# Patient Record
Sex: MALE | Race: WHITE | Employment: OTHER | URBAN - METROPOLITAN AREA
[De-identification: names, ages, dates, MRNs, and addresses within clinical notes are randomized per-mention and may not be internally consistent; named-entity substitution may affect disease eponyms.]

---

## 2022-11-12 ENCOUNTER — HOSPITAL ENCOUNTER (INPATIENT)
Age: 72
LOS: 5 days | Discharge: HOME | DRG: 247 | End: 2022-11-18
Attending: EMERGENCY MEDICINE | Admitting: INTERNAL MEDICINE
Payer: MEDICARE

## 2022-11-12 ENCOUNTER — APPOINTMENT (OUTPATIENT)
Dept: GENERAL RADIOLOGY | Age: 72
DRG: 247 | End: 2022-11-12
Payer: MEDICARE

## 2022-11-12 DIAGNOSIS — R04.0 EPISTAXIS: Primary | ICD-10-CM

## 2022-11-12 DIAGNOSIS — I21.3 ST ELEVATION MYOCARDIAL INFARCTION (STEMI), UNSPECIFIED ARTERY (HCC): ICD-10-CM

## 2022-11-12 DIAGNOSIS — I21.3 STEMI (ST ELEVATION MYOCARDIAL INFARCTION) (HCC): ICD-10-CM

## 2022-11-12 LAB
ALBUMIN SERPL-MCNC: 3.1 G/DL (ref 3.2–4.6)
ALBUMIN/GLOB SERPL: 0.9 {RATIO} (ref 0.4–1.6)
ALP SERPL-CCNC: 90 U/L (ref 50–136)
ALT SERPL-CCNC: 23 U/L (ref 12–65)
ANION GAP SERPL CALC-SCNC: 5 MMOL/L (ref 2–11)
AST SERPL-CCNC: 12 U/L (ref 15–37)
BASOPHILS # BLD: 0.1 K/UL (ref 0–0.2)
BASOPHILS NFR BLD: 1 % (ref 0–2)
BILIRUB SERPL-MCNC: 0.3 MG/DL (ref 0.2–1.1)
BUN SERPL-MCNC: 17 MG/DL (ref 8–23)
CALCIUM SERPL-MCNC: 9.2 MG/DL (ref 8.3–10.4)
CHLORIDE SERPL-SCNC: 111 MMOL/L (ref 101–110)
CO2 SERPL-SCNC: 25 MMOL/L (ref 21–32)
CREAT SERPL-MCNC: 1.3 MG/DL (ref 0.8–1.5)
DIFFERENTIAL METHOD BLD: ABNORMAL
EOSINOPHIL # BLD: 0.3 K/UL (ref 0–0.8)
EOSINOPHIL NFR BLD: 2 % (ref 0.5–7.8)
ERYTHROCYTE [DISTWIDTH] IN BLOOD BY AUTOMATED COUNT: 13.1 % (ref 11.9–14.6)
GLOBULIN SER CALC-MCNC: 3.6 G/DL (ref 2.8–4.5)
GLUCOSE SERPL-MCNC: 124 MG/DL (ref 65–100)
HCT VFR BLD AUTO: 44.3 % (ref 41.1–50.3)
HGB BLD-MCNC: 14.9 G/DL (ref 13.6–17.2)
IMM GRANULOCYTES # BLD AUTO: 0.1 K/UL (ref 0–0.5)
IMM GRANULOCYTES NFR BLD AUTO: 1 % (ref 0–5)
LYMPHOCYTES # BLD: 2.7 K/UL (ref 0.5–4.6)
LYMPHOCYTES NFR BLD: 19 % (ref 13–44)
MCH RBC QN AUTO: 31.7 PG (ref 26.1–32.9)
MCHC RBC AUTO-ENTMCNC: 33.6 G/DL (ref 31.4–35)
MCV RBC AUTO: 94.3 FL (ref 82–102)
MONOCYTES # BLD: 1.2 K/UL (ref 0.1–1.3)
MONOCYTES NFR BLD: 8 % (ref 4–12)
NEUTS SEG # BLD: 9.8 K/UL (ref 1.7–8.2)
NEUTS SEG NFR BLD: 69 % (ref 43–78)
NRBC # BLD: 0 K/UL (ref 0–0.2)
PLATELET # BLD AUTO: 395 K/UL (ref 150–450)
PMV BLD AUTO: 10 FL (ref 9.4–12.3)
POTASSIUM SERPL-SCNC: 3.8 MMOL/L (ref 3.5–5.1)
PROT SERPL-MCNC: 6.7 G/DL (ref 6.3–8.2)
RBC # BLD AUTO: 4.7 M/UL (ref 4.23–5.6)
SODIUM SERPL-SCNC: 141 MMOL/L (ref 133–143)
WBC # BLD AUTO: 14.1 K/UL (ref 4.3–11.1)

## 2022-11-12 PROCEDURE — 2580000003 HC RX 258: Performed by: EMERGENCY MEDICINE

## 2022-11-12 PROCEDURE — 6370000000 HC RX 637 (ALT 250 FOR IP): Performed by: PHYSICIAN ASSISTANT

## 2022-11-12 PROCEDURE — 027034Z DILATION OF CORONARY ARTERY, ONE ARTERY WITH DRUG-ELUTING INTRALUMINAL DEVICE, PERCUTANEOUS APPROACH: ICD-10-PCS | Performed by: INTERNAL MEDICINE

## 2022-11-12 PROCEDURE — 30903 CONTROL OF NOSEBLEED: CPT

## 2022-11-12 PROCEDURE — 99285 EMERGENCY DEPT VISIT HI MDM: CPT

## 2022-11-12 PROCEDURE — 85025 COMPLETE CBC W/AUTO DIFF WBC: CPT

## 2022-11-12 PROCEDURE — 4A023N7 MEASUREMENT OF CARDIAC SAMPLING AND PRESSURE, LEFT HEART, PERCUTANEOUS APPROACH: ICD-10-PCS | Performed by: INTERNAL MEDICINE

## 2022-11-12 PROCEDURE — 71045 X-RAY EXAM CHEST 1 VIEW: CPT

## 2022-11-12 PROCEDURE — 93005 ELECTROCARDIOGRAM TRACING: CPT | Performed by: EMERGENCY MEDICINE

## 2022-11-12 PROCEDURE — 85610 PROTHROMBIN TIME: CPT

## 2022-11-12 PROCEDURE — 96374 THER/PROPH/DIAG INJ IV PUSH: CPT

## 2022-11-12 PROCEDURE — 6360000002 HC RX W HCPCS

## 2022-11-12 PROCEDURE — 96375 TX/PRO/DX INJ NEW DRUG ADDON: CPT

## 2022-11-12 PROCEDURE — 6370000000 HC RX 637 (ALT 250 FOR IP): Performed by: EMERGENCY MEDICINE

## 2022-11-12 PROCEDURE — 85730 THROMBOPLASTIN TIME PARTIAL: CPT

## 2022-11-12 PROCEDURE — B2111ZZ FLUOROSCOPY OF MULTIPLE CORONARY ARTERIES USING LOW OSMOLAR CONTRAST: ICD-10-PCS | Performed by: INTERNAL MEDICINE

## 2022-11-12 PROCEDURE — 84484 ASSAY OF TROPONIN QUANT: CPT

## 2022-11-12 PROCEDURE — 80053 COMPREHEN METABOLIC PANEL: CPT

## 2022-11-12 PROCEDURE — 6360000002 HC RX W HCPCS: Performed by: EMERGENCY MEDICINE

## 2022-11-12 DEVICE — STENT RONYX30034UX RESOLUTE ONYX 3.00X34
Type: IMPLANTABLE DEVICE | Status: FUNCTIONAL
Brand: RESOLUTE ONYX™

## 2022-11-12 RX ORDER — HEPARIN SODIUM 1000 [USP'U]/ML
4000 INJECTION, SOLUTION INTRAVENOUS; SUBCUTANEOUS ONCE
Status: COMPLETED | OUTPATIENT
Start: 2022-11-12 | End: 2022-11-12

## 2022-11-12 RX ORDER — ASPIRIN 81 MG/1
324 TABLET, CHEWABLE ORAL
Status: COMPLETED | OUTPATIENT
Start: 2022-11-12 | End: 2022-11-12

## 2022-11-12 RX ORDER — 0.9 % SODIUM CHLORIDE 0.9 %
1000 INTRAVENOUS SOLUTION INTRAVENOUS
Status: COMPLETED | OUTPATIENT
Start: 2022-11-12 | End: 2022-11-13

## 2022-11-12 RX ORDER — HEPARIN SODIUM 1000 [USP'U]/ML
4000 INJECTION, SOLUTION INTRAVENOUS; SUBCUTANEOUS PRN
Status: DISCONTINUED | OUTPATIENT
Start: 2022-11-12 | End: 2022-11-13

## 2022-11-12 RX ORDER — OXYMETAZOLINE HYDROCHLORIDE 0.05 G/100ML
2 SPRAY NASAL ONCE
Status: COMPLETED | OUTPATIENT
Start: 2022-11-12 | End: 2022-11-12

## 2022-11-12 RX ORDER — ONDANSETRON 2 MG/ML
INJECTION INTRAMUSCULAR; INTRAVENOUS
Status: COMPLETED
Start: 2022-11-12 | End: 2022-11-12

## 2022-11-12 RX ORDER — ONDANSETRON 2 MG/ML
4 INJECTION INTRAMUSCULAR; INTRAVENOUS
Status: COMPLETED | OUTPATIENT
Start: 2022-11-13 | End: 2022-11-12

## 2022-11-12 RX ORDER — HEPARIN SODIUM 10000 [USP'U]/100ML
5-30 INJECTION, SOLUTION INTRAVENOUS CONTINUOUS
Status: DISCONTINUED | OUTPATIENT
Start: 2022-11-12 | End: 2022-11-13

## 2022-11-12 RX ORDER — HEPARIN SODIUM 1000 [USP'U]/ML
2000 INJECTION, SOLUTION INTRAVENOUS; SUBCUTANEOUS PRN
Status: DISCONTINUED | OUTPATIENT
Start: 2022-11-12 | End: 2022-11-13

## 2022-11-12 RX ORDER — NITROGLYCERIN 0.4 MG/1
0.4 TABLET SUBLINGUAL EVERY 5 MIN PRN
Status: DISCONTINUED | OUTPATIENT
Start: 2022-11-12 | End: 2022-11-13

## 2022-11-12 RX ADMIN — OXYMETAZOLINE HCL 2 SPRAY: 0.05 SPRAY NASAL at 21:38

## 2022-11-12 RX ADMIN — SODIUM CHLORIDE 1000 ML: 900 INJECTION, SOLUTION INTRAVENOUS at 23:23

## 2022-11-12 RX ADMIN — ONDANSETRON 4 MG: 2 INJECTION INTRAMUSCULAR; INTRAVENOUS at 23:47

## 2022-11-12 RX ADMIN — NITROGLYCERIN 1 INCH: 20 OINTMENT TOPICAL at 23:35

## 2022-11-12 RX ADMIN — ASPIRIN 324 MG: 81 TABLET, CHEWABLE ORAL at 23:31

## 2022-11-12 RX ADMIN — NITROGLYCERIN 0.4 MG: 0.4 TABLET SUBLINGUAL at 23:31

## 2022-11-12 RX ADMIN — HEPARIN SODIUM 4000 UNITS: 1000 INJECTION INTRAVENOUS; SUBCUTANEOUS at 23:42

## 2022-11-12 ASSESSMENT — PAIN DESCRIPTION - LOCATION: LOCATION: CHEST

## 2022-11-12 ASSESSMENT — ENCOUNTER SYMPTOMS
COUGH: 0
BACK PAIN: 0
NAUSEA: 0
SHORTNESS OF BREATH: 1
COLOR CHANGE: 0
RHINORRHEA: 0
CONSTIPATION: 0
ABDOMINAL PAIN: 0
SORE THROAT: 0
VOMITING: 0
DIARRHEA: 0

## 2022-11-12 ASSESSMENT — PAIN SCALES - GENERAL
PAINLEVEL_OUTOF10: 7
PAINLEVEL_OUTOF10: 6

## 2022-11-12 ASSESSMENT — PAIN - FUNCTIONAL ASSESSMENT: PAIN_FUNCTIONAL_ASSESSMENT: NONE - DENIES PAIN

## 2022-11-12 NOTE — Clinical Note
TRANSFER - OUT REPORT:     Verbal report given to: ICU RN. Report consisted of patient's Situation, Background, Assessment and   Recommendations(SBAR). Opportunity for questions and clarification was provided. Patient transported with a Registered Nurse. Patient transported to: ICU, 3101.

## 2022-11-13 ENCOUNTER — APPOINTMENT (OUTPATIENT)
Dept: NON INVASIVE DIAGNOSTICS | Age: 72
DRG: 247 | End: 2022-11-13
Payer: MEDICARE

## 2022-11-13 ENCOUNTER — APPOINTMENT (OUTPATIENT)
Dept: GENERAL RADIOLOGY | Age: 72
DRG: 247 | End: 2022-11-13
Payer: MEDICARE

## 2022-11-13 PROBLEM — I24.9 ACS (ACUTE CORONARY SYNDROME) (HCC): Status: ACTIVE | Noted: 2022-11-13

## 2022-11-13 PROBLEM — F12.10 MARIJUANA ABUSE: Chronic | Status: ACTIVE | Noted: 2022-11-13

## 2022-11-13 PROBLEM — Z72.0 TOBACCO ABUSE: Chronic | Status: ACTIVE | Noted: 2022-11-13

## 2022-11-13 PROBLEM — I21.02 ST ELEVATION MYOCARDIAL INFARCTION INVOLVING LEFT ANTERIOR DESCENDING (LAD) CORONARY ARTERY (HCC): Chronic | Status: ACTIVE | Noted: 2022-11-13

## 2022-11-13 PROBLEM — R04.0 EPISTAXIS: Status: ACTIVE | Noted: 2022-11-13

## 2022-11-13 LAB
ACT BLD: 404 SECS (ref 70–128)
ANION GAP SERPL CALC-SCNC: 3 MMOL/L (ref 2–11)
APTT PPP: 37 SEC (ref 24.5–34.2)
BUN SERPL-MCNC: 23 MG/DL (ref 8–23)
CALCIUM SERPL-MCNC: 8.5 MG/DL (ref 8.3–10.4)
CHLORIDE SERPL-SCNC: 111 MMOL/L (ref 101–110)
CHOLEST SERPL-MCNC: 146 MG/DL
CO2 SERPL-SCNC: 24 MMOL/L (ref 21–32)
CREAT SERPL-MCNC: 1.1 MG/DL (ref 0.8–1.5)
ECHO AO ASC DIAM: 3 CM
ECHO AO ASCENDING AORTA INDEX: 1.55 CM/M2
ECHO AO ROOT DIAM: 2.7 CM
ECHO AO ROOT INDEX: 1.4 CM/M2
ECHO AV MEAN GRADIENT: 5 MMHG
ECHO AV MEAN VELOCITY: 1 M/S
ECHO AV PEAK GRADIENT: 9 MMHG
ECHO AV PEAK VELOCITY: 1.5 M/S
ECHO AV VTI: 25.5 CM
ECHO BSA: 1.98 M2
ECHO BSA: 1.98 M2
ECHO EST RA PRESSURE: 3 MMHG
ECHO IVC PROX: 1.8 CM
ECHO LA AREA 2C: 19.1 CM2
ECHO LA AREA 4C: 15.2 CM2
ECHO LA DIAMETER INDEX: 1.87 CM/M2
ECHO LA DIAMETER: 3.6 CM
ECHO LA MAJOR AXIS: 5 CM
ECHO LA MINOR AXIS: 5.3 CM
ECHO LA TO AORTIC ROOT RATIO: 1.33
ECHO LA VOL 2C: 56 ML (ref 18–58)
ECHO LA VOL 4C: 35 ML (ref 18–58)
ECHO LA VOL BP: 45 ML (ref 18–58)
ECHO LA VOL/BSA BIPLANE: 23 ML/M2 (ref 16–34)
ECHO LA VOLUME INDEX A2C: 29 ML/M2 (ref 16–34)
ECHO LA VOLUME INDEX A4C: 18 ML/M2 (ref 16–34)
ECHO LV E' LATERAL VELOCITY: 7 CM/S
ECHO LV E' SEPTAL VELOCITY: 6 CM/S
ECHO LV EDV A2C: 138 ML
ECHO LV EDV A4C: 142 ML
ECHO LV EDV INDEX A4C: 74 ML/M2
ECHO LV EDV NDEX A2C: 72 ML/M2
ECHO LV EJECTION FRACTION A2C: 42 %
ECHO LV EJECTION FRACTION A4C: 40 %
ECHO LV EJECTION FRACTION BIPLANE: 42 % (ref 55–100)
ECHO LV ESV A2C: 79 ML
ECHO LV ESV A4C: 86 ML
ECHO LV ESV INDEX A2C: 41 ML/M2
ECHO LV ESV INDEX A4C: 45 ML/M2
ECHO LV FRACTIONAL SHORTENING: 20 % (ref 28–44)
ECHO LV INTERNAL DIMENSION DIASTOLE INDEX: 2.59 CM/M2
ECHO LV INTERNAL DIMENSION DIASTOLIC: 5 CM (ref 4.2–5.9)
ECHO LV INTERNAL DIMENSION SYSTOLIC INDEX: 2.07 CM/M2
ECHO LV INTERNAL DIMENSION SYSTOLIC: 4 CM
ECHO LV IVSD: 1.2 CM (ref 0.6–1)
ECHO LV MASS 2D: 233.7 G (ref 88–224)
ECHO LV MASS INDEX 2D: 121.1 G/M2 (ref 49–115)
ECHO LV POSTERIOR WALL DIASTOLIC: 1.2 CM (ref 0.6–1)
ECHO LV RELATIVE WALL THICKNESS RATIO: 0.48
ECHO LVOT AREA: 3.5 CM2
ECHO LVOT DIAM: 2.1 CM
ECHO MV A VELOCITY: 1.23 M/S
ECHO MV E DECELERATION TIME (DT): 205 MS
ECHO MV E VELOCITY: 1.1 M/S
ECHO MV E/A RATIO: 0.89
ECHO MV E/E' LATERAL: 15.71
ECHO MV E/E' RATIO (AVERAGED): 17.02
ECHO MV E/E' SEPTAL: 18.33
ECHO PV ACCELERATION TIME (AT): 108 MS
ECHO PV MAX VELOCITY: 1.2 M/S
ECHO PV PEAK GRADIENT: 6 MMHG
ECHO RV BASAL DIMENSION: 2.7 CM
ECHO RV FREE WALL PEAK S': 15 CM/S
ECHO RV TAPSE: 1.6 CM (ref 1.7–?)
EKG ATRIAL RATE: 116 BPM
EKG ATRIAL RATE: 73 BPM
EKG DIAGNOSIS: NORMAL
EKG DIAGNOSIS: NORMAL
EKG P AXIS: 49 DEGREES
EKG P AXIS: 79 DEGREES
EKG P-R INTERVAL: 142 MS
EKG P-R INTERVAL: 143 MS
EKG Q-T INTERVAL: 311 MS
EKG Q-T INTERVAL: 418 MS
EKG QRS DURATION: 83 MS
EKG QRS DURATION: 86 MS
EKG QTC CALCULATION (BAZETT): 432 MS
EKG QTC CALCULATION (BAZETT): 460 MS
EKG R AXIS: 19 DEGREES
EKG R AXIS: 86 DEGREES
EKG T AXIS: -50 DEGREES
EKG T AXIS: 97 DEGREES
EKG VENTRICULAR RATE: 116 BPM
EKG VENTRICULAR RATE: 73 BPM
ERYTHROCYTE [DISTWIDTH] IN BLOOD BY AUTOMATED COUNT: 13.2 % (ref 11.9–14.6)
EST. AVERAGE GLUCOSE BLD GHB EST-MCNC: 120 MG/DL
GLUCOSE SERPL-MCNC: 153 MG/DL (ref 65–100)
HBA1C MFR BLD: 5.8 % (ref 4.8–5.6)
HCT VFR BLD AUTO: 37.5 % (ref 41.1–50.3)
HDLC SERPL-MCNC: 27 MG/DL (ref 40–60)
HDLC SERPL: 5.4 {RATIO}
HGB BLD-MCNC: 12.3 G/DL (ref 13.6–17.2)
INR PPP: 1
LDLC SERPL CALC-MCNC: 87 MG/DL
LV EF: 42 %
LVEF MODALITY: ABNORMAL
MAGNESIUM SERPL-MCNC: 2.4 MG/DL (ref 1.8–2.4)
MCH RBC QN AUTO: 31.5 PG (ref 26.1–32.9)
MCHC RBC AUTO-ENTMCNC: 32.8 G/DL (ref 31.4–35)
MCV RBC AUTO: 95.9 FL (ref 82–102)
NRBC # BLD: 0 K/UL (ref 0–0.2)
PLATELET # BLD AUTO: 350 K/UL (ref 150–450)
PMV BLD AUTO: 9.9 FL (ref 9.4–12.3)
POTASSIUM SERPL-SCNC: 4.2 MMOL/L (ref 3.5–5.1)
PROTHROMBIN TIME: 13.1 SEC (ref 12.6–14.3)
RBC # BLD AUTO: 3.91 M/UL (ref 4.23–5.6)
SODIUM SERPL-SCNC: 138 MMOL/L (ref 133–143)
TRIGL SERPL-MCNC: 160 MG/DL (ref 35–150)
TROPONIN I SERPL HS-MCNC: 593.9 PG/ML (ref 0–14)
TROPONIN I SERPL HS-MCNC: ABNORMAL PG/ML (ref 0–14)
VLDLC SERPL CALC-MCNC: 32 MG/DL (ref 6–23)
WBC # BLD AUTO: 16.1 K/UL (ref 4.3–11.1)

## 2022-11-13 PROCEDURE — 6360000004 HC RX CONTRAST MEDICATION: Performed by: INTERNAL MEDICINE

## 2022-11-13 PROCEDURE — 99152 MOD SED SAME PHYS/QHP 5/>YRS: CPT | Performed by: INTERNAL MEDICINE

## 2022-11-13 PROCEDURE — 99223 1ST HOSP IP/OBS HIGH 75: CPT | Performed by: INTERNAL MEDICINE

## 2022-11-13 PROCEDURE — 6370000000 HC RX 637 (ALT 250 FOR IP): Performed by: INTERNAL MEDICINE

## 2022-11-13 PROCEDURE — 1100000003 HC PRIVATE W/ TELEMETRY

## 2022-11-13 PROCEDURE — 93306 TTE W/DOPPLER COMPLETE: CPT

## 2022-11-13 PROCEDURE — 85027 COMPLETE CBC AUTOMATED: CPT

## 2022-11-13 PROCEDURE — 92941 PRQ TRLML REVSC TOT OCCL AMI: CPT | Performed by: INTERNAL MEDICINE

## 2022-11-13 PROCEDURE — 83036 HEMOGLOBIN GLYCOSYLATED A1C: CPT

## 2022-11-13 PROCEDURE — 2500000003 HC RX 250 WO HCPCS: Performed by: NURSE PRACTITIONER

## 2022-11-13 PROCEDURE — C9606 PERC D-E COR REVASC W AMI S: HCPCS | Performed by: INTERNAL MEDICINE

## 2022-11-13 PROCEDURE — 87205 SMEAR GRAM STAIN: CPT

## 2022-11-13 PROCEDURE — 6370000000 HC RX 637 (ALT 250 FOR IP): Performed by: NURSE PRACTITIONER

## 2022-11-13 PROCEDURE — 2709999900 HC NON-CHARGEABLE SUPPLY: Performed by: INTERNAL MEDICINE

## 2022-11-13 PROCEDURE — 71045 X-RAY EXAM CHEST 1 VIEW: CPT

## 2022-11-13 PROCEDURE — 2580000003 HC RX 258: Performed by: NURSE PRACTITIONER

## 2022-11-13 PROCEDURE — 83735 ASSAY OF MAGNESIUM: CPT

## 2022-11-13 PROCEDURE — 93458 L HRT ARTERY/VENTRICLE ANGIO: CPT | Performed by: INTERNAL MEDICINE

## 2022-11-13 PROCEDURE — C1887 CATHETER, GUIDING: HCPCS | Performed by: INTERNAL MEDICINE

## 2022-11-13 PROCEDURE — 84484 ASSAY OF TROPONIN QUANT: CPT

## 2022-11-13 PROCEDURE — 36415 COLL VENOUS BLD VENIPUNCTURE: CPT

## 2022-11-13 PROCEDURE — 80061 LIPID PANEL: CPT

## 2022-11-13 PROCEDURE — 87077 CULTURE AEROBIC IDENTIFY: CPT

## 2022-11-13 PROCEDURE — 94760 N-INVAS EAR/PLS OXIMETRY 1: CPT

## 2022-11-13 PROCEDURE — C1894 INTRO/SHEATH, NON-LASER: HCPCS | Performed by: INTERNAL MEDICINE

## 2022-11-13 PROCEDURE — 2500000003 HC RX 250 WO HCPCS: Performed by: INTERNAL MEDICINE

## 2022-11-13 PROCEDURE — 2700000000 HC OXYGEN THERAPY PER DAY

## 2022-11-13 PROCEDURE — 93005 ELECTROCARDIOGRAM TRACING: CPT | Performed by: NURSE PRACTITIONER

## 2022-11-13 PROCEDURE — 6360000002 HC RX W HCPCS: Performed by: NURSE PRACTITIONER

## 2022-11-13 PROCEDURE — 85347 COAGULATION TIME ACTIVATED: CPT

## 2022-11-13 PROCEDURE — 80048 BASIC METABOLIC PNL TOTAL CA: CPT

## 2022-11-13 PROCEDURE — 6360000002 HC RX W HCPCS: Performed by: INTERNAL MEDICINE

## 2022-11-13 PROCEDURE — C1874 STENT, COATED/COV W/DEL SYS: HCPCS | Performed by: INTERNAL MEDICINE

## 2022-11-13 PROCEDURE — C1769 GUIDE WIRE: HCPCS | Performed by: INTERNAL MEDICINE

## 2022-11-13 PROCEDURE — C1725 CATH, TRANSLUMIN NON-LASER: HCPCS | Performed by: INTERNAL MEDICINE

## 2022-11-13 PROCEDURE — 87186 SC STD MICRODIL/AGAR DIL: CPT

## 2022-11-13 PROCEDURE — 93306 TTE W/DOPPLER COMPLETE: CPT | Performed by: INTERNAL MEDICINE

## 2022-11-13 RX ORDER — HEPARIN SODIUM 200 [USP'U]/100ML
INJECTION, SOLUTION INTRAVENOUS CONTINUOUS PRN
Status: DISCONTINUED | OUTPATIENT
Start: 2022-11-13 | End: 2022-11-13 | Stop reason: HOSPADM

## 2022-11-13 RX ORDER — NICOTINE 21 MG/24HR
1 PATCH, TRANSDERMAL 24 HOURS TRANSDERMAL DAILY
Status: DISCONTINUED | OUTPATIENT
Start: 2022-11-13 | End: 2022-11-18 | Stop reason: HOSPADM

## 2022-11-13 RX ORDER — ONDANSETRON 4 MG/1
4 TABLET, ORALLY DISINTEGRATING ORAL EVERY 8 HOURS PRN
Status: DISCONTINUED | OUTPATIENT
Start: 2022-11-13 | End: 2022-11-18 | Stop reason: HOSPADM

## 2022-11-13 RX ORDER — ATORVASTATIN CALCIUM 80 MG/1
80 TABLET, FILM COATED ORAL NIGHTLY
Status: DISCONTINUED | OUTPATIENT
Start: 2022-11-13 | End: 2022-11-18 | Stop reason: HOSPADM

## 2022-11-13 RX ORDER — ACETAMINOPHEN 325 MG/1
650 TABLET ORAL EVERY 6 HOURS PRN
Status: DISCONTINUED | OUTPATIENT
Start: 2022-11-13 | End: 2022-11-18 | Stop reason: HOSPADM

## 2022-11-13 RX ORDER — CLOPIDOGREL BISULFATE 75 MG/1
TABLET ORAL PRN
Status: DISCONTINUED | OUTPATIENT
Start: 2022-11-13 | End: 2022-11-13 | Stop reason: HOSPADM

## 2022-11-13 RX ORDER — NITROGLYCERIN 0.4 MG/1
0.4 TABLET SUBLINGUAL EVERY 5 MIN PRN
Status: DISCONTINUED | OUTPATIENT
Start: 2022-11-13 | End: 2022-11-18 | Stop reason: HOSPADM

## 2022-11-13 RX ORDER — ACETAMINOPHEN 650 MG/1
650 SUPPOSITORY RECTAL EVERY 6 HOURS PRN
Status: DISCONTINUED | OUTPATIENT
Start: 2022-11-13 | End: 2022-11-18 | Stop reason: HOSPADM

## 2022-11-13 RX ORDER — SODIUM HYPOCHLORITE 1.25 MG/ML
SOLUTION TOPICAL 2 TIMES DAILY
Status: DISCONTINUED | OUTPATIENT
Start: 2022-11-13 | End: 2022-11-18 | Stop reason: HOSPADM

## 2022-11-13 RX ORDER — SODIUM CHLORIDE 0.9 % (FLUSH) 0.9 %
5-40 SYRINGE (ML) INJECTION EVERY 12 HOURS SCHEDULED
Status: DISCONTINUED | OUTPATIENT
Start: 2022-11-13 | End: 2022-11-18 | Stop reason: HOSPADM

## 2022-11-13 RX ORDER — METOPROLOL SUCCINATE 25 MG/1
25 TABLET, EXTENDED RELEASE ORAL DAILY
Status: DISCONTINUED | OUTPATIENT
Start: 2022-11-13 | End: 2022-11-17

## 2022-11-13 RX ORDER — CLOPIDOGREL BISULFATE 75 MG/1
75 TABLET ORAL DAILY
Status: DISCONTINUED | OUTPATIENT
Start: 2022-11-14 | End: 2022-11-18 | Stop reason: HOSPADM

## 2022-11-13 RX ORDER — MAGNESIUM SULFATE IN WATER 40 MG/ML
2000 INJECTION, SOLUTION INTRAVENOUS PRN
Status: DISCONTINUED | OUTPATIENT
Start: 2022-11-13 | End: 2022-11-18 | Stop reason: HOSPADM

## 2022-11-13 RX ORDER — POTASSIUM CHLORIDE 20 MEQ/1
40 TABLET, EXTENDED RELEASE ORAL PRN
Status: DISCONTINUED | OUTPATIENT
Start: 2022-11-13 | End: 2022-11-18 | Stop reason: HOSPADM

## 2022-11-13 RX ORDER — EPTIFIBATIDE 0.75 MG/ML
INJECTION, SOLUTION INTRAVENOUS CONTINUOUS PRN
Status: COMPLETED | OUTPATIENT
Start: 2022-11-13 | End: 2022-11-13

## 2022-11-13 RX ORDER — SODIUM CHLORIDE 9 MG/ML
INJECTION, SOLUTION INTRAVENOUS PRN
Status: DISCONTINUED | OUTPATIENT
Start: 2022-11-13 | End: 2022-11-18 | Stop reason: HOSPADM

## 2022-11-13 RX ORDER — POTASSIUM CHLORIDE 7.45 MG/ML
10 INJECTION INTRAVENOUS PRN
Status: DISCONTINUED | OUTPATIENT
Start: 2022-11-13 | End: 2022-11-18 | Stop reason: HOSPADM

## 2022-11-13 RX ORDER — SODIUM CHLORIDE 9 MG/ML
INJECTION, SOLUTION INTRAVENOUS CONTINUOUS
Status: DISCONTINUED | OUTPATIENT
Start: 2022-11-13 | End: 2022-11-13

## 2022-11-13 RX ORDER — METOPROLOL TARTRATE 5 MG/5ML
5 INJECTION INTRAVENOUS ONCE
Status: COMPLETED | OUTPATIENT
Start: 2022-11-13 | End: 2022-11-13

## 2022-11-13 RX ORDER — LIDOCAINE HYDROCHLORIDE 10 MG/ML
INJECTION, SOLUTION INFILTRATION; PERINEURAL PRN
Status: DISCONTINUED | OUTPATIENT
Start: 2022-11-13 | End: 2022-11-13 | Stop reason: HOSPADM

## 2022-11-13 RX ORDER — LOSARTAN POTASSIUM 25 MG/1
25 TABLET ORAL DAILY
Status: DISCONTINUED | OUTPATIENT
Start: 2022-11-13 | End: 2022-11-17

## 2022-11-13 RX ORDER — ASPIRIN 81 MG/1
81 TABLET, CHEWABLE ORAL DAILY
Status: DISCONTINUED | OUTPATIENT
Start: 2022-11-13 | End: 2022-11-18 | Stop reason: HOSPADM

## 2022-11-13 RX ORDER — EPTIFIBATIDE 0.75 MG/ML
2 INJECTION, SOLUTION INTRAVENOUS CONTINUOUS
Status: DISCONTINUED | OUTPATIENT
Start: 2022-11-13 | End: 2022-11-13

## 2022-11-13 RX ORDER — ONDANSETRON 2 MG/ML
4 INJECTION INTRAMUSCULAR; INTRAVENOUS EVERY 6 HOURS PRN
Status: DISCONTINUED | OUTPATIENT
Start: 2022-11-13 | End: 2022-11-18 | Stop reason: HOSPADM

## 2022-11-13 RX ORDER — MAGNESIUM HYDROXIDE/ALUMINUM HYDROXICE/SIMETHICONE 120; 1200; 1200 MG/30ML; MG/30ML; MG/30ML
30 SUSPENSION ORAL EVERY 6 HOURS PRN
Status: DISCONTINUED | OUTPATIENT
Start: 2022-11-13 | End: 2022-11-18 | Stop reason: HOSPADM

## 2022-11-13 RX ORDER — POLYETHYLENE GLYCOL 3350 17 G/17G
17 POWDER, FOR SOLUTION ORAL DAILY PRN
Status: DISCONTINUED | OUTPATIENT
Start: 2022-11-13 | End: 2022-11-18 | Stop reason: HOSPADM

## 2022-11-13 RX ORDER — MAGNESIUM HYDROXIDE/ALUMINUM HYDROXICE/SIMETHICONE 120; 1200; 1200 MG/30ML; MG/30ML; MG/30ML
SUSPENSION ORAL PRN
Status: DISCONTINUED | OUTPATIENT
Start: 2022-11-13 | End: 2022-11-13 | Stop reason: HOSPADM

## 2022-11-13 RX ORDER — SODIUM CHLORIDE 0.9 % (FLUSH) 0.9 %
5-40 SYRINGE (ML) INJECTION PRN
Status: DISCONTINUED | OUTPATIENT
Start: 2022-11-13 | End: 2022-11-18 | Stop reason: HOSPADM

## 2022-11-13 RX ORDER — MIDAZOLAM HYDROCHLORIDE 1 MG/ML
INJECTION INTRAMUSCULAR; INTRAVENOUS PRN
Status: DISCONTINUED | OUTPATIENT
Start: 2022-11-13 | End: 2022-11-13 | Stop reason: HOSPADM

## 2022-11-13 RX ADMIN — LOSARTAN POTASSIUM 25 MG: 25 TABLET, FILM COATED ORAL at 08:54

## 2022-11-13 RX ADMIN — SODIUM CHLORIDE: 9 INJECTION, SOLUTION INTRAVENOUS at 01:53

## 2022-11-13 RX ADMIN — EPTIFIBATIDE 2 MCG/KG/MIN: 0.75 INJECTION INTRAVENOUS at 02:01

## 2022-11-13 RX ADMIN — Medication: at 20:13

## 2022-11-13 RX ADMIN — CEFTRIAXONE 1000 MG: 1 INJECTION, POWDER, FOR SOLUTION INTRAMUSCULAR; INTRAVENOUS at 01:57

## 2022-11-13 RX ADMIN — ATORVASTATIN CALCIUM 80 MG: 80 TABLET, FILM COATED ORAL at 20:11

## 2022-11-13 RX ADMIN — SODIUM CHLORIDE, PRESERVATIVE FREE 10 ML: 5 INJECTION INTRAVENOUS at 09:00

## 2022-11-13 RX ADMIN — METOPROLOL SUCCINATE 25 MG: 25 TABLET, FILM COATED, EXTENDED RELEASE ORAL at 08:54

## 2022-11-13 RX ADMIN — SODIUM CHLORIDE, PRESERVATIVE FREE 10 ML: 5 INJECTION INTRAVENOUS at 20:16

## 2022-11-13 RX ADMIN — ASPIRIN 81 MG 81 MG: 81 TABLET ORAL at 08:54

## 2022-11-13 RX ADMIN — Medication: at 02:24

## 2022-11-13 RX ADMIN — METOPROLOL TARTRATE 5 MG: 5 INJECTION INTRAVENOUS at 01:16

## 2022-11-13 RX ADMIN — Medication: at 09:00

## 2022-11-13 ASSESSMENT — PAIN DESCRIPTION - LOCATION
LOCATION: CHEST
LOCATION: CHEST

## 2022-11-13 ASSESSMENT — PAIN SCALES - GENERAL
PAINLEVEL_OUTOF10: 2
PAINLEVEL_OUTOF10: 6
PAINLEVEL_OUTOF10: 0

## 2022-11-13 NOTE — PROGRESS NOTES
RRT Clinical Rounding Nurse Progress Report      SUBJECTIVE: Called to assess patient secondary to transfer from critical care. Vitals:    11/13/22 1100 11/13/22 1200 11/13/22 1300 11/13/22 1400   BP: 112/67 109/60 114/64 115/67   Pulse: 72 70 67 76   Resp: (!) 37 23 14 (!) 33   Temp: 97.9 °F (36.6 °C)      TempSrc: Oral      SpO2: 95% 95% 96% 94%   Weight:       Height:            DETERIORATION INDEX SCORE: 39      ASSESSMENT:  Patient is alert and oriented. Denies chest pain. No additional pain or SOB. Respirations even and unlabored on RA. Bilateral lung sounds clear. Patient denies any needs at this time. VS, labs, and progress notes reviewed. PLAN:  Will follow per RRT Clinical Rounding Program protocol.     Rina Prader, RN  [address] [facility]: [facility]: [phone]

## 2022-11-13 NOTE — H&P
Patient seen and examined by me. Agree with above note by physician extender. Key findings are: 28-year-old gentleman admitted with acute epistaxis. He developed substernal chest pain. EKG demonstrated anterior ST elevation myocardial infarction. STEMI protocol was activated. Given extensive bilateral nasal bleeding bilateral nares were packed with Rhino Rocket's. Patient described moderate throat fullness but no overt chest pain at time of exam.  Hemodynamics were stable. Patient essentially has no prior medical history as he is sought no health care over his lifetime. He reports a family history of CAD in father. He is a heavy tobacco user and occasional marijuana and alcohol user. He is currently struggling with a large open wound on his chest.    Vitals:    11/12/22 2330 11/12/22 2335 11/12/22 2340 11/12/22 2345   BP: (!) 175/124 (!) 174/112 (!) 165/108 (!) 154/103   Pulse: (!) 113 (!) 105 (!) 104 (!) 106   Resp: 18 21 22 22   Temp:       TempSrc:       SpO2: 100% 98% 97% 98%   Weight:       Height:            General: Patient is alert and oriented, no apparent distress  HEENT: Pupils equal reactive, oropharynx clear  Chest: Clear to auscultation bilaterally  Cardiovascular: S1-S2 regular with no murmur  Abdomen: Soft positive bowel sounds  Extremities: Soft no edema with intact distal pulses    Principal Problem:    ACS (acute coronary syndrome) (HCC)  Plan: Patient is admitted with acute ST elevation myocardial infarction. EKG suggest anterior location. Patient will be brought to the Cath Lab emergently. Hemodynamics are currently stable. Risk benefits procedure discussed the patient is willing to proceed. Further medical recommendations will be made following findings of cardiac catheterization and clinical course.   Active Problems:    Tobacco abuse  Plan: Cessation education    Marijuana abuse  Plan: Cessation education          Jim Del Cid MD  [facility] CARDIOLOGY   History & Physical                 Primary Cardiologist: None    Primary Care Physician: None    Admitting Physician: Dr. Jeane Mendoza:     Patient is a 74 y.o.  male with PMHx of Tobacco abuse, Marijuana abuse, and a non-healing chest wound (2/2 foreign body removal and wound never healed) who presented to the ED with acute epistaxis. States around 1930 he developed an acute onset nose bleed. States there was a large amount of blood. While he was holding his nose he states blood was going down his throat and he was swallowing \"a lot\" of blood. States does not take any medications. No blood thinners. States does not use Goody's powders or NSAIDs. States has smoked approx 1ppd since age 4. Drinks occasional ETOH - none today. Reports smokes MJ 2-3 times per week but denies any other illicit drug use. He denies any recent CP, fatigue or SOB. However, states that he went fishing today and while walking up the bank he had to stop because he felt \"winded and tired. \" Was able to then walk to the car and felt fine. States he never goes to the doctor. Reports his father had heart problems. In the ED: Pt was given Afrin for epistaxis and bleeding stopped. However, he then reported that he \"didn't feel well. \" Reported CP and EKG was obtained. This showed SHANNON and STEMI protocol was activated. He was given 324mg ASA, 1 SL NTG, NTG paste, 4000 units IV Heparin and started on IVF. H/H was stable. Bilat Rhino Rockets placed by ER MD in prep for emergent LHC. No past medical history on file. No past surgical history on file. Allergies   Allergen Reactions    Diphenhydramine      Other reaction(s): Joint swelling-Intolerance    Codeine Rash     Social History     Tobacco Use    Smoking status: Not on file    Smokeless tobacco: Not on file   Substance Use Topics    Alcohol use: Not on file      FH: No family history on file.      Review of Systems  General: no recent weight change, no weakness, no fever or chills, no diaphoresis, no change in appetite or ADLs  Skin: no rashes, +chest wound  HEENT: no headache, dizziness, lightheadedness, vision changes, hearing changes, sinus pressure/pain/congestion, +nose bleed  Neck: no swollen glands, goiter, pain or stiffness  Respiratory: no cough, no congestion, no sputum, no hemoptysis, no dyspnea, no wheezing  Cardiovascular: + as per HPI, no palpitations, syncope, orthopnea, PND  Gastrointestinal: no increased reflux, no constipation, diarrhea, liver problems, GI bleeding, no abdominal pain or distension, no N/V  Urinary: no frequency, urgency, hematuria, burning/pain with urination, flank pain or difficulty urinating  Peripheral Vascular: no claudication, leg cramps, prior DVTs, no swelling of BLE, no color change, or swelling with redness or tenderness  Musculoskeletal: no new muscle or joint pain/stiffness, joint swelling, erythema of joints, or back pain  Psychiatric: no increased depression, anxiety or excessive stress  Neurological: no sensory or motor loss, seizures, syncope, tremors, numbness, tingling, no changes in mood, attention, or speech, no changes in orientation, memory, insight, or judgment. Hematologic: no anemia, no easy bruising or bleeding  Endocrine: no thyroid problems, no heat or cold intolerance, excessive sweating, polyuria, polydipsia, no diabetes. Objective:       BP (!) 154/103   Pulse (!) 106   Temp (!) 96 °F (35.6 °C) (Oral)   Resp 22   Ht 5' 9\" (1.753 m)   Wt 177 lb (80.3 kg)   SpO2 98%   BMI 26.14 kg/m²     No intake/output data recorded. No intake/output data recorded.     Physical Exam:  General: well developed, well nourished, NAD, resting comfortably, unhealthy appearing in general  HEENT: PERRLA, no abnormalities noted, sclera clear, EOMs intact, dried blood noted on face and nares  Neck: supple, no JVD, trachea midline   Heart: S1S2 with RRR without obvious murmurs, rubs or gallops  Lungs: clear to auscultation bilaterally, normal effort on O2, no wheezing or rales  Abd: soft, nontender, nondistended, +bowel sounds  Ext: warm, no edema, calves supple/nontender, pulses 2+ bilaterally  Skin: warm and dry, intact to view  Psychiatric: appropriate mood and affect, cooperative  Neurologic: A&O X 3, moves all 4 equally, CNs intact      ECG: SHANNON V2-V4    ECHO: ordered and pending    CXR: ordered and pending    Data Review:      Recent Results (from the past 24 hour(s))   CBC with Auto Differential    Collection Time: 11/12/22  9:48 PM   Result Value Ref Range    WBC 14.1 (H) 4.3 - 11.1 K/uL    RBC 4.70 4.23 - 5.6 M/uL    Hemoglobin 14.9 13.6 - 17.2 g/dL    Hematocrit 44.3 41.1 - 50.3 %    MCV 94.3 82 - 102 FL    MCH 31.7 26.1 - 32.9 PG    MCHC 33.6 31.4 - 35.0 g/dL    RDW 13.1 11.9 - 14.6 %    Platelets 692 924 - 048 K/uL    MPV 10.0 9.4 - 12.3 FL    nRBC 0.00 0.0 - 0.2 K/uL    Differential Type AUTOMATED      Seg Neutrophils 69 43 - 78 %    Lymphocytes 19 13 - 44 %    Monocytes 8 4.0 - 12.0 %    Eosinophils % 2 0.5 - 7.8 %    Basophils 1 0.0 - 2.0 %    Immature Granulocytes 1 0.0 - 5.0 %    Segs Absolute 9.8 (H) 1.7 - 8.2 K/UL    Absolute Lymph # 2.7 0.5 - 4.6 K/UL    Absolute Mono # 1.2 0.1 - 1.3 K/UL    Absolute Eos # 0.3 0.0 - 0.8 K/UL    Basophils Absolute 0.1 0.0 - 0.2 K/UL    Absolute Immature Granulocyte 0.1 0.0 - 0.5 K/UL   Comprehensive Metabolic Panel    Collection Time: 11/12/22  9:48 PM   Result Value Ref Range    Sodium 141 133 - 143 mmol/L    Potassium 3.8 3.5 - 5.1 mmol/L    Chloride 111 (H) 101 - 110 mmol/L    CO2 25 21 - 32 mmol/L    Anion Gap 5 2 - 11 mmol/L    Glucose 124 (H) 65 - 100 mg/dL    BUN 17 8 - 23 MG/DL    Creatinine 1.30 0.8 - 1.5 MG/DL    Est, Glom Filt Rate 58 (L) >60 ml/min/1.73m2    Calcium 9.2 8.3 - 10.4 MG/DL    Total Bilirubin 0.3 0.2 - 1.1 MG/DL    ALT 23 12 - 65 U/L    AST 12 (L) 15 - 37 U/L    Alk Phosphatase 90 50 - 136 U/L    Total Protein 6.7 6.3 - 8.2 g/dL    Albumin 3.1 (L) 3.2 - 4.6 g/dL    Globulin 3.6 2.8 - 4.5 g/dL    Albumin/Globulin Ratio 0.9 0.4 - 1.6     APTT    Collection Time: 11/12/22  9:48 PM   Result Value Ref Range    PTT 37.0 (H) 24.5 - 34.2 SEC   Protime-INR    Collection Time: 11/12/22  9:48 PM   Result Value Ref Range    Protime 13.1 12.6 - 14.3 sec    INR 1.0     Troponin    Collection Time: 11/12/22  9:48 PM   Result Value Ref Range    Troponin, High Sensitivity 593.9 (HH) 0 - 14 pg/mL         Assessment/Plan:   Principal Problem:    ACS (acute coronary syndrome) -- to CCL now for emergent LHC with Dr. Anastasiya Rogel, will plan for ICU admission post-op with initiation of GDMT as appropriate, HTN on arrival and likely multiple other underlying and undiagnosed problems, will check ECHO in AM, lipids, A1C as well    Active Problems:    Tobacco abuse -- cessation needed      Marijuana abuse -- cessation needed        REGAN Martinez - CNP-C  11/13/2022  12:37 AM

## 2022-11-13 NOTE — ED TRIAGE NOTES
Per patient nose bleed x2.5 hours prior to arrival. Denies blood thinners. Denies injury. Nose clamped currently. Bleeding uncontrolled at this time.

## 2022-11-13 NOTE — PROGRESS NOTES
TRANSFER - OUT REPORT:    Verbal report given to RN on Bong Garces  being transferred to [facility] for routine progression of patient care       Report consisted of patient's Situation, Background, Assessment and   Recommendations(SBAR). Information from the following report(s) Nurse Handoff Report was reviewed with the receiving nurse. [place] Assessment: No data recorded  Lines:   Peripheral IV 11/12/22 Left Antecubital (Active)   Site Assessment Clean, dry & intact 11/12/22 2236   Line Status Blood return noted 11/12/22 2236   Phlebitis Assessment No symptoms 11/12/22 2236   Infiltration Assessment 0 11/12/22 2236       Peripheral IV 11/12/22 Right Antecubital (Active)   Site Assessment Clean, dry & intact 11/12/22 2347   Line Status Blood return noted 11/12/22 2347   Phlebitis Assessment No symptoms 11/12/22 2347   Infiltration Assessment 0 11/12/22 2347        Opportunity for questions and clarification was provided.       Patient transported with:  Registered Nurse    FER Lee  1 stent to LAD  RRA - 12    2 mg Versed  50 mcg Fentanyl  5000 units Heparin  Integrelin bolus and gtt x 2, gtt to run for 6 hours  100 mg IV lidocaine  600 mg Plavix  30 mg Mylanta

## 2022-11-13 NOTE — PROGRESS NOTES
TRANSFER - OUT REPORT:    Verbal report given to Harvinder Wallis RN on Bemary ellen Prows  being transferred to room 324, tele for routine progression of patient care       Report consisted of patient's Situation, Background, Assessment and   Recommendations(SBAR). Information from the following report(s) Nurse Handoff Report, Index, ED Encounter Summary, MAR, and Cardiac Rhythm NSR  was reviewed with the receiving nurse. [place] Assessment: No data recorded  Lines:   Peripheral IV 11/12/22 Left Antecubital (Active)   Site Assessment Clean, dry & intact 11/13/22 0701   Line Status Normal saline locked; Capped 11/13/22 0701   Line Care Connections checked and tightened 11/13/22 0701   Phlebitis Assessment No symptoms 11/13/22 0701   Infiltration Assessment 0 11/13/22 0701   Alcohol Cap Used Yes 11/13/22 0701   Dressing Status Clean, dry & intact 11/13/22 0701   Dressing Type Transparent 11/13/22 0701       Peripheral IV 11/12/22 Right Antecubital (Active)   Site Assessment Clean, dry & intact 11/13/22 0701   Line Status Infusing;Capped 11/13/22 0701   Line Care Connections checked and tightened 11/13/22 0701   Phlebitis Assessment No symptoms 11/13/22 0701   Infiltration Assessment 0 11/13/22 0701   Alcohol Cap Used Yes 11/13/22 0701   Dressing Status Clean, dry & intact 11/13/22 0701   Dressing Type Transparent 11/13/22 0701        Opportunity for questions and clarification was provided.       Patient transported with:  Monitor and Registered Nurse    All belongings taken with patient including boots/clothing and stent ID card

## 2022-11-13 NOTE — PROGRESS NOTES
Admission skin assessment completed with Espinoza Tracey RN and reveals the following: Patient's skin is warm, dry, and color is appropriate for ethnicity. Bilateral feet are dry, cracked, and flaky. The sacrum is pink but blanchable. Allevyn covering sacrum and patient encouraged to change positions occasionally while in bed. There is a right radial cath site that is clean, dry, and intact with no hematoma. There is a mid chest wound that is dressed with gauze and Tegaderm. Wound is consulted.

## 2022-11-13 NOTE — PROGRESS NOTES
TRANSFER - IN REPORT:    Verbal report received from CLAUDIA Pat on Lenoria Minors  being received from Cath Lab for routine progression of patient care      Report consisted of patient's Situation, Background, Assessment and   Recommendations(SBAR). Information from the following report(s) Adult Overview, Intake/Output, MAR, Recent Results, and Cardiac Rhythm NSR/Sinus Tach  was reviewed with the receiving nurse. Opportunity for questions and clarification was provided. Assessment will be completed upon patient's arrival to unit and care assumed.

## 2022-11-13 NOTE — PROGRESS NOTES
[facility] CARDIOLOGY PROGRESS NOTE           11/13/2022 10:30 AM    Admit Date: 11/12/2022      Subjective:   Patient resting comfortably in bed. He has had no additional nasal bleeding. He denies chest pain. Hemodynamics are stable. ROS:  Cardiovascular:  As noted above    Objective:      Vitals:    11/13/22 0615 11/13/22 0630 11/13/22 0701 11/13/22 0800   BP:  123/64 117/67 127/67   Pulse: 69 67 69 69   Resp: 20 17 26 28   Temp:   97.7 °F (36.5 °C)    TempSrc:   Oral    SpO2: 95% 94% 95% 96%   Weight:       Height:           Physical Exam:  General-No Acute Distress  Neck- supple, no JVD  CV- regular rate and rhythm no MRG  Lung- clear bilaterally  Abd- soft, nontender, nondistended  Ext- no edema bilaterally. Skin- warm and dry    Data Review:   Recent Labs     11/12/22 2148 11/13/22  0452    138   K 3.8 4.2   MG  --  2.4   BUN 17 23   WBC 14.1* 16.1*   HGB 14.9 12.3*   HCT 44.3 37.5*    350   INR 1.0  --    CHOL  --  146   HDL  --  27*       Assessment/Plan:     Principal Problem:    ST elevation myocardial infarction involving left anterior descending (LAD) coronary artery (HCC)  Plan: Patient status post PCI and stenting of ostial/proximal LAD. He is on dual antiplatelet therapy. He has completed Integrilin infusion. We will remove bilateral nasal packing due to epistaxis. Continue aspirin and clopidogrel. Awaiting echocardiogram.  EF low normal at cardiac catheterization yesterday. Continue high intensity statin therapy with atorvastatin 80 mg daily. Continue metoprolol succinate and losartan.   Active Problems:    Tobacco abuse  Plan: Cessation education    Marijuana abuse  Plan: Cessation education    Transfer to telemetry floor        Evie Harkins MD  11/13/2022 10:30 AM

## 2022-11-13 NOTE — ED PROVIDER NOTES
Emergency Department Provider Note                   PCP:                None Provider               Age: 74 y.o. Sex: male       ICD-10-CM    1. Epistaxis  R04.0       2. ST elevation myocardial infarction (STEMI), unspecified artery ([facility])  I21.3       3. STEMI (ST elevation myocardial infarction) [facility])  I21.3 Cardiac procedure     Cardiac procedure          DISPOSITION Decision To Admit 11/12/2022 11:33:26 PM       MDM  Number of Diagnoses or Management Options  Epistaxis  ST elevation myocardial infarction (STEMI), unspecified artery ([facility] [address]  Diagnosis management comments: Patient does not go to the doctor and denies having any medical problems. Came in this evening for epistaxis. While here developed chest pain shortness of breath with tachycardia and noted to have ST elevation in V2 V3 and V4. STEMI alert was called. Patient started on heparin.        Amount and/or Complexity of Data Reviewed  Clinical lab tests: ordered and reviewed  Tests in the radiology section of CPT®: ordered and reviewed  Tests in the medicine section of CPT®: ordered and reviewed    Patient Progress  Patient progress: stable             Orders Placed This Encounter   Procedures    EPISTAXIS MANAGEMENT    Critical Care    XR CHEST PORTABLE    CBC with Auto Differential    Comprehensive Metabolic Panel    APTT    Protime-INR    Troponin    CBC    Anti-Xa, Unfractionated Heparin    EKG 12 Lead    Insert peripheral IV        Medications   0.9 % sodium chloride bolus (1,000 mLs IntraVENous New Bag 11/12/22 2323)   nitroGLYCERIN (NITROSTAT) SL tablet 0.4 mg (0.4 mg SubLINGual Given 11/12/22 2331)   heparin (porcine) injection 4,000 Units (has no administration in time range)   heparin (porcine) injection 2,000 Units (has no administration in time range)   heparin 25,000 units in dextrose 5% 250 mL (premix) infusion (has no administration in time range)   oxymetazoline (AFRIN) 0.05 % nasal spray 2 spray (2 sprays Each Nostril Given 11/12/22 2138)   aspirin chewable tablet 324 mg (324 mg Oral Given 11/12/22 2331)   nitroglycerin (NITRO-BID) 2 % ointment 1 inch (1 inch Topical Given 11/12/22 2335)   heparin (porcine) injection 4,000 Units (4,000 Units IntraVENous Given 11/12/22 2342)   ondansetron (ZOFRAN) injection 4 mg (4 mg IntraVENous Given 11/12/22 2347)       New Prescriptions    No medications on file        Param Miles is a 74 y.o. male who presents to the Emergency Department with chief complaint of    Chief Complaint   Patient presents with    Epistaxis      Patient started with epistaxis around 1930. Was unable to get it to stop bleeding so came in. Has been waiting for couple hours. Just prior to my evaluation patient developed some sternal chest discomfort and shortness of breath. I came in to evaluate for the epistaxis. Bleeding had stopped just prior to my exam.  While examining the patient his pain seemed to worsen in his chest and a repeat EKG was shot. I saw both EKGs at the same time and noticed ST elevation in V2 V3 and V4 with some depression and 3 and aVF. A STEMI alert was called. The history is provided by the patient. No  was used. Epistaxis  Location:  Bilateral  Severity:  Moderate  Duration:  4 hours  Timing:  Constant  Progression:  Resolved  Chronicity:  New  Context: not anticoagulants    Relieved by:  Applying pressure  Worsened by:  Nothing  Associated symptoms: blood in oropharynx    Associated symptoms: no cough, no fever, no headaches and no sore throat      All other systems reviewed and are negative unless otherwise stated in the history of present illness section. Review of Systems   Constitutional:  Negative for chills and fever. HENT:  Positive for nosebleeds. Negative for rhinorrhea and sore throat. Respiratory:  Positive for shortness of breath. Negative for cough. Cardiovascular:  Positive for chest pain. Negative for palpitations.    Gastrointestinal: Negative for abdominal pain, constipation, diarrhea, nausea and vomiting. Genitourinary:  Negative for dysuria and hematuria. Musculoskeletal:  Negative for back pain and neck pain. Skin:  Negative for color change and rash. Neurological:  Negative for numbness and headaches. All other systems reviewed and are negative. No past medical history on file. No past surgical history on file. No family history on file. Social History     Socioeconomic History    Marital status: Single        Allergies: Diphenhydramine and Codeine    Previous Medications    No medications on file        Vitals signs and nursing note reviewed. Patient Vitals for the past 4 hrs:   Temp Pulse Resp BP SpO2   11/12/22 2345 -- (!) 106 22 (!) 154/103 98 %   11/12/22 2340 -- (!) 104 22 (!) 165/108 97 %   11/12/22 2335 -- (!) 105 21 (!) 174/112 98 %   11/12/22 2330 -- (!) 113 18 (!) 175/124 100 %   11/12/22 2315 -- 91 22 (!) 156/94 99 %   11/12/22 2310 -- 96 19 -- --   11/12/22 2300 -- -- -- 120/79 98 %   11/12/22 2238 -- -- -- -- 92 %   11/12/22 2236 -- -- -- 90/61 --   11/12/22 2133 (!) 96 °F (35.6 °C) 96 18 (!) 158/91 92 %          Physical Exam  Vitals and nursing note reviewed. Constitutional:       Appearance: Normal appearance. HENT:      Head: Normocephalic and atraumatic. Nose:      Comments: Blood present in both nares. No active bleeding. Cardiovascular:      Rate and Rhythm: Regular rhythm. Tachycardia present. Pulmonary:      Effort: Pulmonary effort is normal.      Breath sounds: Normal breath sounds. No wheezing. Abdominal:      General: Bowel sounds are normal.      Palpations: Abdomen is soft. Tenderness: There is no abdominal tenderness. Musculoskeletal:         General: No swelling. Normal range of motion. Cervical back: Normal range of motion. No tenderness. Skin:     General: Skin is warm and dry. Neurological:      Mental Status: He is alert.         Epistaxis Mgmt    Date/Time: 11/12/2022 11:53 PM  Performed by: Carlota Ren MD  Authorized by: Carlota Ren MD     Consent:     Consent obtained:  Verbal    Consent given by:  Patient    Risks discussed:  Pain  Universal protocol:     Procedure explained and questions answered to patient or proxy's satisfaction: yes      Immediately prior to procedure, a time out was called: yes      Patient identity confirmed:  Verbally with patient and arm band  Anesthesia:     Anesthesia method:  None  Procedure details:     Treatment site:  Unable to specify    Treatment method:  Nasal balloon    Treatment complexity:  Extensive    Treatment episode: initial    Post-procedure details:     Assessment:  Bleeding stopped    Procedure completion:  Tolerated with difficulty  Comments:      Pt had bilateral rhino rockets inserted.    Critical Care  Performed by: Carlota Ren MD  Authorized by: Carlota Ren MD     Critical care provider statement:     Critical care time (minutes):  35    Critical care time was exclusive of:  Separately billable procedures and treating other patients    Critical care was necessary to treat or prevent imminent or life-threatening deterioration of the following conditions:  Cardiac failure and circulatory failure    Critical care was time spent personally by me on the following activities:  Development of treatment plan with patient or surrogate, discussions with consultants, evaluation of patient's response to treatment, examination of patient, interpretation of cardiac output measurements, obtaining history from patient or surrogate, ordering and performing treatments and interventions, ordering and review of laboratory studies, ordering and review of radiographic studies, pulse oximetry and re-evaluation of patient's condition    I assumed direction of critical care for this patient from another provider in my specialty: no      Care discussed with: admitting provider      ED EKG Interpretation  EKG was interpreted in the absence of a cardiologist.    Rate: 116  EKG Interpretation: EKG Interpretation: sinus rhythm  ST Segments: STEMI Elevated in V2, 3, AND 4. Results for orders placed or performed during the hospital encounter of 11/12/22   CBC with Auto Differential   Result Value Ref Range    WBC 14.1 (H) 4.3 - 11.1 K/uL    RBC 4.70 4.23 - 5.6 M/uL    Hemoglobin 14.9 13.6 - 17.2 g/dL    Hematocrit 44.3 41.1 - 50.3 %    MCV 94.3 82 - 102 FL    MCH 31.7 26.1 - 32.9 PG    MCHC 33.6 31.4 - 35.0 g/dL    RDW 13.1 11.9 - 14.6 %    Platelets 993 657 - 404 K/uL    MPV 10.0 9.4 - 12.3 FL    nRBC 0.00 0.0 - 0.2 K/uL    Differential Type AUTOMATED      Seg Neutrophils 69 43 - 78 %    Lymphocytes 19 13 - 44 %    Monocytes 8 4.0 - 12.0 %    Eosinophils % 2 0.5 - 7.8 %    Basophils 1 0.0 - 2.0 %    Immature Granulocytes 1 0.0 - 5.0 %    Segs Absolute 9.8 (H) 1.7 - 8.2 K/UL    Absolute Lymph # 2.7 0.5 - 4.6 K/UL    Absolute Mono # 1.2 0.1 - 1.3 K/UL    Absolute Eos # 0.3 0.0 - 0.8 K/UL    Basophils Absolute 0.1 0.0 - 0.2 K/UL    Absolute Immature Granulocyte 0.1 0.0 - 0.5 K/UL   Comprehensive Metabolic Panel   Result Value Ref Range    Sodium 141 133 - 143 mmol/L    Potassium 3.8 3.5 - 5.1 mmol/L    Chloride 111 (H) 101 - 110 mmol/L    CO2 25 21 - 32 mmol/L    Anion Gap 5 2 - 11 mmol/L    Glucose 124 (H) 65 - 100 mg/dL    BUN 17 8 - 23 MG/DL    Creatinine 1.30 0.8 - 1.5 MG/DL    Est, Glom Filt Rate 58 (L) >60 ml/min/1.73m2    Calcium 9.2 8.3 - 10.4 MG/DL    Total Bilirubin 0.3 0.2 - 1.1 MG/DL    ALT 23 12 - 65 U/L    AST 12 (L) 15 - 37 U/L    Alk Phosphatase 90 50 - 136 U/L    Total Protein 6.7 6.3 - 8.2 g/dL    Albumin 3.1 (L) 3.2 - 4.6 g/dL    Globulin 3.6 2.8 - 4.5 g/dL    Albumin/Globulin Ratio 0.9 0.4 - 1.6          XR CHEST PORTABLE    (Results Pending)                         Voice dictation software was used during the making of this note.   This software is not perfect and grammatical and other typographical errors may be present. This note has not been completely proofread for errors.      Richard Basurto III, MD  11/12/22 8045 Chucky Sales III, MD  11/12/22 1692

## 2022-11-14 LAB
ANION GAP SERPL CALC-SCNC: 3 MMOL/L (ref 2–11)
BUN SERPL-MCNC: 21 MG/DL (ref 8–23)
CALCIUM SERPL-MCNC: 8.2 MG/DL (ref 8.3–10.4)
CHLORIDE SERPL-SCNC: 113 MMOL/L (ref 101–110)
CO2 SERPL-SCNC: 22 MMOL/L (ref 21–32)
CREAT SERPL-MCNC: 1.1 MG/DL (ref 0.8–1.5)
ERYTHROCYTE [DISTWIDTH] IN BLOOD BY AUTOMATED COUNT: 13.4 % (ref 11.9–14.6)
GLUCOSE SERPL-MCNC: 116 MG/DL (ref 65–100)
HCT VFR BLD AUTO: 34.5 % (ref 41.1–50.3)
HGB BLD-MCNC: 11 G/DL (ref 13.6–17.2)
MAGNESIUM SERPL-MCNC: 2.2 MG/DL (ref 1.8–2.4)
MCH RBC QN AUTO: 31.4 PG (ref 26.1–32.9)
MCHC RBC AUTO-ENTMCNC: 31.9 G/DL (ref 31.4–35)
MCV RBC AUTO: 98.6 FL (ref 82–102)
NRBC # BLD: 0 K/UL (ref 0–0.2)
PLATELET # BLD AUTO: 286 K/UL (ref 150–450)
PMV BLD AUTO: 10 FL (ref 9.4–12.3)
POTASSIUM SERPL-SCNC: 3.9 MMOL/L (ref 3.5–5.1)
RBC # BLD AUTO: 3.5 M/UL (ref 4.23–5.6)
SODIUM SERPL-SCNC: 138 MMOL/L (ref 133–143)
WBC # BLD AUTO: 16.1 K/UL (ref 4.3–11.1)

## 2022-11-14 PROCEDURE — 1100000003 HC PRIVATE W/ TELEMETRY

## 2022-11-14 PROCEDURE — 6370000000 HC RX 637 (ALT 250 FOR IP): Performed by: INTERNAL MEDICINE

## 2022-11-14 PROCEDURE — 83735 ASSAY OF MAGNESIUM: CPT

## 2022-11-14 PROCEDURE — 80048 BASIC METABOLIC PNL TOTAL CA: CPT

## 2022-11-14 PROCEDURE — 2580000003 HC RX 258: Performed by: NURSE PRACTITIONER

## 2022-11-14 PROCEDURE — 36415 COLL VENOUS BLD VENIPUNCTURE: CPT

## 2022-11-14 PROCEDURE — 88305 TISSUE EXAM BY PATHOLOGIST: CPT

## 2022-11-14 PROCEDURE — 6360000002 HC RX W HCPCS: Performed by: NURSE PRACTITIONER

## 2022-11-14 PROCEDURE — 2580000003 HC RX 258: Performed by: INTERNAL MEDICINE

## 2022-11-14 PROCEDURE — 85027 COMPLETE CBC AUTOMATED: CPT

## 2022-11-14 PROCEDURE — 99232 SBSQ HOSP IP/OBS MODERATE 35: CPT | Performed by: INTERNAL MEDICINE

## 2022-11-14 PROCEDURE — 88304 TISSUE EXAM BY PATHOLOGIST: CPT

## 2022-11-14 PROCEDURE — 99221 1ST HOSP IP/OBS SF/LOW 40: CPT | Performed by: STUDENT IN AN ORGANIZED HEALTH CARE EDUCATION/TRAINING PROGRAM

## 2022-11-14 PROCEDURE — 11104 PUNCH BX SKIN SINGLE LESION: CPT | Performed by: STUDENT IN AN ORGANIZED HEALTH CARE EDUCATION/TRAINING PROGRAM

## 2022-11-14 RX ADMIN — SODIUM CHLORIDE, PRESERVATIVE FREE 5 ML: 5 INJECTION INTRAVENOUS at 19:58

## 2022-11-14 RX ADMIN — ASPIRIN 81 MG 81 MG: 81 TABLET ORAL at 10:01

## 2022-11-14 RX ADMIN — SODIUM CHLORIDE, PRESERVATIVE FREE 10 ML: 5 INJECTION INTRAVENOUS at 10:00

## 2022-11-14 RX ADMIN — CLOPIDOGREL BISULFATE 75 MG: 75 TABLET ORAL at 10:01

## 2022-11-14 RX ADMIN — Medication: at 19:59

## 2022-11-14 RX ADMIN — LOSARTAN POTASSIUM 25 MG: 25 TABLET, FILM COATED ORAL at 10:01

## 2022-11-14 RX ADMIN — CEFTRIAXONE 1000 MG: 1 INJECTION, POWDER, FOR SOLUTION INTRAMUSCULAR; INTRAVENOUS at 01:56

## 2022-11-14 RX ADMIN — ATORVASTATIN CALCIUM 80 MG: 80 TABLET, FILM COATED ORAL at 19:57

## 2022-11-14 RX ADMIN — Medication: at 10:04

## 2022-11-14 RX ADMIN — METOPROLOL SUCCINATE 25 MG: 25 TABLET, FILM COATED, EXTENDED RELEASE ORAL at 10:01

## 2022-11-14 ASSESSMENT — PAIN SCALES - GENERAL
PAINLEVEL_OUTOF10: 0

## 2022-11-14 NOTE — ACP (ADVANCE CARE PLANNING)
Advance Care Planning     General Advance Care Planning (ACP) Conversation    Date of Conversation: 11/12/2022  Conducted with: Patient with Decision Making Capacity    Healthcare Decision Maker:  No healthcare decision makers have been documented. Click here to complete [address] including selection of the Healthcare Decision Maker Relationship (ie \"Primary\")  Today we documented Decision Maker(s) consistent with Legal Next of Kin hierarchy.     Content/Action Overview:  Has NO ACP documents/care preferences - information provided, considering goals and options  Reviewed DNR/DNI and patient elects Full Code (Attempt Resuscitation)        Length of Voluntary ACP Conversation in minutes:  <16 minutes (Non-Billable)    CURTIS Law RN

## 2022-11-14 NOTE — PROGRESS NOTES
Bedside and Verbal shift change report to be given to [facility] (oncoming nurse) by self (offgoing nurse). Report included the following information Nurse Handoff Report, Intake/Output, MAR, and Recent Results.

## 2022-11-14 NOTE — PROGRESS NOTES
[facility] [[email or web address]] CARDIOLOGY PROGRESS NOTE           11/14/2022 4:35 PM    Admit Date: 11/12/2022         Subjective: Doing well denies chest pain nasal rocket removed yesterday no further bleeding. Has a large fungating skin wound in the center of his chest which likely represents some form of malignancy we will ask general surgery for recommendations with regard to management ? Biopsy. ROS:  Cardiovascular:  As noted above    Objective:      Vitals:    11/14/22 0422 11/14/22 0756 11/14/22 1001 11/14/22 1310   BP: 104/70 114/63 114/63 115/72   Pulse: 72 80 81 67   Resp: 16 16  16   Temp: 97.5 °F (36.4 °C) 97.7 °F (36.5 °C)  97.5 °F (36.4 °C)   TempSrc: Oral Oral  Oral   SpO2: 98% 98%  98%   Weight: 172 lb 8 oz (78.2 kg)      Height:               Physical Exam:  General: Well Developed, Well Nourished, No Acute Distress, Alert & Oriented x 3, Appropriate mood  Neck: supple, no JVD  Heart: S1S2 with RRR without murmurs or gallops  Lungs: Clear throughout auscultation bilaterally without adventitious sounds  Abd: soft, nontender, nondistended, with good bowel sounds  Ext: no edema bilaterally  Skin: warm and dry      Data Review:   Recent Labs     11/12/22  2148 11/13/22  0452 11/14/22  0537    138 138   K 3.8 4.2 3.9   MG  --  2.4 2.2   BUN 17 23 21   WBC 14.1* 16.1* 16.1*   HGB 14.9 12.3* 11.0*   HCT 44.3 37.5* 34.5*    350 286   INR 1.0  --   --    CHOL  --  146  --    HDL  --  27*  --        No results for input(s): TNIPOC in the last 72 hours. Invalid input(s): TROIQ        Assessment/Plan:     Principal Problem:    ST elevation myocardial infarction involving left anterior descending (LAD) coronary artery (HCC)  Active Problems:    Tobacco abuse    Marijuana abuse    Epistaxis  Resolved Problems:    * No resolved hospital problems.  *    A/P  1) STEMI - involving the LAD -continue dual antiplatelet therapy with aspirin and Plavix continue to atorvastatin 80 mg  2) Bleeding - seems resolved  3) Chest wall wound -wound team and general surgery consult  4) HTN -well-controlled on Cozaar 25 mg daily metoprolol XL 25 mg daily    Austin Contreras MD  11/14/2022 4:35 PM

## 2022-11-14 NOTE — CARE COORDINATION
Patient admitted with STEMI. Patient underwent C with PCI. CM met with patient and his spouse for assessment. Patient anticipates discharging today or tomorrow. Data documented. CM remains available to assist as needed.

## 2022-11-14 NOTE — PLAN OF CARE
Problem: Pain  Goal: Verbalizes/displays adequate comfort level or baseline comfort level  Outcome: Progressing     Problem: Safety - Adult  Goal: Free from fall injury  Outcome: Progressing  Flowsheets (Taken 11/13/2022 2015 by Malik Pollard RN)  Free From Fall Injury: Instruct family/caregiver on patient safety

## 2022-11-14 NOTE — PROGRESS NOTES
Bedside and Verbal shift change report received from [facility]. Report included the following information Nurse Handoff Report, Intake/Output, MAR, and Recent Results.

## 2022-11-14 NOTE — CONSULTS
GENERAL SURGERY CONSULT    Name:  Keanu Coughlin  Date/Time of Admission: 2022 10:23 PM  CSN: [id number]  Attending Provider: Kori Felix MD  Room/Bed:  [facility]/  : 1950  74 y.o. SUBJECTIVE:    Reason for Consultation:  chest wound    HPI:  Keanu Coughlin  is a 74 y.o. male who presented to with acute epistaxis. Pt was noted to have an acute MI and has been cathed. We have been consulted for a chronic chest wound. Pt states he had a foreign body there a long time ago which worked its way out a couple years after that and since then he continues to have a chronic wound from the area. Pt states it occasionally drains. Pt states it will get small er in size but has never gone away. Denies hx of cancer. Does have FH of melanoma    Primary Care Physician:  None Provider     Allergies: Allergies   Allergen Reactions    Diphenhydramine      Other reaction(s): Joint swelling-Intolerance    Codeine Rash        Outpatient Meds:  No medications prior to admission. Past Medical History:  No past medical history on file.      Past Surgical History:  Past Surgical History:   Procedure Laterality Date    CARDIAC PROCEDURE N/A 2022    Coronary angiography performed by Kori Felix MD at [address] [place] [address] CATH LAB    CARDIAC PROCEDURE N/A 2022    Percutaneous coronary intervention performed by Kori Felix MD at [address] [place] [address] CATH LAB        Social History:  Social History     Socioeconomic History    Marital status: Single     Spouse name: Not on file    Number of children: Not on file    Years of education: Not on file    Highest education level: Not on file   Occupational History    Not on file   Tobacco Use    Smoking status: Every Day     Packs/day: 1.00     Types: Cigarettes    Smokeless tobacco: Never   Substance and Sexual Activity    Alcohol use: Not on file    Drug use: Not on file    Sexual activity: Not on file   Other Topics Concern    Not on file   Social History Narrative Not on file     Social Determinants of Health     Financial Resource Strain: Not on file   Food Insecurity: Not on file   Transportation Needs: Not on file   Physical Activity: Not on file   Stress: Not on file   Social Connections: Not on file   Intimate Partner Violence: Not on file   Housing Stability: Not on file       Family History:  No family history on file. Review of Systems: 10 point review of systems reviewed and negative unless noted above in the HPI      OBJECTIVE:    Vital Signs:  Vitals:    11/14/22 1001   BP: 114/63   Pulse: 81   Resp:    Temp:    SpO2:         Physical Exam:  General Appearance AOx3, in no acute distress  Eyes Conjunctivae/corneas clear. PERRL, EOM's intact. No scleral icterus  Ears, Nose, Throat ENT exam normal, no neck nodes or sinus tenderness  Neck supple, no significant adenopathy. No notable JVD  Respiratory No chest wall deformities or tenderness, respiratory effort normal, no use of accessory muscles. Cardiovascular RRR. No chest wall tenderness. Gastrointestinal Abdomen soft, nontender, nondistended. BS x4. No rebound, guarding, or rigidity present. No palpable masses. No CVA tenderness  Lymphatics No palpable lymphadenopathy, no hepatosplenomegaly  Musculoskeletal No joint tenderness, deformity or swelling. Full ROM UE/LE. Distal pulses intact UE/LE. No edema, cyanosis, or venous stasis changes.   Skin Large chest wound with center slightly draining and raised edges  Neurological alert, oriented, normal speech, no focal findings or movement disorder noted, CN II-XII intact  Psychiatric Alert and oriented, appropriate affect    Labs:   Recent Results (from the past 24 hour(s))   Transthoracic echocardiogram (TTE) complete with contrast, bubble, strain, and 3D PRN    Collection Time: 11/13/22  5:20 PM   Result Value Ref Range    LV EDV A2C 138 mL    LV EDV A4C 142 mL    LV ESV A2C 79 mL    LV ESV A4C 86 mL    IVSd 1.2 (A) 0.6 - 1.0 cm    LVIDd 5.0 4.2 - 5.9 cm    LVIDs 4.0 cm    LVOT Diameter 2.1 cm    LVPWd 1.2 (A) 0.6 - 1.0 cm    LV E' Lateral Velocity 7 cm/s    LV E' Septal Velocity 6 cm/s    LV Ejection Fraction A2C 42 %    LV Ejection Fraction A4C 40 %    EF BP 42 (A) 55 - 100 %    LVOT Area 3.5 cm2    LA Minor Axis 5.3 cm    LA Major [place] 5.0 cm    LA Area 2C 19.1 cm2    LA Area 4C 15.2 cm2    LA Volume 2C 56 18 - 58 mL    LA Volume 4C 35 18 - 58 mL    LA Volume BP 45 18 - 58 mL    LA Diameter 3.6 cm    AV Mean Velocity 1.0 m/s    AV Mean Gradient 5 mmHg    AV VTI 25.5 cm    AV Peak Velocity 1.5 m/s    AV Peak Gradient 9 mmHg    Aortic Root 2.7 cm    Ascending Aorta 3.0 cm    IVC Proxmal 1.8 cm    MV E Wave Deceleration Time 205.0 ms    MV A Velocity 1.23 m/s    MV E Velocity 1.10 m/s    PV .0 ms    PV Max Velocity 1.2 m/s    PV Peak Gradient 6 mmHg    Est. RA Pressure 3 mmHg    RV Basal Dimension 2.7 cm    RV Free Wall Peak S' 15 cm/s    TAPSE 1.6 (A) 1.7 cm    Body Surface Area 1.98 m2    Fractional Shortening 2D 20 28 - 44 %    LV ESV Index A4C 45 mL/m2    LV EDV Index A4C 74 mL/m2    LV ESV Index A2C 41 mL/m2    LV EDV Index A2C 72 mL/m2    LVIDd Index 2.59 cm/m2    LVIDs Index 2.07 cm/m2    LV RWT Ratio 0.48     LV Mass 2D 233.7 (A) 88 - 224 g    LV Mass 2D Index 121.1 (A) 49 - 115 g/m2    MV E/A 0.89     E/E' Ratio (Averaged) 17.02     E/E' Lateral 15.71     E/E' Septal 18.33     LA Volume Index BP 23 16 - 34 ml/m2    LA Volume Index 2C 29 16 - 34 mL/m2    LA Volume Index 4C 18 16 - 34 mL/m2    LA Size Index 1.87 cm/m2    LA/AO Root Ratio 1.33     Ao Root Index 1.40 cm/m2    Ascending Aorta Index 1.55 cm/m2   Basic Metabolic Panel    Collection Time: 11/14/22  5:37 AM   Result Value Ref Range    Sodium 138 133 - 143 mmol/L    Potassium 3.9 3.5 - 5.1 mmol/L    Chloride 113 (H) 101 - 110 mmol/L    CO2 22 21 - 32 mmol/L    Anion Gap 3 2 - 11 mmol/L    Glucose 116 (H) 65 - 100 mg/dL    BUN 21 8 - 23 MG/DL    Creatinine 1.10 0.8 - 1.5 MG/DL    Est, Glom Filt Rate >60 >60 ml/min/1.73m2    Calcium 8.2 (L) 8.3 - 10.4 MG/DL   CBC    Collection Time: 11/14/22  5:37 AM   Result Value Ref Range    WBC 16.1 (H) 4.3 - 11.1 K/uL    RBC 3.50 (L) 4.23 - 5.6 M/uL    Hemoglobin 11.0 (L) 13.6 - 17.2 g/dL    Hematocrit 34.5 (L) 41.1 - 50.3 %    MCV 98.6 82 - 102 FL    MCH 31.4 26.1 - 32.9 PG    MCHC 31.9 31.4 - 35.0 g/dL    RDW 13.4 11.9 - 14.6 %    Platelets 188 141 - 328 K/uL    MPV 10.0 9.4 - 12.3 FL    nRBC 0.00 0.0 - 0.2 K/uL   Magnesium    Collection Time: 11/14/22  5:37 AM   Result Value Ref Range    Magnesium 2.2 1.8 - 2.4 mg/dL       Imaging:       ASSESSMENT:    Principal Problem:    ST elevation myocardial infarction involving left anterior descending (LAD) coronary artery (HCC)  Active Problems:    Tobacco abuse    Marijuana abuse    Epistaxis  Resolved Problems:    * No resolved hospital problems. *       PLAN:    Overall I am concerned that this could ultimately be a underlying cancer. I am going to get a punch biopsy of this lesion to r/o cancer. Until then continue with local wound care  Medical management per primary    Procedure in detail:  Consent was obtained from the patient. The area was prepped and draped. I then anesthetized the area. I utilized a 3mm punch to obtain multiple samples. Hemostasis was noted. Patient tolerated the procedure well. No complications. Thank you for allowing us to participate in the care of your patient!     Electronically signed by:  Neha Anne DO  11/14/2022

## 2022-11-14 NOTE — PROGRESS NOTES
RRT Clinical Rounding Nurse Progress Report      SUBJECTIVE: Called to assess patient secondary to transfer from critical care. Vitals:    11/14/22 0000 11/14/22 0422 11/14/22 0756 11/14/22 1001   BP: 119/70 104/70 114/63 114/63   Pulse: 97 72 80 81   Resp: 14 16 16    Temp: 98.1 °F (36.7 °C) 97.5 °F (36.4 °C) 97.7 °F (36.5 °C)    TempSrc: Oral Oral Oral    SpO2: 98% 98% 98%    Weight:  172 lb 8 oz (78.2 kg)     Height:            DETERIORATION INDEX SCORE: 21      ASSESSMENT:  Pt lying awake in bed, in NAD. A&Ox4. On room air. NSR, HR 76 on remote telemetry. BP stable. Denies complaints. PLAN:  Will follow per RRT Clinical Rounding Program protocol.     Jocelyne De La Rosa, [address] [facility] [address]: [facility]: [phone]

## 2022-11-14 NOTE — WOUND CARE
Was consulted for chest wound that patient stated has been present over 3 years. It is 7.5x 8.0x 0cm tumor. Irregular surface. Consistent with skin cancer. Surgeon came in and did punch biopsy and discussed plan with patient and family in room. She will follow OP and help navigate next step (?plastic surgery). Answered all patient questions. Placed xeroform dressing to help lessen trauma with dressing removal after holding pressure to stop bleeding. Will sign off. Please call if needed further.

## 2022-11-15 PROBLEM — A49.8 PSEUDOMONAS AERUGINOSA INFECTION: Status: ACTIVE | Noted: 2022-11-15

## 2022-11-15 LAB
ANION GAP SERPL CALC-SCNC: 7 MMOL/L (ref 2–11)
BUN SERPL-MCNC: 22 MG/DL (ref 8–23)
CALCIUM SERPL-MCNC: 8.6 MG/DL (ref 8.3–10.4)
CHLORIDE SERPL-SCNC: 111 MMOL/L (ref 101–110)
CO2 SERPL-SCNC: 21 MMOL/L (ref 21–32)
CREAT SERPL-MCNC: 1.1 MG/DL (ref 0.8–1.5)
ERYTHROCYTE [DISTWIDTH] IN BLOOD BY AUTOMATED COUNT: 13.3 % (ref 11.9–14.6)
GLUCOSE SERPL-MCNC: 111 MG/DL (ref 65–100)
HCT VFR BLD AUTO: 33.4 % (ref 41.1–50.3)
HGB BLD-MCNC: 10.8 G/DL (ref 13.6–17.2)
MAGNESIUM SERPL-MCNC: 2.4 MG/DL (ref 1.8–2.4)
MCH RBC QN AUTO: 31.5 PG (ref 26.1–32.9)
MCHC RBC AUTO-ENTMCNC: 32.3 G/DL (ref 31.4–35)
MCV RBC AUTO: 97.4 FL (ref 82–102)
NRBC # BLD: 0 K/UL (ref 0–0.2)
PLATELET # BLD AUTO: 316 K/UL (ref 150–450)
PMV BLD AUTO: 10.4 FL (ref 9.4–12.3)
POTASSIUM SERPL-SCNC: 4 MMOL/L (ref 3.5–5.1)
RBC # BLD AUTO: 3.43 M/UL (ref 4.23–5.6)
SODIUM SERPL-SCNC: 139 MMOL/L (ref 133–143)
WBC # BLD AUTO: 14.8 K/UL (ref 4.3–11.1)

## 2022-11-15 PROCEDURE — 87040 BLOOD CULTURE FOR BACTERIA: CPT

## 2022-11-15 PROCEDURE — 83735 ASSAY OF MAGNESIUM: CPT

## 2022-11-15 PROCEDURE — 85027 COMPLETE CBC AUTOMATED: CPT

## 2022-11-15 PROCEDURE — 6370000000 HC RX 637 (ALT 250 FOR IP): Performed by: INTERNAL MEDICINE

## 2022-11-15 PROCEDURE — 6360000002 HC RX W HCPCS: Performed by: INTERNAL MEDICINE

## 2022-11-15 PROCEDURE — 2580000003 HC RX 258: Performed by: INTERNAL MEDICINE

## 2022-11-15 PROCEDURE — 99233 SBSQ HOSP IP/OBS HIGH 50: CPT | Performed by: STUDENT IN AN ORGANIZED HEALTH CARE EDUCATION/TRAINING PROGRAM

## 2022-11-15 PROCEDURE — 36415 COLL VENOUS BLD VENIPUNCTURE: CPT

## 2022-11-15 PROCEDURE — 80048 BASIC METABOLIC PNL TOTAL CA: CPT

## 2022-11-15 PROCEDURE — 1100000003 HC PRIVATE W/ TELEMETRY

## 2022-11-15 RX ORDER — CIPROFLOXACIN 500 MG/1
750 TABLET, FILM COATED ORAL EVERY 12 HOURS SCHEDULED
Status: DISCONTINUED | OUTPATIENT
Start: 2022-11-16 | End: 2022-11-18 | Stop reason: HOSPADM

## 2022-11-15 RX ORDER — LEVOFLOXACIN 5 MG/ML
750 INJECTION, SOLUTION INTRAVENOUS EVERY 24 HOURS
Status: DISCONTINUED | OUTPATIENT
Start: 2022-11-15 | End: 2022-11-15

## 2022-11-15 RX ADMIN — ATORVASTATIN CALCIUM 80 MG: 80 TABLET, FILM COATED ORAL at 20:56

## 2022-11-15 RX ADMIN — METOPROLOL SUCCINATE 25 MG: 25 TABLET, FILM COATED, EXTENDED RELEASE ORAL at 09:05

## 2022-11-15 RX ADMIN — LOSARTAN POTASSIUM 25 MG: 25 TABLET, FILM COATED ORAL at 09:04

## 2022-11-15 RX ADMIN — SODIUM CHLORIDE, PRESERVATIVE FREE 10 ML: 5 INJECTION INTRAVENOUS at 20:56

## 2022-11-15 RX ADMIN — Medication: at 20:56

## 2022-11-15 RX ADMIN — SODIUM CHLORIDE, PRESERVATIVE FREE 10 ML: 5 INJECTION INTRAVENOUS at 09:08

## 2022-11-15 RX ADMIN — LEVOFLOXACIN 750 MG: 5 INJECTION, SOLUTION INTRAVENOUS at 10:58

## 2022-11-15 RX ADMIN — VANCOMYCIN HYDROCHLORIDE 1750 MG: 100 INJECTION, POWDER, LYOPHILIZED, FOR SOLUTION INTRAVENOUS at 11:00

## 2022-11-15 RX ADMIN — CEFTRIAXONE 1000 MG: 1 INJECTION, POWDER, FOR SOLUTION INTRAMUSCULAR; INTRAVENOUS at 00:19

## 2022-11-15 RX ADMIN — CLOPIDOGREL BISULFATE 75 MG: 75 TABLET ORAL at 09:05

## 2022-11-15 RX ADMIN — ASPIRIN 81 MG 81 MG: 81 TABLET ORAL at 09:05

## 2022-11-15 ASSESSMENT — PAIN SCALES - GENERAL
PAINLEVEL_OUTOF10: 0

## 2022-11-15 NOTE — PROGRESS NOTES
Cardiac Rehab: Spoke with patient regarding referral to cardiac rehab. Patient meets admission criteria based on STEMI with PCI (11/14/22). Written information about Cardiac Rehab given and reviewed with patient. Discussed lifestyle modifications to promote cardiac wellness. Patient indicated that he may want to participate in the cardiac rehab program when appropriate after his staged PCI. We will follow up with him when appropriate. His Cardiologist is Dr. Sherryle Cater.       Thank you,  Sherren Ke, BSN, RN  Cardiopulmonary Rehabilitation Nurse Liaison  [facility]

## 2022-11-15 NOTE — PROGRESS NOTES
[facility] Pharmacokinetic Monitoring Service - Vancomycin     Palmira Keller is a 74 y.o. male starting on vancomycin therapy for SSTI. Pharmacy consulted by Dr. Arnold Leung for monitoring and adjustment. Target Concentration: Goal AUC/LANDRY 400-600 mg*hr/L    Additional Antimicrobials: levofloxacin    Patient eligible for piperacillin-tazobactam to cefepime auto-substitution per P&T approved protocol? N/A    Pertinent Laboratory Values: Wt Readings from Last 1 Encounters:   11/15/22 168 lb 14.4 oz (76.6 kg)     Temp Readings from Last 1 Encounters:   11/15/22 97.6 °F (36.4 °C) (Oral)     Recent Labs     11/13/22  0452 11/14/22  0537 11/15/22  0511   BUN 23 21 22   CREATININE 1.10 1.10 1.10   WBC 16.1* 16.1* 14.8*     Estimated Creatinine Clearance: 59 mL/min (based on SCr of 1.1 mg/dL). No results found for: Michelet Hilario    MRSA Nasal Swab: N/A. Non-respiratory infection. .    Plan:  Dosing recommendations based on Bayesian software  Start vancomycin 1250 mg QD  Anticipated AUC of 457 and trough concentration of 13.3 at steady state  Renal labs as indicated   Vancomycin concentration ordered for 11/16 @ 0600    Pharmacy will continue to monitor patient and adjust therapy as indicated    Thank you for the consult,  Brigid Franco

## 2022-11-15 NOTE — PLAN OF CARE
Problem: Safety - Adult  Goal: Free from fall injury  Outcome: Progressing  Flowsheets (Taken 11/14/2022 2015 by Valentín Thompson RN)  Free From Fall Injury: Instruct family/caregiver on patient safety

## 2022-11-15 NOTE — PROGRESS NOTES
Plastic Surgery Consult    Today's Date: 11/15/2022   Admit Date: 11/12/2022    Impression:   Chest wall wound in the context of distant trauma and previously retained fb (no radio opaque fb on CXR)  Pathology pending  Positive cultures pseudomonas and staph aureus    Plan: Will coordinate surgery with Dr Xu Adamson, surgery to be performed at later date after current episode resolved and pathology results reviewed. Martha Gallardo for discharge from plastic surgery perspective with follow up at office prior to surgery. Discussed smoking and nicotine cessation at length (> 10 minutes) and specific implications for reconstructive options  Continue simple daily dressings with xeroform in the interim    Subjective:   Date of Consultation:  November 15, 2022  Referring Physician: Dr Xu Adamson    Patient is a 74 y.o. male with no prior medical history and minimal health care. Patient admitted with epistaxis and found to have MI. Patient notes a distant history of trauma with a cold chisel with retained metal body in his chest. Intermittent wound since he ultimately cut out a portion of the retained metal himself with his pocket knife. No biopsy or treatment prior to this hospitalization. No known previous skin cancers. No skin surveillance. Substantial sun exposure history. + tobacco and THC. Patient Active Problem List   Diagnosis    ST elevation myocardial infarction involving left anterior descending (LAD) coronary artery (HCC)    Tobacco abuse    Marijuana abuse    Epistaxis    Pseudomonas aeruginosa infection     No past medical history on file. No family history on file.    Social History     Tobacco Use    Smoking status: Every Day     Packs/day: 1.00     Types: Cigarettes    Smokeless tobacco: Never   Substance Use Topics    Alcohol use: Not on file     Past Surgical History:   Procedure Laterality Date    CARDIAC PROCEDURE N/A 11/12/2022    Coronary angiography performed by Oralia Melendrez MD at [address] [place] [address] CATH LAB CARDIAC PROCEDURE N/A 2022    Percutaneous coronary intervention performed by Caryn Pascal MD at [facility] LAB      Prior to Admission medications    Not on File       Allergies   Allergen Reactions    Diphenhydramine      Other reaction(s): Joint swelling-Intolerance    Codeine Rash        Review of Systems:  A comprehensive review of systems is negative except as stated in the HPI. Objective:     Visit Vitals  /78   Pulse 83   Temp 97.7 °F (36.5 °C) (Oral)   Resp 18   Ht 5' 9\" (1.753 m)   Wt 168 lb 14.4 oz (76.6 kg)   SpO2 98%   BMI 24.94 kg/m²     Temp (24hrs), Av °F (36.7 °C), Min:97.6 °F (36.4 °C), Max:98.6 °F (37 °C)         Physical Exam:    General:  Alert, cooperative, well noursished, well developed, appears stated age   Eyes:  Sclera anicteric. Pupils equally round and reactive to light. Mouth/Throat: Mucous membranes normal, oral pharynx clear   Neck: Supple   Lungs:   Clear to auscultation bilaterally, good effort   CV:  Regular rate and rhythm,no murmur, click, rub or gallop   Abdomen:   Soft, non-tender. bowel sounds normal. non-distended   Extremities: No cyanosis or edema   Skin: Large fungating wound to center of chest. No overtly purulent drainage. Surrounding skin irritated but not overtly cellulitic. No crepitance. No fluctuance.    Lymph nodes:  No LAD   Musculoskeletal: No swelling or deformity   Lines/Devices:  Intact, no erythema, drainage or tenderness   Psych: Alert and oriented, normal mood affect given the setting       Data Review:     CBC:  Recent Labs     22  0452 22  0537 11/15/22  0511   WBC 16.1* 16.1* 14.8*   HGB 12.3* 11.0* 10.8*   HCT 37.5* 34.5* 33.4*    286 316       BMP:  Recent Labs     22  0452 22  0537 11/15/22  0511   BUN 23     138 139   K 4.2 3.9 4.0   * 113* 111*   CO2 24        LFTS:  Recent Labs     22  2148   ALT 23       Microbiology:   Reviewed    Imaging: Reviewed    Signed By: Jasmyn Dunlap MD     November 15, 2022

## 2022-11-15 NOTE — PROGRESS NOTES
[facility] CARDIOLOGY PROGRESS NOTE           11/15/2022 10:21 AM    Admit Date: 11/12/2022         Subjective: WBC remains elevated, wound culture showed GNR. Doing well, biopsy yesterday by gen surg. ROS:  Cardiovascular:  As noted above    Objective:      Vitals:    11/14/22 2033 11/15/22 0017 11/15/22 0450 11/15/22 0843   BP: 118/67 123/69 108/65 127/76   Pulse: 77 75 78 78   Resp: 16 16 16 17   Temp: 98.3 °F (36.8 °C) 98.6 °F (37 °C) 97.7 °F (36.5 °C) 97.6 °F (36.4 °C)   TempSrc: Oral Oral Oral Oral   SpO2: 98% 95% 94% 95%   Weight:   168 lb 14.4 oz (76.6 kg)    Height:             Physical Exam:  General: Well Developed, Well Nourished, No Acute Distress, Alert & Oriented x 3, Appropriate mood  Neck: supple, no JVD  Heart: S1S2 with RRR without murmurs or gallops  Lungs: Clear throughout auscultation bilaterally without adventitious sounds  Abd: soft, nontender, nondistended, with good bowel sounds  Ext: no edema bilaterally  Skin: warm and dry      Data Review:   Recent Labs     11/12/22 2148 11/12/22 2148 11/13/22 0452 11/14/22  0537 11/15/22  0511     --  138 138 139   K 3.8  --  4.2 3.9 4.0   MG  --    < > 2.4 2.2 2.4   BUN 17  --  23 21 22   WBC 14.1*  --  16.1* 16.1* 14.8*   HGB 14.9  --  12.3* 11.0* 10.8*   HCT 44.3  --  37.5* 34.5* 33.4*     --  350 286 316   INR 1.0  --   --   --   --    CHOL  --   --  146  --   --    HDL  --   --  27*  --   --     < > = values in this interval not displayed. No results for input(s): TNIPOC in the last 72 hours. Invalid input(s): TROIQ        Assessment/Plan:     Principal Problem:    ST elevation myocardial infarction involving left anterior descending (LAD) coronary artery (HCC)  Active Problems:    Tobacco abuse    Marijuana abuse    Epistaxis  Resolved Problems:    * No resolved hospital problems.  *    A/P  1) STEMI - involving the LAD -continue dual antiplatelet therapy with aspirin and Plavix continue to atorvastatin 80 mg  2) Bleeding - seems resolved  3) Chest wall wound -biopsy complete. 4) HTN -well-controlled on Cozaar 25 mg daily metoprolol XL 25 mg daily  5) Elevated WBC/+ wound culture - will as hospitalitis to weigh in on need for abx.     Ifrah De Los Santos MD  11/15/2022 10:21 AM

## 2022-11-15 NOTE — PROGRESS NOTES
Bedside and Verbal shift change report received from [address]. Report included the following information Nurse Handoff Report, Intake/Output, MAR, and Recent Results.

## 2022-11-15 NOTE — PROGRESS NOTES
RRT Clinical Rounding Nurse Update    Vitals:    11/14/22 0756 11/14/22 1001 11/14/22 1310 11/14/22 1708   BP: 114/63 114/63 115/72 98/66   Pulse: 80 81 67 76   Resp: 16  16 16   Temp: 97.7 °F (36.5 °C)  97.5 °F (36.4 °C) 98.9 °F (37.2 °C)   TempSrc: Oral  Oral Oral   SpO2: 98%  98% 98%   Weight:       Height:            DETERIORATION INDEX SCORE: 23    ASSESSMENT:  Previous outreach assessment was reviewed. There have been no significant changes since previous assessment. PLAN:  Will discharge from RRT Clinical Rounding Program per protocol. Please call if needed.     Gracie Bower, [address]: [facility]: [phone]

## 2022-11-15 NOTE — PROGRESS NOTES
Bedside and Verbal shift change report to be given to Renate Jacobson RN (oncoming nurse) by self Carlitos Lies nurse). Report included the following information Nurse Handoff Report, Intake/Output, MAR, and Recent Results.

## 2022-11-15 NOTE — PROGRESS NOTES
GENERAL SURGERY PROGRESS NOTE    Name:  Alvena Reasons Date/Time of Admission: 2022 10:23 PM  CSN: [id number]  Attending Provider: Josey Willard MD  Room/Bed:  [facility] : 1950 72 y.o.      11/15/2022   POD#: 1 punch biopsy    SUBJECTIVE:    Patient seen and examined. Pt doing well, no acute overnight issues. Current Meds:  Scheduled Meds:   lidocaine  2 mL IntraDERmal Once    sodium chloride flush  5-40 mL IntraVENous 2 times per day    atorvastatin  80 mg Oral Nightly    nicotine  1 patch TransDERmal Daily    aspirin  81 mg Oral Daily    clopidogrel  75 mg Oral Daily    metoprolol succinate  25 mg Oral Daily    losartan  25 mg Oral Daily    sodium hypochlorite   Irrigation BID    cefTRIAXone (ROCEPHIN) IV  1,000 mg IntraVENous Q24H     Continuous Infusions:   sodium chloride       PRN Meds:.sodium chloride flush, sodium chloride, ondansetron **OR** ondansetron, acetaminophen **OR** acetaminophen, polyethylene glycol, potassium chloride **OR** potassium alternative oral replacement **OR** potassium chloride, magnesium sulfate, aluminum & magnesium hydroxide-simethicone, nitroGLYCERIN     OBJECTIVE:    Vital Signs:  Vitals:    11/15/22 0450   BP: 108/65   Pulse: 78   Resp: 16   Temp: 97.7 °F (36.5 °C)   SpO2: 94%        I/O:    I/O last 3 completed shifts: In: 480 [P.O.:480]  Out: 450 [Urine:450]   No intake/output data recorded.       PHYSICAL EXAMINATION  General  Alert, cooperative, NAD  Lungs Unlabored breathing  Heart RRR  Abdominal Soft, nontender, nondistended, no involuntary guarding or rebound appreciated   Extremities No edema or gross deformities noted appreciated   Skin 7.5x8 tumor with irregular borders    Labs:   Lab Results   Component Value Date    WBC 14.8 (H) 11/15/2022    HGB 10.8 (L) 11/15/2022    HCT 33.4 (L) 11/15/2022    MCV 97.4 11/15/2022     11/15/2022        Lab Results   Component Value Date/Time     11/15/2022 05:11 AM    K 4.0 11/15/2022 05:11 AM    CL 111 11/15/2022 05:11 AM    CO2 21 11/15/2022 05:11 AM    BUN 22 11/15/2022 05:11 AM    CREATININE 1.10 11/15/2022 05:11 AM    GLUCOSE 111 11/15/2022 05:11 AM    CALCIUM 8.6 11/15/2022 05:11 AM         Imaging Studies:     ASSESSMENT:    Principal Problem:    ST elevation myocardial infarction involving left anterior descending (LAD) coronary artery (HCC)  Active Problems:    Tobacco abuse    Marijuana abuse    Epistaxis  Resolved Problems:    * No resolved hospital problems.  *    -AFVSS      PLAN:    Awaiting pathology  Continue with local wound care  I have asked plastic surgery to look at the patient as he will likely need wide excision and this will require a flap or skin grafting to cover  All of this can be done as outpatient, I will see patient in my office once we have definitive answer of pathology    Electronically signed by:  Maricarmen Morris DO  11/15/2022

## 2022-11-15 NOTE — CARE COORDINATION
Patient discussed in [facility]. General surgery biopsied sternal wound culture. Culture + pseudomonas and staph. General surgery consulting Plastic surgery for wound closure with a flap. ID consulted for assistance with appropriate ABX therapy. CM will follow up after final cultures resulted and OP ABX recommendation from ID.

## 2022-11-15 NOTE — CONSULTS
Infectious Disease Consult    Today's Date: 11/15/2022   Admit Date: 11/12/2022    Impression:   Chest wall wound, present over past three years  Surgery performed punch biopsy   Wound cx with pseudomonas and staph aureus   ST elevation MI involving left anterior descending coronary artery s/p PCI and stenting of ostial/proximal LAD     Plan:   Cultures from chest wound obtained and grew pseudomonas and staph aureus  Blood cx have been ordered - follow results   General surgery performed punch biopsy due to concern for skin cancer   Continue vancomycin, stop rocephin   Start oral ciprofloxacin 750 mg BID   Follow final wound culture results and susceptibilities and will adjust antibiotics pending results    Anti-infectives:   Ceftriaxone 11/13 - 11/15  Vancomycin 11/15 -  Oral cipro 11/15 -    Subjective:   Date of Consultation:  November 15, 2022  Referring Physician: Hospitalist, Dr. Edgar Anderson     Patient is a 74 y.o. male with no prior medical history, he has sought no health care over his lifetime. He is a heavy tobacco user and reports occasional marijuana and alcohol use. He was admitted 11/13/2022 with acute epistaxis. He is not on blood thinners. Patient's nose was packed with Rhino Rocket's due to extensive bilateral nasal bleeding and he was given Afrin with resolution of nose bleed. He developed substernal chest pain, an EKG demonstrated anterior ST elevation MI. Patient taken to cath lab for emergent LHC. He is now s/p PCI and stenting of ostial/proximal LAD. In the ER, it was noted he had a chest wound. He states he had a foreign body there a long time ago which worked its way out a few years later. Since then he continues to have a chronic wound, it occasionally drains. General surgery was consulted to evaluate wound and a punch biopsy of lesion to r/o cancer was done. Plastic surgery consulted in regards to wound care and possible need of skin flap vs graft to cover wound.  Wound cx were obtained and grew pseudomonas and staph aures - ID consulted. 11/15: WBC 14.8. Afebrile. Patient stated in [address] he was chiseling and the chisel broke off and flew into his chest. He went to the ER and was told they removed all of the steel from his chest. Several years later he had a lump over that same area on his chest  and he used a knife to cut out more steel. The wound never completely healed. Three years ago he was spraying his yard with weed killer and says he got the weed killer in the wound. Since then the wound got bigger and started draining. One year ago he saw a doctor in [facility] and was given an antibiotic that stopped the draining. He says the wound does not hurt him but has never healed and he has not been back to the doctor to have it evaluated. Today, he denies any pain. He has no N/V/D. Prior to admission he was not having fevers or chills. Patient Active Problem List   Diagnosis    ST elevation myocardial infarction involving left anterior descending (LAD) coronary artery (HCC)    Tobacco abuse    Marijuana abuse    Epistaxis     No past medical history on file. No family history on file. Social History     Tobacco Use    Smoking status: Every Day     Packs/day: 1.00     Types: Cigarettes    Smokeless tobacco: Never   Substance Use Topics    Alcohol use: Not on file     Past Surgical History:   Procedure Laterality Date    CARDIAC PROCEDURE N/A 11/12/2022    Coronary angiography performed by Venkat Vogt MD at [address] [place] [address] CATH LAB    CARDIAC PROCEDURE N/A 11/12/2022    Percutaneous coronary intervention performed by Venkat Vogt MD at [address] [place] [address] LAB      Prior to Admission medications    Not on File       Allergies   Allergen Reactions    Diphenhydramine      Other reaction(s): Joint swelling-Intolerance    Codeine Rash        Review of Systems:  A comprehensive review of systems is negative except as stated in the HPI.       Objective:     Visit Vitals  /76   Pulse 78 Temp 97.6 °F (36.4 °C) (Oral)   Resp 17   Ht 5' 9\" (1.753 m)   Wt 168 lb 14.4 oz (76.6 kg)   SpO2 95%   BMI 24.94 kg/m²     Temp (24hrs), Av.1 °F (36.7 °C), Min:97.5 °F (36.4 °C), Max:98.9 °F (37.2 °C)       Lines:  peripheral IV    Physical Exam:    General:  Alert, cooperative, well noursished, well developed, appears stated age   Eyes:  Sclera anicteric. Pupils equally round and reactive to light. Mouth/Throat: Mucous membranes normal, oral pharynx clear   Neck: Supple   Lungs:   Clear to auscultation bilaterally, good effort   CV:  Regular rate and rhythm,no murmur, click, rub or gallop   Abdomen:   Soft, non-tender. bowel sounds normal. non-distended   Extremities: No cyanosis or edema   Skin: Large wound to center of chest, dressing in place that is clean, dry and intact. Dressing removed to visualize wound - area is large with an irregular surface and is oozing in multiple places.    Lymph nodes: Cervical and supraclavicular normal   Musculoskeletal: No swelling or deformity   Lines/Devices:  Intact, no erythema, drainage or tenderness   Psych: Alert and oriented, normal mood affect given the setting       Data Review:     CBC:  Recent Labs     22  0537 11/15/22  0511   WBC 16.1* 16.1* 14.8*   HGB 12.3* 11.0* 10.8*   HCT 37.5* 34.5* 33.4*    286 316       BMP:  Recent Labs     22  0537 11/15/22  0511   BUN 23 21 22    138 139   K 4.2 3.9 4.0   * 113* 111*   CO2 24  21       LFTS:  Recent Labs     22  2148   ALT 23       Microbiology:   Reviewed    Imaging:   Reviewed    Signed By: JAIRO Billingsley     November 15, 2022

## 2022-11-15 NOTE — CONSULTS
[facility] Hospitalist Consult   Admit Date:  2022 10:23 PM   Name:  Reshma Floyd   Age:  74 y.o. Sex:  male  :  1950   MRN:  [id number]   Room:  [facility]/    Presenting Complaint: Epistaxis    Reason(s) for Admission: Epistaxis [R04.0]  STEMI (ST elevation myocardial infarction) ([facility] [address] [I21.3]  ST elevation myocardial infarction (STEMI), unspecified artery ([address] [I21.3]  ACS (acute coronary syndrome) ([facility] [address] [I24.9]     Hospitalists consulted by Eloina Grant MD for: chest wound infection    History of Presenting Illness:   Reshma Floyd is a 74 y.o. male with PMHx of Tobacco abuse, Marijuana abuse, and a non-healing chest wound (2/2 foreign body removal and wound never healed) who was admitted initially presented to emergency room with acute epistaxis and develop substernal chest pain. EKG with anterior ST elevation. Admitted to cardiology service due to STEMI. S/p LHC and stenting. Patient has ongoing surgery was contacted given his nonhealing chest wound that has been going on for years. Patient is status post biopsy for concern for skin cancer. 11/15/22: Patient was seen and examined at bedside. Wife is at bedside  Denies any acute complaints at this time. Reports that he has been having this ongoing nonhealing chronic chest pain for quite some time. Denies fever or chills or chest pain. Review of Systems: 10 systems reviewed and negative except as noted in HPI. Assessment & Plan:   ST elevation myocardial infarction involving left anterior descending (LAD) coronary artery   S/p LHC with stenting  - Management as per Primary      Non-Healing Chest wall Wound  Pseudomonas Aeruginosa and Staph infection  - appreciate surgery's recommendation. Will need plastic surgery follow-up  - Await skin biopsy pathology  - Start empiric antibiotics - cipro and vancomycin  - ID consulted. - discussed with CM regarding potential need for IV Abx. Diet:  ADULT DIET;  Regular; 4 carb choices (60 gm/meal); Low Fat/Low Chol/High Fiber/2 gm Na; No Caffeine  DVT PPx: as per primary  Code status: Full Code    Principal Problem:    ST elevation myocardial infarction involving left anterior descending (LAD) coronary artery (HCC)  Active Problems:    Tobacco abuse    Marijuana abuse    Epistaxis  Resolved Problems:    * No resolved hospital problems. *      Past History:  Tobacco Abuse    Past Surgical History:   Procedure Laterality Date    CARDIAC PROCEDURE N/A 11/12/2022    Coronary angiography performed by Cesia Roberts MD at [address] [place] [address] CATH LAB    CARDIAC PROCEDURE N/A 11/12/2022    Percutaneous coronary intervention performed by Cesia Roberts MD at [address] [place] [address] CATH LAB        Social History     Tobacco Use    Smoking status: Every Day     Packs/day: 1.00     Types: Cigarettes    Smokeless tobacco: Never   Substance Use Topics    Alcohol use: Not on file      Social History     Substance and Sexual Activity   Drug Use Not on file     Past Family History  Pt cannot recall if he has family history of DM or HTN       There is no immunization history on file for this patient.   Allergies   Allergen Reactions    Diphenhydramine      Other reaction(s): Joint swelling-Intolerance    Codeine Rash      Current Facility-Administered Medications   Medication Dose Route Frequency    lidocaine 1 % injection 2 mL  2 mL IntraDERmal Once    sodium chloride flush 0.9 % injection 5-40 mL  5-40 mL IntraVENous 2 times per day    sodium chloride flush 0.9 % injection 5-40 mL  5-40 mL IntraVENous PRN    0.9 % sodium chloride infusion   IntraVENous PRN    ondansetron (ZOFRAN-ODT) disintegrating tablet 4 mg  4 mg Oral Q8H PRN    Or    ondansetron (ZOFRAN) injection 4 mg  4 mg IntraVENous Q6H PRN    acetaminophen (TYLENOL) tablet 650 mg  650 mg Oral Q6H PRN    Or    acetaminophen (TYLENOL) suppository 650 mg  650 mg Rectal Q6H PRN    polyethylene glycol (GLYCOLAX) packet 17 g  17 g Oral Daily PRN    atorvastatin (LIPITOR) tablet 80 mg  80 mg Oral Nightly    potassium chloride (KLOR-CON M) extended release tablet 40 mEq  40 mEq Oral PRN    Or    potassium bicarb-citric acid (EFFER-K) effervescent tablet 40 mEq  40 mEq Oral PRN    Or    potassium chloride 10 mEq/100 mL IVPB (Peripheral Line)  10 mEq IntraVENous PRN    magnesium sulfate 2000 mg in 50 mL IVPB premix  2,000 mg IntraVENous PRN    aluminum & magnesium hydroxide-simethicone (MAALOX) 200-200-20 MG/5ML suspension 30 mL  30 mL Oral Q6H PRN    nicotine (NICODERM CQ) 14 MG/24HR 1 patch  1 patch TransDERmal Daily    aspirin chewable tablet 81 mg  81 mg Oral Daily    clopidogrel (PLAVIX) tablet 75 mg  75 mg Oral Daily    metoprolol succinate (TOPROL XL) extended release tablet 25 mg  25 mg Oral Daily    losartan (COZAAR) tablet 25 mg  25 mg Oral Daily    nitroGLYCERIN (NITROSTAT) SL tablet 0.4 mg  0.4 mg SubLINGual Q5 Min PRN    sodium hypochlorite (DAKINS) 0.125 % external solution   Irrigation BID    cefTRIAXone (ROCEPHIN) 1,000 mg in sodium chloride 0.9 % 50 mL IVPB mini-bag  1,000 mg IntraVENous Q24H       Objective:   Patient Vitals for the past 24 hrs:   Temp Pulse Resp BP SpO2   11/15/22 0843 97.6 °F (36.4 °C) 78 17 127/76 95 %   11/15/22 0450 97.7 °F (36.5 °C) 78 16 108/65 94 %   11/15/22 0017 98.6 °F (37 °C) 75 16 123/69 95 %   11/14/22 2033 98.3 °F (36.8 °C) 77 16 118/67 98 %   11/14/22 1708 98.9 °F (37.2 °C) 76 16 98/66 98 %   11/14/22 1310 97.5 °F (36.4 °C) 67 16 115/72 98 %   11/14/22 1001 -- 81 -- 114/63 --       Oxygen Therapy  SpO2: 95 %  Pulse Oximetry Type: Continuous  Pulse via Oximetry: 68 beats per minute  SPO2 High Alarm Limit: 100  SPO2 Low Alarm Limit POX: 89  Pulse Oximeter Device Mode: Continuous  Pulse Oximeter Device Location: Right, Finger  O2 Device: None (Room air)  Oximetry Probe Site Changed: No  Skin Assessment: Clean, dry, & intact  Skin Protection for O2 Device: N/A  O2 Flow Rate (L/min): 2 L/min  Oxygen Therapy: Supplemental oxygen  O2 Delivery Method: Nasal cannula    Estimated body mass index is 24.94 kg/m² as calculated from the following:    Height as of this encounter: 5' 9\" (1.753 m). Weight as of this encounter: 168 lb 14.4 oz (76.6 kg). Intake/Output Summary (Last 24 hours) at 11/15/2022 0905  Last data filed at 11/14/2022 1645  Gross per 24 hour   Intake 360 ml   Output 450 ml   Net -90 ml         Physical Exam:    Blood pressure 127/76, pulse 78, temperature 97.6 °F (36.4 °C), temperature source Oral, resp. rate 17, height 5' 9\" (1.753 m), weight 168 lb 14.4 oz (76.6 kg), SpO2 95 %. General:    Well nourished. Head:  Normocephalic, atraumatic  Eyes:  Sclerae appear normal.  Pupils equally round. ENT:  Nares appear normal, no drainage. Moist oral mucosa  Neck:  No restricted ROM. Trachea midline   CV:   RRR. No m/r/g. No jugular venous distension. Chest wall covered in guaze. Lungs:   CTAB. No wheezing. Symmetric expansion. Abdomen: Bowel sounds present. Soft, nontender, nondistended. Extremities: No cyanosis or clubbing. No edema  Skin:     No rashes and normal coloration. Warm and dry. Neuro:  CN II-XII grossly intact. A&Ox3  Psych:  Normal mood and affect.       I have personally reviewed labs and tests showing:  Recent Labs:  Recent Results (from the past 24 hour(s))   Basic Metabolic Panel    Collection Time: 11/15/22  5:11 AM   Result Value Ref Range    Sodium 139 133 - 143 mmol/L    Potassium 4.0 3.5 - 5.1 mmol/L    Chloride 111 (H) 101 - 110 mmol/L    CO2 21 21 - 32 mmol/L    Anion Gap 7 2 - 11 mmol/L    Glucose 111 (H) 65 - 100 mg/dL    BUN 22 8 - 23 MG/DL    Creatinine 1.10 0.8 - 1.5 MG/DL    Est, Glom Filt Rate >60 >60 ml/min/1.73m2    Calcium 8.6 8.3 - 10.4 MG/DL   CBC    Collection Time: 11/15/22  5:11 AM   Result Value Ref Range    WBC 14.8 (H) 4.3 - 11.1 K/uL    RBC 3.43 (L) 4.23 - 5.6 M/uL    Hemoglobin 10.8 (L) 13.6 - 17.2 g/dL    Hematocrit 33.4 (L) 41.1 - 50.3 %    MCV 97.4 82 - 102 FL MCH 31.5 26.1 - 32.9 PG    MCHC 32.3 31.4 - 35.0 g/dL    RDW 13.3 11.9 - 14.6 %    Platelets 178 032 - 625 K/uL    MPV 10.4 9.4 - 12.3 FL    nRBC 0.00 0.0 - 0.2 K/uL   Magnesium    Collection Time: 11/15/22  5:11 AM   Result Value Ref Range    Magnesium 2.4 1.8 - 2.4 mg/dL       I have personally reviewed imaging studies showing:  Transthoracic echocardiogram (TTE) complete with contrast, bubble, strain, and 3D PRN    Result Date: 11/13/2022    Left Ventricle: Mildly reduced left ventricular systolic function. EF by 2D Simpsons Biplane is 42%. Left ventricle size is normal. Mildly increased wall thickness. Mid to distal anterior/anteroseptal wall hypokinesis. Abnormal diastolic function. Average E/e' ratio is 17.02.   Right Ventricle: Right ventricle size is normal. Normal systolic function. Aortic Valve: Tricuspid valve. Moderately calcified noncoronary cusp. Mitral Valve: Mildly thickened leaflet, at the anterior and posterior leaflets. Mild regurgitation. Technical qualifiers: Technically difficult study, color flow Doppler was performed and pulse wave and/or continuous wave Doppler was performed. Echocardiogram:  11/12/22    TRANSTHORACIC ECHOCARDIOGRAM (TTE) COMPLETE (CONTRAST/BUBBLE/3D PRN) 11/13/2022  7:33 PM (Final)    Interpretation Summary    Left Ventricle: Mildly reduced left ventricular systolic function. EF by 2D Simpsons Biplane is 42%. Left ventricle size is normal. Mildly increased wall thickness. Mid to distal anterior/anteroseptal wall hypokinesis. Abnormal diastolic function. Average E/e' ratio is 17.02.    Right Ventricle: Right ventricle size is normal. Normal systolic function. Aortic Valve: Tricuspid valve. Moderately calcified noncoronary cusp. Mitral Valve: Mildly thickened leaflet, at the anterior and posterior leaflets. Mild regurgitation.     Technical qualifiers: Technically difficult study, color flow Doppler was performed and pulse wave and/or continuous wave Doppler was performed. Signed by: Vincent Mendoza DO on 11/13/2022  7:33 PM        Signed:  Cy Pop MD    Part of this note may have been written by using a voice dictation software. The note has been proof read but may still contain some grammatical/other typographical errors.

## 2022-11-16 LAB
ABO + RH BLD: NORMAL
APTT PPP: 31.2 SEC (ref 24.5–34.2)
BASOPHILS # BLD: 0.1 K/UL (ref 0–0.2)
BASOPHILS NFR BLD: 0 % (ref 0–2)
BLOOD GROUP ANTIBODIES SERPL: NORMAL
DIFFERENTIAL METHOD BLD: ABNORMAL
EOSINOPHIL # BLD: 0.1 K/UL (ref 0–0.8)
EOSINOPHIL NFR BLD: 0 % (ref 0.5–7.8)
ERYTHROCYTE [DISTWIDTH] IN BLOOD BY AUTOMATED COUNT: 13.2 % (ref 11.9–14.6)
ERYTHROCYTE [DISTWIDTH] IN BLOOD BY AUTOMATED COUNT: 13.3 % (ref 11.9–14.6)
GLUCOSE BLD STRIP.AUTO-MCNC: 146 MG/DL (ref 65–100)
HCT VFR BLD AUTO: 31.9 % (ref 41.1–50.3)
HCT VFR BLD AUTO: 32.7 % (ref 41.1–50.3)
HGB BLD-MCNC: 10.7 G/DL (ref 13.6–17.2)
HGB BLD-MCNC: 10.8 G/DL (ref 13.6–17.2)
IMM GRANULOCYTES # BLD AUTO: 0.2 K/UL (ref 0–0.5)
IMM GRANULOCYTES NFR BLD AUTO: 1 % (ref 0–5)
INR PPP: 1
LYMPHOCYTES # BLD: 2.5 K/UL (ref 0.5–4.6)
LYMPHOCYTES NFR BLD: 10 % (ref 13–44)
MCH RBC QN AUTO: 32.2 PG (ref 26.1–32.9)
MCH RBC QN AUTO: 32.4 PG (ref 26.1–32.9)
MCHC RBC AUTO-ENTMCNC: 33 G/DL (ref 31.4–35)
MCHC RBC AUTO-ENTMCNC: 33.5 G/DL (ref 31.4–35)
MCV RBC AUTO: 96.1 FL (ref 82–102)
MCV RBC AUTO: 98.2 FL (ref 82–102)
MONOCYTES # BLD: 2.4 K/UL (ref 0.1–1.3)
MONOCYTES NFR BLD: 10 % (ref 4–12)
NEUTS SEG # BLD: 18.9 K/UL (ref 1.7–8.2)
NEUTS SEG NFR BLD: 78 % (ref 43–78)
NRBC # BLD: 0 K/UL (ref 0–0.2)
NRBC # BLD: 0 K/UL (ref 0–0.2)
PLATELET # BLD AUTO: 364 K/UL (ref 150–450)
PLATELET # BLD AUTO: 428 K/UL (ref 150–450)
PMV BLD AUTO: 10.2 FL (ref 9.4–12.3)
PMV BLD AUTO: 10.3 FL (ref 9.4–12.3)
PROTHROMBIN TIME: 13.9 SEC (ref 12.6–14.3)
RBC # BLD AUTO: 3.32 M/UL (ref 4.23–5.6)
RBC # BLD AUTO: 3.33 M/UL (ref 4.23–5.6)
SERVICE CMNT-IMP: ABNORMAL
SPECIMEN EXP DATE BLD: NORMAL
VANCOMYCIN SERPL-MCNC: 6.7 UG/ML
WBC # BLD AUTO: 18.2 K/UL (ref 4.3–11.1)
WBC # BLD AUTO: 24.1 K/UL (ref 4.3–11.1)

## 2022-11-16 PROCEDURE — 6360000002 HC RX W HCPCS: Performed by: INTERNAL MEDICINE

## 2022-11-16 PROCEDURE — 2500000003 HC RX 250 WO HCPCS: Performed by: NURSE PRACTITIONER

## 2022-11-16 PROCEDURE — 6360000002 HC RX W HCPCS: Performed by: NURSE PRACTITIONER

## 2022-11-16 PROCEDURE — 99232 SBSQ HOSP IP/OBS MODERATE 35: CPT | Performed by: INTERNAL MEDICINE

## 2022-11-16 PROCEDURE — 6370000000 HC RX 637 (ALT 250 FOR IP)

## 2022-11-16 PROCEDURE — 6370000000 HC RX 637 (ALT 250 FOR IP): Performed by: NURSE PRACTITIONER

## 2022-11-16 PROCEDURE — 6370000000 HC RX 637 (ALT 250 FOR IP): Performed by: INTERNAL MEDICINE

## 2022-11-16 PROCEDURE — 2580000003 HC RX 258: Performed by: INTERNAL MEDICINE

## 2022-11-16 PROCEDURE — 82962 GLUCOSE BLOOD TEST: CPT

## 2022-11-16 PROCEDURE — 86900 BLOOD TYPING SEROLOGIC ABO: CPT

## 2022-11-16 PROCEDURE — 99233 SBSQ HOSP IP/OBS HIGH 50: CPT | Performed by: STUDENT IN AN ORGANIZED HEALTH CARE EDUCATION/TRAINING PROGRAM

## 2022-11-16 PROCEDURE — 85025 COMPLETE CBC W/AUTO DIFF WBC: CPT

## 2022-11-16 PROCEDURE — 36415 COLL VENOUS BLD VENIPUNCTURE: CPT

## 2022-11-16 PROCEDURE — 1100000003 HC PRIVATE W/ TELEMETRY

## 2022-11-16 PROCEDURE — 99222 1ST HOSP IP/OBS MODERATE 55: CPT | Performed by: STUDENT IN AN ORGANIZED HEALTH CARE EDUCATION/TRAINING PROGRAM

## 2022-11-16 PROCEDURE — 2500000003 HC RX 250 WO HCPCS: Performed by: FAMILY MEDICINE

## 2022-11-16 PROCEDURE — 86022 PLATELET ANTIBODIES: CPT

## 2022-11-16 PROCEDURE — 99233 SBSQ HOSP IP/OBS HIGH 50: CPT | Performed by: INTERNAL MEDICINE

## 2022-11-16 PROCEDURE — 85610 PROTHROMBIN TIME: CPT

## 2022-11-16 PROCEDURE — 80202 ASSAY OF VANCOMYCIN: CPT

## 2022-11-16 PROCEDURE — 30903 CONTROL OF NOSEBLEED: CPT | Performed by: STUDENT IN AN ORGANIZED HEALTH CARE EDUCATION/TRAINING PROGRAM

## 2022-11-16 PROCEDURE — 6360000002 HC RX W HCPCS: Performed by: STUDENT IN AN ORGANIZED HEALTH CARE EDUCATION/TRAINING PROGRAM

## 2022-11-16 PROCEDURE — 85027 COMPLETE CBC AUTOMATED: CPT

## 2022-11-16 PROCEDURE — 85730 THROMBOPLASTIN TIME PARTIAL: CPT

## 2022-11-16 PROCEDURE — 6360000002 HC RX W HCPCS: Performed by: FAMILY MEDICINE

## 2022-11-16 RX ORDER — METOPROLOL TARTRATE 5 MG/5ML
5 INJECTION INTRAVENOUS ONCE
Status: COMPLETED | OUTPATIENT
Start: 2022-11-16 | End: 2022-11-16

## 2022-11-16 RX ORDER — MORPHINE SULFATE 2 MG/ML
2 INJECTION, SOLUTION INTRAMUSCULAR; INTRAVENOUS
Status: DISCONTINUED | OUTPATIENT
Start: 2022-11-16 | End: 2022-11-18 | Stop reason: HOSPADM

## 2022-11-16 RX ORDER — ONDANSETRON 2 MG/ML
INJECTION INTRAMUSCULAR; INTRAVENOUS
Status: COMPLETED | OUTPATIENT
Start: 2022-11-16 | End: 2022-11-16

## 2022-11-16 RX ORDER — ETOMIDATE 2 MG/ML
INJECTION INTRAVENOUS
Status: DISCONTINUED
Start: 2022-11-16 | End: 2022-11-16 | Stop reason: WASHOUT

## 2022-11-16 RX ORDER — OXYMETAZOLINE HYDROCHLORIDE 0.05 G/100ML
2 SPRAY NASAL 2 TIMES DAILY
Status: COMPLETED | OUTPATIENT
Start: 2022-11-16 | End: 2022-11-16

## 2022-11-16 RX ORDER — TRANEXAMIC ACID 100 MG/ML
1000 INJECTION, SOLUTION INTRAVENOUS ONCE
Status: COMPLETED | OUTPATIENT
Start: 2022-11-16 | End: 2022-11-16

## 2022-11-16 RX ORDER — SUCCINYLCHOLINE/SOD CL,ISO/PF 200MG/10ML
SYRINGE (ML) INTRAVENOUS
Status: DISCONTINUED
Start: 2022-11-16 | End: 2022-11-16 | Stop reason: WASHOUT

## 2022-11-16 RX ORDER — MORPHINE SULFATE 2 MG/ML
1 INJECTION, SOLUTION INTRAMUSCULAR; INTRAVENOUS ONCE
Status: DISCONTINUED | OUTPATIENT
Start: 2022-11-16 | End: 2022-11-16

## 2022-11-16 RX ORDER — HYDRALAZINE HYDROCHLORIDE 20 MG/ML
20 INJECTION INTRAMUSCULAR; INTRAVENOUS EVERY 6 HOURS PRN
Status: DISCONTINUED | OUTPATIENT
Start: 2022-11-16 | End: 2022-11-16

## 2022-11-16 RX ORDER — HYDRALAZINE HYDROCHLORIDE 20 MG/ML
20 INJECTION INTRAMUSCULAR; INTRAVENOUS ONCE
Status: DISCONTINUED | OUTPATIENT
Start: 2022-11-16 | End: 2022-11-18 | Stop reason: HOSPADM

## 2022-11-16 RX ORDER — HYDRALAZINE HYDROCHLORIDE 20 MG/ML
INJECTION INTRAMUSCULAR; INTRAVENOUS
Status: DISPENSED
Start: 2022-11-16 | End: 2022-11-17

## 2022-11-16 RX ORDER — PROCHLORPERAZINE EDISYLATE 5 MG/ML
10 INJECTION INTRAMUSCULAR; INTRAVENOUS EVERY 6 HOURS PRN
Status: DISCONTINUED | OUTPATIENT
Start: 2022-11-16 | End: 2022-11-18 | Stop reason: HOSPADM

## 2022-11-16 RX ORDER — ONDANSETRON 2 MG/ML
4 INJECTION INTRAMUSCULAR; INTRAVENOUS ONCE
Status: COMPLETED | OUTPATIENT
Start: 2022-11-16 | End: 2022-11-16

## 2022-11-16 RX ORDER — TRAMADOL HYDROCHLORIDE 50 MG/1
50 TABLET ORAL EVERY 6 HOURS PRN
Status: DISCONTINUED | OUTPATIENT
Start: 2022-11-16 | End: 2022-11-18 | Stop reason: HOSPADM

## 2022-11-16 RX ORDER — MIDAZOLAM HYDROCHLORIDE 1 MG/ML
INJECTION INTRAMUSCULAR; INTRAVENOUS
Status: DISCONTINUED
Start: 2022-11-16 | End: 2022-11-16 | Stop reason: WASHOUT

## 2022-11-16 RX ORDER — HYDRALAZINE HYDROCHLORIDE 20 MG/ML
INJECTION INTRAMUSCULAR; INTRAVENOUS
Status: COMPLETED | OUTPATIENT
Start: 2022-11-16 | End: 2022-11-16

## 2022-11-16 RX ORDER — OXYMETAZOLINE HYDROCHLORIDE 0.05 G/100ML
2 SPRAY NASAL 2 TIMES DAILY PRN
Status: DISPENSED | OUTPATIENT
Start: 2022-11-16 | End: 2022-11-17

## 2022-11-16 RX ORDER — MORPHINE SULFATE 2 MG/ML
INJECTION, SOLUTION INTRAMUSCULAR; INTRAVENOUS
Status: COMPLETED | OUTPATIENT
Start: 2022-11-16 | End: 2022-11-16

## 2022-11-16 RX ORDER — MORPHINE SULFATE 2 MG/ML
2 INJECTION, SOLUTION INTRAMUSCULAR; INTRAVENOUS ONCE
Status: COMPLETED | OUTPATIENT
Start: 2022-11-16 | End: 2022-11-16

## 2022-11-16 RX ORDER — PROPOFOL 10 MG/ML
INJECTION, EMULSION INTRAVENOUS
Status: DISCONTINUED
Start: 2022-11-16 | End: 2022-11-16 | Stop reason: WASHOUT

## 2022-11-16 RX ORDER — FENTANYL CITRATE 50 UG/ML
INJECTION, SOLUTION INTRAMUSCULAR; INTRAVENOUS
Status: DISCONTINUED
Start: 2022-11-16 | End: 2022-11-16 | Stop reason: WASHOUT

## 2022-11-16 RX ORDER — HYDRALAZINE HYDROCHLORIDE 20 MG/ML
20 INJECTION INTRAMUSCULAR; INTRAVENOUS EVERY 4 HOURS PRN
Status: DISCONTINUED | OUTPATIENT
Start: 2022-11-16 | End: 2022-11-18 | Stop reason: HOSPADM

## 2022-11-16 RX ORDER — METOPROLOL TARTRATE 5 MG/5ML
INJECTION INTRAVENOUS
Status: COMPLETED | OUTPATIENT
Start: 2022-11-16 | End: 2022-11-16

## 2022-11-16 RX ADMIN — HYDRALAZINE HYDROCHLORIDE 20 MG: 20 INJECTION INTRAMUSCULAR; INTRAVENOUS at 14:30

## 2022-11-16 RX ADMIN — ACETAMINOPHEN 650 MG: 325 TABLET ORAL at 01:53

## 2022-11-16 RX ADMIN — HYDRALAZINE HYDROCHLORIDE 20 MG: 20 INJECTION INTRAMUSCULAR; INTRAVENOUS at 17:50

## 2022-11-16 RX ADMIN — CIPROFLOXACIN 750 MG: 500 TABLET, FILM COATED ORAL at 09:01

## 2022-11-16 RX ADMIN — Medication: at 09:02

## 2022-11-16 RX ADMIN — ONDANSETRON 4 MG: 2 INJECTION INTRAMUSCULAR; INTRAVENOUS at 01:51

## 2022-11-16 RX ADMIN — ONDANSETRON 4 MG: 2 INJECTION INTRAMUSCULAR; INTRAVENOUS at 15:02

## 2022-11-16 RX ADMIN — METOPROLOL TARTRATE 5 MG: 1 INJECTION, SOLUTION INTRAVENOUS at 14:43

## 2022-11-16 RX ADMIN — TRANEXAMIC ACID 1000 MG: 1 INJECTION, SOLUTION INTRAVENOUS at 02:03

## 2022-11-16 RX ADMIN — OXYMETAZOLINE HCL 2 SPRAY: 0.05 SPRAY NASAL at 02:03

## 2022-11-16 RX ADMIN — MORPHINE SULFATE 2 MG: 2 INJECTION, SOLUTION INTRAMUSCULAR; INTRAVENOUS at 14:59

## 2022-11-16 RX ADMIN — MORPHINE SULFATE 2 MG: 2 INJECTION, SOLUTION INTRAMUSCULAR; INTRAVENOUS at 14:53

## 2022-11-16 RX ADMIN — ONDANSETRON 4 MG: 2 INJECTION INTRAMUSCULAR; INTRAVENOUS at 14:48

## 2022-11-16 RX ADMIN — SODIUM CHLORIDE, PRESERVATIVE FREE 10 ML: 5 INJECTION INTRAVENOUS at 21:45

## 2022-11-16 RX ADMIN — CLOPIDOGREL BISULFATE 75 MG: 75 TABLET ORAL at 08:59

## 2022-11-16 RX ADMIN — METOPROLOL SUCCINATE 25 MG: 25 TABLET, FILM COATED, EXTENDED RELEASE ORAL at 08:59

## 2022-11-16 RX ADMIN — ASPIRIN 81 MG 81 MG: 81 TABLET ORAL at 08:59

## 2022-11-16 RX ADMIN — CIPROFLOXACIN 750 MG: 500 TABLET, FILM COATED ORAL at 21:44

## 2022-11-16 RX ADMIN — METOPROLOL TARTRATE 5 MG: 5 INJECTION INTRAVENOUS at 15:03

## 2022-11-16 RX ADMIN — SODIUM CHLORIDE, PRESERVATIVE FREE 5 ML: 5 INJECTION INTRAVENOUS at 09:00

## 2022-11-16 RX ADMIN — VANCOMYCIN HYDROCHLORIDE 1250 MG: 10 INJECTION, POWDER, LYOPHILIZED, FOR SOLUTION INTRAVENOUS at 11:16

## 2022-11-16 RX ADMIN — LOSARTAN POTASSIUM 25 MG: 25 TABLET, FILM COATED ORAL at 08:59

## 2022-11-16 RX ADMIN — ATORVASTATIN CALCIUM 80 MG: 80 TABLET, FILM COATED ORAL at 21:45

## 2022-11-16 ASSESSMENT — PAIN SCALES - GENERAL
PAINLEVEL_OUTOF10: 0

## 2022-11-16 ASSESSMENT — ENCOUNTER SYMPTOMS
CHOKING: 0
EYE DISCHARGE: 0
COLOR CHANGE: 0
SINUS PAIN: 0
EYE REDNESS: 0
EYE PAIN: 0
STRIDOR: 0
ABDOMINAL DISTENTION: 0
COUGH: 0
CONSTIPATION: 0
ABDOMINAL PAIN: 0

## 2022-11-16 NOTE — PROGRESS NOTES
Hospitalist Progress Note   Admit Date:  2022 10:23 PM   Name:  Estefania Parmar   Age:  74 y.o. Sex:  male  :  1950   MRN:  [id number]   Room:  [facility]/01    Presenting Complaint: Epistaxis     Reason(s) for Admission: Epistaxis [R04.0]  STEMI (ST elevation myocardial infarction) ([facility] [address] [I21.3]  ST elevation myocardial infarction (STEMI), unspecified artery ([facility] [address] [I21.3]  ACS (acute coronary syndrome) [facility]) [I24.9]     Hospital Course:   74 y.o. male with PMHx of Tobacco abuse, Marijuana abuse, and a non-healing chest wound (2/2 foreign body removal and wound never healed) who was admitted initially presented to emergency room with acute epistaxis and develop substernal chest pain. EKG with anterior ST elevation. Admitted to cardiology service due to STEMI. S/p LHC and stenting. Patient has ongoing chest wound. Plastic surgery was contacted given his nonhealing chest wound that has been going on for years. Plastic surgery has seen on 11/15, will coordinate with general surgery for surgery to be performed at a later date after current episode is resolved. Patient is status post biopsy for concern for skin cancer. Pathology came back positive for basal cell carcinoma. Oncology will be consulted. Wound culture grew Pseudomonas and Staph. ID was consulted. Pt started on Vanc/Cipro. Subjective & 24hr Events (22): Pt alert and oriented x3 this AM. Had recurrent epistaxis last night and Rhino rockets placed. ENT consulted this AM.     He denies fever, chills, chest pain, SOB, abdominal pain, nausea, vomiting.     Assessment & Plan:       STEMI  S/p LHC on  with REBA placement  Cardiology primary  Cont DAPT  Cont ARB, BB    Chest wall wound  Pseudomonas aeruginosa infection  Chest wound cultures: Pseudomonas and Staph  BCX: pending  Abx: Vanc/Cipro (11/15-..)  Wound team on board  ID on board, appreciate recs    Basal cell carcinoma  Skin biopsy with surgery on --basal cell carcinoma  Plastic surgery has seen on 11/15, will coordinate with general surgery for surgery to be performed at a later date after current episode is resolved. Needs smoking cessation for reconstructive options  Cont daily dressings with Xeroform  Consult Oncology, appreciate recs     Tobacco abuse  Marijuana abuse  Cessation discussed  Nicotine patch prn    Epistaxis  Unable to hold DAPT d/t recent STEMI  Rhino rockets in place  ENT consulted, appreciate recs            Anticipated discharge needs:      Per primary    I spent 36 minutes of time caring for this patient on the unit nearby or at bedside, and more than 50 percent was spent on coordination of care activities, and/or patient/family counseling regarding status and plan of care. Diet:  ADULT DIET; Regular; 4 carb choices (60 gm/meal); Low Fat/Low Chol/High Fiber/2 gm Na; No Caffeine  DVT PPx: SCD's  Code status: Full Code    Hospital Problems:  Principal Problem:    ST elevation myocardial infarction involving left anterior descending (LAD) coronary artery (HCC)  Active Problems:    Tobacco abuse    Marijuana abuse    Epistaxis    Pseudomonas aeruginosa infection  Resolved Problems:    * No resolved hospital problems.  *      Objective:   Patient Vitals for the past 24 hrs:   Temp Pulse Resp BP SpO2   11/16/22 0859 -- 86 -- (!) 167/94 --   11/16/22 0828 97.7 °F (36.5 °C) 86 17 (!) 167/94 98 %   11/16/22 0400 97.5 °F (36.4 °C) 71 20 (!) 161/87 96 %   11/16/22 0157 -- 86 -- (!) 162/87 --   11/16/22 0145 -- 85 18 (!) 182/84 --   11/16/22 0051 97.8 °F (36.6 °C) 73 18 132/83 95 %   11/15/22 2040 98.2 °F (36.8 °C) 66 16 (!) 105/94 96 %   11/15/22 1706 97.7 °F (36.5 °C) 83 18 134/78 98 %   11/15/22 1248 97.9 °F (36.6 °C) 75 18 (!) 96/55 98 %       Oxygen Therapy  SpO2: 98 %  Pulse Oximetry Type: Continuous  Pulse via Oximetry: 68 beats per minute  SPO2 High Alarm Limit: 100  SPO2 Low Alarm Limit POX: 89  Pulse Oximeter Device Mode: Continuous  Pulse Oximeter Device Location: Right, Finger  O2 Device: None (Room air)  Oximetry Probe Site Changed: No  Skin Assessment: Clean, dry, & intact  Skin Protection for O2 Device: N/A  O2 Flow Rate (L/min): 2 L/min  Oxygen Therapy: Supplemental oxygen  O2 Delivery Method: Nasal cannula    Estimated body mass index is 25.5 kg/m² as calculated from the following:    Height as of this encounter: 5' 9\" (1.753 m). Weight as of this encounter: 172 lb 11.2 oz (78.3 kg). Intake/Output Summary (Last 24 hours) at 11/16/2022 1111  Last data filed at 11/15/2022 2040  Gross per 24 hour   Intake 300 ml   Output 420 ml   Net -120 ml         Physical Exam:     Blood pressure (!) 167/94, pulse 86, temperature 97.7 °F (36.5 °C), temperature source Oral, resp. rate 17, height 5' 9\" (1.753 m), weight 172 lb 11.2 oz (78.3 kg), SpO2 98 %. General:    Well nourished. Head:  Normocephalic, atraumatic  Eyes:  Sclerae appear normal.  Pupils equally round. ENT:  Dried blood noted in both nares with Rhino Rocket and packing in place. Neck:  No restricted ROM. Trachea midline   CV:   RRR. No m/r/g. No jugular venous distension. Lungs:   CTAB. No wheezing, rhonchi, or rales. Symmetric expansion. Abdomen: Bowel sounds present. Soft, nontender, nondistended. Extremities: No cyanosis or clubbing. No edema  Skin:     Chest wall wound with pic below (11/12/22). Dressing in place C/D/I  Neuro:  CN II-XII grossly intact. Sensation intact. A&Ox3  Psych:  Normal mood and affect.             I have personally reviewed labs and tests showing:  Recent Labs:  Recent Results (from the past 48 hour(s))   Basic Metabolic Panel    Collection Time: 11/15/22  5:11 AM   Result Value Ref Range    Sodium 139 133 - 143 mmol/L    Potassium 4.0 3.5 - 5.1 mmol/L    Chloride 111 (H) 101 - 110 mmol/L    CO2 21 21 - 32 mmol/L    Anion Gap 7 2 - 11 mmol/L    Glucose 111 (H) 65 - 100 mg/dL    BUN 22 8 - 23 MG/DL    Creatinine 1.10 0.8 - 1.5 MG/DL    Est, Glom Filt Rate >60 >60 ml/min/1.73m2    Calcium 8.6 8.3 - 10.4 MG/DL   CBC    Collection Time: 11/15/22  5:11 AM   Result Value Ref Range    WBC 14.8 (H) 4.3 - 11.1 K/uL    RBC 3.43 (L) 4.23 - 5.6 M/uL    Hemoglobin 10.8 (L) 13.6 - 17.2 g/dL    Hematocrit 33.4 (L) 41.1 - 50.3 %    MCV 97.4 82 - 102 FL    MCH 31.5 26.1 - 32.9 PG    MCHC 32.3 31.4 - 35.0 g/dL    RDW 13.3 11.9 - 14.6 %    Platelets 219 249 - 507 K/uL    MPV 10.4 9.4 - 12.3 FL    nRBC 0.00 0.0 - 0.2 K/uL   Magnesium    Collection Time: 11/15/22  5:11 AM   Result Value Ref Range    Magnesium 2.4 1.8 - 2.4 mg/dL   Culture, Blood 1    Collection Time: 11/15/22 10:01 AM    Specimen: Blood   Result Value Ref Range    Special Requests RIGHT  Antecubital        Culture NO GROWTH AFTER 20 HOURS     Culture, Blood 1    Collection Time: 11/15/22 10:01 AM    Specimen: Blood   Result Value Ref Range    Special Requests LEFT  Antecubital        Culture NO GROWTH AFTER 20 HOURS     Vancomycin Level, Random    Collection Time: 11/16/22  6:27 AM   Result Value Ref Range    Vancomycin Rm 6.7 UG/ML   CBC    Collection Time: 11/16/22  6:27 AM   Result Value Ref Range    WBC 18.2 (H) 4.3 - 11.1 K/uL    RBC 3.33 (L) 4.23 - 5.6 M/uL    Hemoglobin 10.8 (L) 13.6 - 17.2 g/dL    Hematocrit 32.7 (L) 41.1 - 50.3 %    MCV 98.2 82 - 102 FL    MCH 32.4 26.1 - 32.9 PG    MCHC 33.0 31.4 - 35.0 g/dL    RDW 13.3 11.9 - 14.6 %    Platelets 778 385 - 536 K/uL    MPV 10.2 9.4 - 12.3 FL    nRBC 0.00 0.0 - 0.2 K/uL       I have personally reviewed imaging studies showing: Other Studies:  XR CHEST PORTABLE   Final Result   1. Worsening central interstitial infiltrates. The appearance is most   suggestive of evolving pulmonary edema although additional etiologies can have   this appearance. Recommend clinical correlation. XR CHEST PORTABLE   Final Result   Mild pulmonary vascular congestion with edema.              Current Meds:  Current Facility-Administered Medications   Medication Dose Route Frequency    morphine injection 2 mg  2 mg IntraVENous Q3H PRN    vancomycin (VANCOCIN) 1250 mg in sodium chloride 0.9% 250 mL IVPB  1,250 mg IntraVENous Q24H    ciprofloxacin (CIPRO) tablet 750 mg  750 mg Oral 2 times per day    lidocaine 1 % injection 2 mL  2 mL IntraDERmal Once    sodium chloride flush 0.9 % injection 5-40 mL  5-40 mL IntraVENous 2 times per day    sodium chloride flush 0.9 % injection 5-40 mL  5-40 mL IntraVENous PRN    0.9 % sodium chloride infusion   IntraVENous PRN    ondansetron (ZOFRAN-ODT) disintegrating tablet 4 mg  4 mg Oral Q8H PRN    Or    ondansetron (ZOFRAN) injection 4 mg  4 mg IntraVENous Q6H PRN    acetaminophen (TYLENOL) tablet 650 mg  650 mg Oral Q6H PRN    Or    acetaminophen (TYLENOL) suppository 650 mg  650 mg Rectal Q6H PRN    polyethylene glycol (GLYCOLAX) packet 17 g  17 g Oral Daily PRN    atorvastatin (LIPITOR) tablet 80 mg  80 mg Oral Nightly    potassium chloride (KLOR-CON M) extended release tablet 40 mEq  40 mEq Oral PRN    Or    potassium bicarb-citric acid (EFFER-K) effervescent tablet 40 mEq  40 mEq Oral PRN    Or    potassium chloride 10 mEq/100 mL IVPB (Peripheral Line)  10 mEq IntraVENous PRN    magnesium sulfate 2000 mg in 50 mL IVPB premix  2,000 mg IntraVENous PRN    aluminum & magnesium hydroxide-simethicone (MAALOX) 200-200-20 MG/5ML suspension 30 mL  30 mL Oral Q6H PRN    nicotine (NICODERM CQ) 14 MG/24HR 1 patch  1 patch TransDERmal Daily    aspirin chewable tablet 81 mg  81 mg Oral Daily    clopidogrel (PLAVIX) tablet 75 mg  75 mg Oral Daily    metoprolol succinate (TOPROL XL) extended release tablet 25 mg  25 mg Oral Daily    losartan (COZAAR) tablet 25 mg  25 mg Oral Daily    nitroGLYCERIN (NITROSTAT) SL tablet 0.4 mg  0.4 mg SubLINGual Q5 Min PRN    sodium hypochlorite (DAKINS) 0.125 % external solution   Irrigation BID       Signed: Hortencia Bernal DO    Part of this note may have been written by using a voice dictation software.   The note has been proof read but may still contain some grammatical/other typographical errors.

## 2022-11-16 NOTE — PROGRESS NOTES
RRT Clinical Rounding Nurse Update    Vitals:    11/16/22 1457 11/16/22 1502 11/16/22 1618 11/16/22 1750   BP: 131/60 (!) 121/56 137/72 (!) 149/71   Pulse: 88 87 80    Resp:   19    Temp:   97.6 °F (36.4 °C)    TempSrc:   Oral    SpO2:   97%    Weight:       Height:            DETERIORATION INDEX SCORE: 30    ASSESSMENT:  Previous outreach assessment was reviewed. There have been no significant changes since previous assessment. ENT saw patient and placed 2 rhino rockets. No bloody drainage at this time. PLAN:  Will follow per RRT Clinical Rounding Program protocol.     Brooklyn Kahn RN  [address] [facility]: [facility]: [phone]

## 2022-11-16 NOTE — PROGRESS NOTES
VANCO DAILY FOLLOW UP NOTE  [address] [facility] Pharmacokinetic Monitoring Service - Vancomycin    Consulting Provider: Dr. Rufus Ellsworth   Indication: SSTI  Target Concentration: Goal AUC/LANDRY 400-600 mg*hr/L  Day of Therapy: 2  Additional Antimicrobials: Ciprofloxacin     Patient eligible for piperacillin-tazobactam to cefepime auto-substitution per P&T approved protocol? N/A    Pertinent Laboratory Values: Wt Readings from Last 1 Encounters:   11/16/22 172 lb 11.2 oz (78.3 kg)     Temp Readings from Last 1 Encounters:   11/16/22 97.7 °F (36.5 °C) (Oral)     Recent Labs     11/14/22  0537 11/15/22  0511 11/16/22  0627   BUN 21 22  --    CREATININE 1.10 1.10  --    WBC 16.1* 14.8* 18.2*     Estimated Creatinine Clearance: 59 mL/min (based on SCr of 1.1 mg/dL). Lab Results   Component Value Date/Time    VANCORANDOM 6.7 11/16/2022 06:27 AM       MRSA Nasal Swab: N/A. Non-respiratory infection.       Assessment:  Date/Time Dose Concentration AUC   11/16/2022  06:27 1750 6.7 448   Note: Serum concentrations collected for AUC dosing may appear elevated if collected in close proximity to the dose administered, this is not necessarily an indication of toxicity    Plan:  Dosing recommendations based on Bayesian software  Continue vancomycin 1250 mg QD  Anticipated AUC of 448 and trough concentration of 13.2 at steady state  Renal labs as indicated   Vancomycin concentrations will be ordered as clinically appropriate   Pharmacy will continue to monitor patient and adjust therapy as indicated    Thank you for the consult,  Dara Damian

## 2022-11-16 NOTE — PROGRESS NOTES
Rapid Response Team Note      Subjective: Responded to Rapid Response secondary to rapid uncontrolled epistaxis bleed and hypertension. Summary: Patient seen with large bloody output from nose, mouth, and left eye. BP elevated at this time at 191/89 (123). Per primary RN 2 rhino rockets are in place. External pressure applied to patients nose for one hour. Patient placed in tripod position to promote drainage out of his throat. Multiple PRN BP medications administered. Patient received IV zofran to assist with nausea. Cardiology NP, Hospitalist MD, and Pulmonary MD at bedside to assess. ENT called to assess patient. See Rapid Response/Code Blue Narrator for full documentation    Outcome: Patient to remain in current location. Will follow-up per Critical Care Clinical Rounding protocol.     Julio Knutson RN  [address] [facility]: [facility]: [phone]

## 2022-11-16 NOTE — PROGRESS NOTES
Spiritual Care Visit, initial visit.  responded to Rapid Response. Visit by Brady Matson, Staff .  Anup., Oh.B., B.A.

## 2022-11-16 NOTE — PROGRESS NOTES
GENERAL SURGERY PROGRESS NOTE    Name:  Shanae Pritchett Date/Time of Admission: 2022 10:23 PM  CSN: [id number]  Attending Provider: Fallon Tomas MD  Room/Bed:  [facility] : 1950 72 y.o.      2022       SUBJECTIVE:    Patient seen and examined. Patient had episode of significant epistaxis yesterday     Current Meds:  Scheduled Meds:   hydrALAZINE  20 mg IntraVENous Once    hydrALAZINE        silver nitrate applicators  1 each Topical Once    vancomycin  1,250 mg IntraVENous Q24H    ciprofloxacin  750 mg Oral 2 times per day    lidocaine  2 mL IntraDERmal Once    sodium chloride flush  5-40 mL IntraVENous 2 times per day    atorvastatin  80 mg Oral Nightly    nicotine  1 patch TransDERmal Daily    aspirin  81 mg Oral Daily    clopidogrel  75 mg Oral Daily    metoprolol succinate  25 mg Oral Daily    losartan  25 mg Oral Daily    sodium hypochlorite   Irrigation BID     Continuous Infusions:   sodium chloride       PRN Meds:.morphine, oxymetazoline, prochlorperazine, hydrALAZINE, sodium chloride flush, sodium chloride, ondansetron **OR** ondansetron, acetaminophen **OR** acetaminophen, polyethylene glycol, potassium chloride **OR** potassium alternative oral replacement **OR** potassium chloride, magnesium sulfate, aluminum & magnesium hydroxide-simethicone, nitroGLYCERIN     OBJECTIVE:    Vital Signs:  Vitals:    22 1502   BP: (!) 121/56   Pulse: 87   Resp:    Temp:    SpO2:         I/O:    I/O last 3 completed shifts:   In: 5 [P.O.:420]  Out: 420 [Urine:420]   I/O this shift:  In: 120 [P.O.:120]  Out: 0       PHYSICAL EXAMINATION  General  Alert, cooperative, NAD  Lungs Unlabored breathing  Heart RRR  Abdominal Soft, nontender, nondistended, no involuntary guarding or rebound appreciated   Extremities No edema or gross deformities noted appreciated   Skin large chest wound status post punch biopsy in stable condition    Labs:   Lab Results   Component Value Date    WBC 24.1 (H) 2022 HGB 10.7 (L) 11/16/2022    HCT 31.9 (L) 11/16/2022    MCV 96.1 11/16/2022     11/16/2022        Lab Results   Component Value Date/Time     11/15/2022 05:11 AM    K 4.0 11/15/2022 05:11 AM     11/15/2022 05:11 AM    CO2 21 11/15/2022 05:11 AM    BUN 22 11/15/2022 05:11 AM    CREATININE 1.10 11/15/2022 05:11 AM    GLUCOSE 111 11/15/2022 05:11 AM    CALCIUM 8.6 11/15/2022 05:11 AM         Imaging Studies:     ASSESSMENT:    Principal Problem:    ST elevation myocardial infarction involving left anterior descending (LAD) coronary artery (HCC)  Active Problems:    Tobacco abuse    Marijuana abuse    Epistaxis    Pseudomonas aeruginosa infection  Resolved Problems:    * No resolved hospital problems. *    -AFVSS      PLAN:    Pathology came back as basal cell carcinoma of the chest wound. I discussed this pathology with the patient and discussed with him that I will see him in the office and we will talk about wide excision. I did have plastic surgery evaluate him as I think he will need a flap or skin graft in order to close this area after excision. We will plan on doing this once he has over all of his acute issues at this time. Continue with daily dressing changes.   Please call with any concerns    Electronically signed by:  Yony Leach DO  11/16/2022

## 2022-11-16 NOTE — MANAGEMENT PLAN
Called to patient's bedside due to recurrent epistaxis. Patient came to the ER this weekend d/t acute nose bleed. Once bleeding stopped, patient complained of not feeling well with chest pain and shortness of breath. EKG showed STEMI and patient was taken to the cath lab. He has done well since admission. Rhino rockets were removed on 11/13. Tonight patient had spontaneous bleeding. Call placed to Dr. Brooke Gutierrez for assistance. Initially placed rhino rocket in left nares but patient continued to bleed.  Afrin spray given in right nares    Plan:  -- place rhino rocket in right if needed  -- TXA to be given by Dr. Brooke Gutierrez  -- consult to ENT in the AM for further management      REGAN Sweeney CNP  1:31 AM

## 2022-11-16 NOTE — PROGRESS NOTES
Physician Progress Note      PATIENT:               Clement Cid  CSN #:                  [id number]  :                       1950  ADMIT DATE:       2022 10:23 PM  [address] [place] DATE:  Marielle Dial  PROVIDER #:        Ruddy Sam MD          QUERY TEXT:    Pt admitted with epistaxis. Pt noted to have STEMI s/p Afrin use. If possible,   please document in progress notes and discharge summary the relationship, if   any, between STEMI and Afrin. The medical record reflects the following:  Risk Factors: tobacco abuse  Clinical Indicators: \"admitted with acute epistaxis. He developed substernal   chest pain. EKG demonstrated anterior ST elevation myocardial infarction. STEMI protocol was activated\"  Treatment: heart cath, EKG, 324mg ASA, 1 SL NTG, NTG paste, 4000 units IV   Heparin and started on IVF  Options provided:  -- STEMI due to Afrin  -- STEMI unrelated to Afrin  -- Other - I will add my own diagnosis  -- Disagree - Not applicable / Not valid  -- Disagree - Clinically unable to determine / Unknown  -- Refer to Clinical Documentation Reviewer    PROVIDER RESPONSE TEXT:    Unclear if STEMI had anything to do with afrin use.     Query created by: Brook Espinoza on 2022 12:01 PM      Electronically signed by:  Ruddy Sam MD 2022 6:50 AM

## 2022-11-16 NOTE — PROGRESS NOTES
Attempted to contact wife Finn Irving to give update on pt status post rapid response. Unable to make contact, no seems to be wrong. Will attempt get number for pt.

## 2022-11-16 NOTE — CARE COORDINATION
LOS 3 D  CM reviewed clinical records. Patient had recurrent nose bleed overnight. Dual platelet therapy after recent PCI. ENT to consult. Non healing foreign body sternal wound- cultured Pseudomonas/ Staph Aureus. Plastic surgery consulted for wound closure. Patient to follow up in OP setting after acute infection cleared to discuss wound closure. No new CM needs identified.  CM remains available to assist.

## 2022-11-16 NOTE — PROGRESS NOTES
[facility] CARDIOLOGY PROGRESS NOTE           11/16/2022 8:11 AM    Admit Date: 11/12/2022         Subjective: Nose bleed overnight required Bilat Rhino Rockets. ENT consult today, WBC elevated today. ROS:  Cardiovascular:  As noted above    Objective:      Vitals:    11/16/22 0051 11/16/22 0145 11/16/22 0157 11/16/22 0400   BP: 132/83 (!) 182/84 (!) 162/87 (!) 161/87   Pulse: 73 85 86 71   Resp: 18 18  20   Temp: 97.8 °F (36.6 °C)   97.5 °F (36.4 °C)   TempSrc: Temporal   Temporal   SpO2: 95%   96%   Weight:    172 lb 11.2 oz (78.3 kg)   Height:             Physical Exam:  General: Well Developed, Well Nourished, No Acute Distress, Alert & Oriented x 3, Appropriate mood  Neck: supple, no JVD  Heart: S1S2 with RRR without murmurs or gallops  Lungs: Clear throughout auscultation bilaterally without adventitious sounds  Abd: soft, nontender, nondistended, with good bowel sounds  Ext: no edema bilaterally  Skin: warm and dry      Data Review:   Recent Labs     11/14/22  0537 11/15/22  0511 11/16/22  0627    139  --    K 3.9 4.0  --    MG 2.2 2.4  --    BUN 21 22  --    WBC 16.1* 14.8* 18.2*   HGB 11.0* 10.8* 10.8*   HCT 34.5* 33.4* 32.7*    316 364       No results for input(s): TNIPOC in the last 72 hours. Invalid input(s): TROIQ        Assessment/Plan:     Principal Problem:    ST elevation myocardial infarction involving left anterior descending (LAD) coronary artery (HCC)  Active Problems:    Tobacco abuse    Marijuana abuse    Epistaxis    Pseudomonas aeruginosa infection  Resolved Problems:    * No resolved hospital problems. *    A/P  1) STEMI - involving the LAD -continue dual antiplatelet therapy with aspirin and Plavix continue to atorvastatin 80 mg  2) Bleeding - ENT consult today for ongoing nose bleeds, cannot stop DAPT with recent LAD stent. 3) Chest wall wound -biopsy complete.   4) HTN -well-controlled on Cozaar 25 mg daily metoprolol XL 25 mg daily  5) Elevated WBC/+ wound culture - on ABX wbc elevated again will defer to hosp team for recs.     Mariama Gallagher MD  11/16/2022 8:11 AM

## 2022-11-16 NOTE — CONSULTS
Called the patient room 324 due to uncontrolled nosebleed. Patient originally came to the hospital for his epistaxis but while in the ED he was found to have a STEMI on his EKG. the patient was taken to the Cath Lab and had stents placed. Rhino Rocket's have been placed in both nares and after the procedure they were removed. Patient had achieved hemostasis. This evening however he had spontaneous bleeding again. Most of his bleeding seem to be coming from the left nares but he was swallowing and spitting a lot of blood as well. Patient been placed on Plavix after the procedure. Patient was found to be tripoding over a garbage pale spitting clots of blood. Rhino Rocket's were again placed in both nares after instilling TXA topically. Each Rhino Rocket was inflated with 10 cc of air and this did slow the bleeding down. The patient started having increasing bleeding again. The possibility of a posterior nosebleed was considered. The left nares Rhino Rocket has been removed and and a posterior packing balloon was inserted. The most distal 10 cc balloon was inflated with 4 cc of air and the 30 cc balloon was inflated with 10 cc of air. The patient would not tolerate any further inflation. This however was sufficient to stop the bleeding. Cardiology placed a consult for ENT. Nurses were given instructions to keep patient head of bed at least 30 degrees and set up suction in the room and provide the patient with a Yanker suction to help with secretions or further blood. Stasis was achieved. Critical care of 35 minutes.   The patient is critically ill with uncontrolled epistaxis and requires high complexity decision making for assessment and support including frequent evaluation and initiation of therapies , application of advanced monitoring technologies and extensive interpretation of multiple databases    Cumulative time devoted to patient care services by me for day of service -35 min     Kylah Mcarthur Shweta Galarza MD

## 2022-11-16 NOTE — CONSULTS
[facility] ENT consult note    Patient: Moody Mckenna  MRN: [id number]  : 1950  Gender:  male  Vital Signs: BP (!) 121/56   Pulse 87   Temp 97.9 °F (36.6 °C) (Oral)   Resp (!) 36   Ht 5' 9\" (1.753 m)   Wt 172 lb 11.2 oz (78.3 kg)   SpO2 98%   BMI 25.50 kg/m²   Date: 2022    CC:   Chief Complaint   Patient presents with    Epistaxis       HPI:  Moody Mckenna is a 74 y.o. male who presented to the hospital on  for epistaxis and bilateral Rhino Rocket's were placed in the emergency department. While in the ED he was found to have a STEMI on his EKG. The patient was taken to the Cath Lab and had stents placed. He is on Plavix and aspirin. Yesterday he had recurrent bleeding from his left nare and an Epistat was placed. He continued to have intermittent bleeding from his left nare. Patient has been hypertensive. Past Medical History, Past Surgical History, Family history, Social History, and Medications were all reviewed with the patient today and updated as necessary.      Allergies   Allergen Reactions    Diphenhydramine      Other reaction(s): Joint swelling-Intolerance    Codeine Rash     Patient Active Problem List   Diagnosis    ST elevation myocardial infarction involving left anterior descending (LAD) coronary artery (HCC)    Tobacco abuse    Marijuana abuse    Epistaxis    Pseudomonas aeruginosa infection     Current Facility-Administered Medications   Medication Dose Route Frequency    morphine injection 2 mg  2 mg IntraVENous Q3H PRN    hydrALAZINE (APRESOLINE) injection 20 mg  20 mg IntraVENous Once    hydrALAZINE (APRESOLINE) 20 MG/ML injection        silver nitrate applicators applicator 1 each  1 each Topical Once    oxymetazoline (AFRIN) 0.05 % nasal spray 2 spray  2 spray Each Nostril BID PRN    prochlorperazine (COMPAZINE) injection 10 mg  10 mg IntraVENous Q6H PRN    hydrALAZINE (APRESOLINE) injection 20 mg  20 mg IntraVENous Q4H PRN    vancomycin (VANCOCIN) 1250 mg in sodium chloride 0.9% 250 mL IVPB  1,250 mg IntraVENous Q24H    ciprofloxacin (CIPRO) tablet 750 mg  750 mg Oral 2 times per day    lidocaine 1 % injection 2 mL  2 mL IntraDERmal Once    sodium chloride flush 0.9 % injection 5-40 mL  5-40 mL IntraVENous 2 times per day    sodium chloride flush 0.9 % injection 5-40 mL  5-40 mL IntraVENous PRN    0.9 % sodium chloride infusion   IntraVENous PRN    ondansetron (ZOFRAN-ODT) disintegrating tablet 4 mg  4 mg Oral Q8H PRN    Or    ondansetron (ZOFRAN) injection 4 mg  4 mg IntraVENous Q6H PRN    acetaminophen (TYLENOL) tablet 650 mg  650 mg Oral Q6H PRN    Or    acetaminophen (TYLENOL) suppository 650 mg  650 mg Rectal Q6H PRN    polyethylene glycol (GLYCOLAX) packet 17 g  17 g Oral Daily PRN    atorvastatin (LIPITOR) tablet 80 mg  80 mg Oral Nightly    potassium chloride (KLOR-CON M) extended release tablet 40 mEq  40 mEq Oral PRN    Or    potassium bicarb-citric acid (EFFER-K) effervescent tablet 40 mEq  40 mEq Oral PRN    Or    potassium chloride 10 mEq/100 mL IVPB (Peripheral Line)  10 mEq IntraVENous PRN    magnesium sulfate 2000 mg in 50 mL IVPB premix  2,000 mg IntraVENous PRN    aluminum & magnesium hydroxide-simethicone (MAALOX) 200-200-20 MG/5ML suspension 30 mL  30 mL Oral Q6H PRN    nicotine (NICODERM CQ) 14 MG/24HR 1 patch  1 patch TransDERmal Daily    aspirin chewable tablet 81 mg  81 mg Oral Daily    clopidogrel (PLAVIX) tablet 75 mg  75 mg Oral Daily    metoprolol succinate (TOPROL XL) extended release tablet 25 mg  25 mg Oral Daily    losartan (COZAAR) tablet 25 mg  25 mg Oral Daily    nitroGLYCERIN (NITROSTAT) SL tablet 0.4 mg  0.4 mg SubLINGual Q5 Min PRN    sodium hypochlorite (DAKINS) 0.125 % external solution   Irrigation BID     No past medical history on file.   Social History     Tobacco Use    Smoking status: Every Day     Packs/day: 1.00     Types: Cigarettes    Smokeless tobacco: Never   Substance Use Topics    Alcohol use: Not on file     Past Surgical History:   Procedure Laterality Date    CARDIAC PROCEDURE N/A 11/12/2022    Coronary angiography performed by Birgit Estevez MD at [facility] CATH LAB    CARDIAC PROCEDURE N/A 11/12/2022    Percutaneous coronary intervention performed by Birgit Estevez MD at [facility] LAB     No family history on file. ROS:    Review of Systems   Constitutional:  Negative for activity change, chills and fever. HENT:  Positive for nosebleeds. Negative for congestion, ear pain, sinus pain and tinnitus. Eyes:  Negative for pain, discharge and redness. Respiratory:  Negative for cough, choking and stridor. Cardiovascular:  Negative for chest pain, palpitations and leg swelling. Gastrointestinal:  Negative for abdominal distention, abdominal pain and constipation. Endocrine: Negative for cold intolerance, polydipsia and polyuria. Genitourinary:  Negative for difficulty urinating, dysuria and frequency. Musculoskeletal:  Negative for neck pain and neck stiffness. Skin:  Negative for color change. Allergic/Immunologic: Negative for environmental allergies, food allergies and immunocompromised state. Neurological:  Negative for dizziness, facial asymmetry and numbness. Hematological:  Negative for adenopathy. Does not bruise/bleed easily. Psychiatric/Behavioral:  Negative for agitation, behavioral problems and decreased concentration. PHYSICAL EXAM:    BP (!) 121/56   Pulse 87   Temp 97.9 °F (36.6 °C) (Oral)   Resp (!) 36   Ht 5' 9\" (1.753 m)   Wt 172 lb 11.2 oz (78.3 kg)   SpO2 98%   BMI 25.50 kg/m²     General: NAD, well-appearing  Neuro: No gross neuro deficits. CN's II-XII intact. No facial weakness. Eyes: EOMI. Pupils reactive. No periorbital edema/ecchymosis. Skin: No facial erythema, rashes or concerning lesions. Nose: Rhino Rocket in right nare, hemostatic. Epistat in left nare with bleeding anteriorly.   Mouth: Moist mucus membranes, normal tongue/palate mobility, no concerning mucosal lesions. Oropharynx: Some blood clot from the nasopharynx visible but no active bleeding. Ears: Normal appearing auricles, no hematomas. EACs clear with no cerumen impaction, healthy canal skin, TM's intact with no perforations or retraction pockets. No middle ear effusions. Neck: Soft, supple, no palpable lateral neck masses. No parotid or submandibular masses. No thyromegaly or palpable thyroid nodules. No surgical scars. Lymphatics: No palpable cervical LAD. Resp/Lungs: No audible stridor or wheezing, CTAB  Heart: RRR  Extremities: No clubbing or cyanosis. Procedure: Control of anterior epistaxis, complex  Indications: Epistaxis  Description: Informed consent was obtained. Rhino Rocket in right nare left in place as patient was hemostatic from the right side. Patient with Epistat in left nare with bleeding anteriorly. The Epistat was deflated and removed. The left nare was suctioned out. A Rhino Rocket was sprayed with Afrin and then placed in the left nare and inflated with resolution of epistaxis. Blood clot suctioned out of oropharynx. No active bleeding. Patient tolerated this well. Lab Results   Component Value Date    WBC 24.1 (H) 11/16/2022    HGB 10.7 (L) 11/16/2022    HCT 31.9 (L) 11/16/2022    MCV 96.1 11/16/2022     11/16/2022     Lab Results   Component Value Date/Time     11/15/2022 05:11 AM    K 4.0 11/15/2022 05:11 AM     11/15/2022 05:11 AM    CO2 21 11/15/2022 05:11 AM    BUN 22 11/15/2022 05:11 AM    CREATININE 1.10 11/15/2022 05:11 AM    GLUCOSE 111 11/15/2022 05:11 AM    CALCIUM 8.6 11/15/2022 05:11 AM              ASSESSMENT and PLAN  47-year-old male with recurrent epistaxis.   He is on aspirin and Plavix after cardiac stenting.    -Right-sided Rhino Rocket can be removed tomorrow.  -Left-sided Rhino Rocket can be removed in 2 to 3 days.  -Control hypertension  -Keep in hospital while bilateral Rhino Rocket's are in place.      Cecil Diamond MD  11/16/2022  Electronically signed    Note dictated using voice recognition software. Please excuse any typos.

## 2022-11-16 NOTE — PROGRESS NOTES
[facility]  Admission Date: 11/12/2022         Daily Progress Note: 11/16/2022    The patient's chart is reviewed and the patient is discussed with the staff. Background:     Seen by Dr. Shweta Galarza overnight for brisk epistaxis as follows:    Called the patient room 324 due to uncontrolled nosebleed. Patient originally came to the hospital for his epistaxis but while in the ED he was found to have a STEMI on his EKG. the patient was taken to the Cath Lab and had stents placed. Rhino Rocket's have been placed in both nares and after the procedure they were removed. Patient had achieved hemostasis. This evening however he had spontaneous bleeding again. Most of his bleeding seem to be coming from the left nares but he was swallowing and spitting a lot of blood as well. Patient been placed on Plavix after the procedure. Patient was found to be tripoding over a garbage pale spitting clots of blood. Rhino Rocket's were again placed in both nares after instilling TXA topically. Each Rhino Rocket was inflated with 10 cc of air and this did slow the bleeding down. The patient started having increasing bleeding again. The possibility of a posterior nosebleed was considered. The left nares Rhino Rocket has been removed and and a posterior packing balloon was inserted. The most distal 10 cc balloon was inflated with 4 cc of air and the 30 cc balloon was inflated with 10 cc of air. The patient would not tolerate any further inflation. This however was sufficient to stop the bleeding. Cardiology placed a consult for ENT. Nurses were given instructions to keep patient head of bed at least 30 degrees and set up suction in the room and provide the patient with a Yanker suction to help with secretions or further blood. Stasis was achieved. Critical care of 35 minutes.   The patient is critically ill with uncontrolled epistaxis and requires high complexity decision making for assessment and support including frequent evaluation and initiation of therapies , application of advanced monitoring technologies and extensive interpretation of multiple databases    Subjective:     Called to see pt urgently for recurrent epistaxis. By report, very brisk bleeding this afternoon. Asked to see pt for possible intubation for airway protection. On my arrival, pt sitting on side of be on RA with sat 97%. ICU outreach nurse applying pressure to both nares. Bleeding appears to have stopped with no blood per mouth. Dr. Magnus Hernandez from ENT coming emergently. Pt had been hypertensive with  but now down to 105 after morphine.      Current Facility-Administered Medications   Medication Dose Route Frequency    morphine injection 2 mg  2 mg IntraVENous Q3H PRN    hydrALAZINE (APRESOLINE) injection 20 mg  20 mg IntraVENous Once    ondansetron (ZOFRAN) injection 4 mg  4 mg IntraVENous Once    hydrALAZINE (APRESOLINE) 20 MG/ML injection        metoprolol (LOPRESSOR) injection 5 mg  5 mg IntraVENous Once    silver nitrate applicators applicator 1 each  1 each Topical Once    oxymetazoline (AFRIN) 0.05 % nasal spray 2 spray  2 spray Each Nostril BID PRN    etomidate (AMIDATE) 2 MG/ML injection        midazolam (VERSED) 5 MG/5ML injection        fentaNYL (SUBLIMAZE) 100 MCG/2ML injection        succinylcholine (ANECTINE) 200 MG/10ML injection        propofol 1000 MG/100ML injection        hydrALAZINE (APRESOLINE) injection 20 mg  20 mg IntraVENous Q6H PRN    vancomycin (VANCOCIN) 1250 mg in sodium chloride 0.9% 250 mL IVPB  1,250 mg IntraVENous Q24H    ciprofloxacin (CIPRO) tablet 750 mg  750 mg Oral 2 times per day    lidocaine 1 % injection 2 mL  2 mL IntraDERmal Once    sodium chloride flush 0.9 % injection 5-40 mL  5-40 mL IntraVENous 2 times per day    sodium chloride flush 0.9 % injection 5-40 mL  5-40 mL IntraVENous PRN    0.9 % sodium chloride infusion   IntraVENous PRN    ondansetron (ZOFRAN-ODT) disintegrating tablet 4 mg  4 mg Oral Q8H PRN    Or    ondansetron (ZOFRAN) injection 4 mg  4 mg IntraVENous Q6H PRN    acetaminophen (TYLENOL) tablet 650 mg  650 mg Oral Q6H PRN    Or    acetaminophen (TYLENOL) suppository 650 mg  650 mg Rectal Q6H PRN    polyethylene glycol (GLYCOLAX) packet 17 g  17 g Oral Daily PRN    atorvastatin (LIPITOR) tablet 80 mg  80 mg Oral Nightly    potassium chloride (KLOR-CON M) extended release tablet 40 mEq  40 mEq Oral PRN    Or    potassium bicarb-citric acid (EFFER-K) effervescent tablet 40 mEq  40 mEq Oral PRN    Or    potassium chloride 10 mEq/100 mL IVPB (Peripheral Line)  10 mEq IntraVENous PRN    magnesium sulfate 2000 mg in 50 mL IVPB premix  2,000 mg IntraVENous PRN    aluminum & magnesium hydroxide-simethicone (MAALOX) 200-200-20 MG/5ML suspension 30 mL  30 mL Oral Q6H PRN    nicotine (NICODERM CQ) 14 MG/24HR 1 patch  1 patch TransDERmal Daily    aspirin chewable tablet 81 mg  81 mg Oral Daily    clopidogrel (PLAVIX) tablet 75 mg  75 mg Oral Daily    metoprolol succinate (TOPROL XL) extended release tablet 25 mg  25 mg Oral Daily    losartan (COZAAR) tablet 25 mg  25 mg Oral Daily    nitroGLYCERIN (NITROSTAT) SL tablet 0.4 mg  0.4 mg SubLINGual Q5 Min PRN    sodium hypochlorite (DAKINS) 0.125 % external solution   Irrigation BID     Review of Systems: Comprehensive ROS negative except in HPI  Objective:   Blood pressure 131/60, pulse 88, temperature 97.9 °F (36.6 °C), temperature source Oral, resp. rate (!) 36, height 5' 9\" (1.753 m), weight 172 lb 11.2 oz (78.3 kg), SpO2 98 %. Intake/Output Summary (Last 24 hours) at 11/16/2022 1500  Last data filed at 11/16/2022 0859  Gross per 24 hour   Intake 300 ml   Output 420 ml   Net -120 ml     Physical Exam:   Constitutional:  the patient is well developed and in no acute distress  EENMT:  Sclera clear, pupils equal, oral mucosa moist  Respiratory: symmetric chest rise.  Decreased BS bilaterally with a few trace rhonchi  Cardiovascular:  RRR without M,G,R. There is no lower extremity edema. Gastrointestinal: soft and non-tender; with positive bowel sounds. Musculoskeletal: warm without cyanosis. Normal muscle tone. Skin:  no jaundice or rashes  Neurologic: symmetric strength, fluent speech  Psychiatric:  calm, appropriate, oriented x 4    Imaging: I performed an independent interpretation of the patient's images. CXR:     11/13, 12:        LAB:  Recent Labs     11/14/22 0537 11/15/22  0511 11/16/22  0627   WBC 16.1* 14.8* 18.2*   HGB 11.0* 10.8* 10.8*   HCT 34.5* 33.4* 32.7*    316 364     Recent Labs     11/14/22 0537 11/15/22  0511    139   K 3.9 4.0   * 111*   CO2 22 21   BUN 21 22   CREATININE 1.10 1.10   MG 2.2 2.4     No results for input(s): TROPHS, NTPROBNP, CRP, ESR in the last 72 hours. Recent Labs     11/14/22 0537 11/15/22  0511   GLUCOSE 116* 111*      Microbiology:   Recent Labs     11/15/22  1001   CULTURE NO GROWTH AFTER 20 HOURS  NO GROWTH AFTER 20 HOURS     ECHO: 11/12/22    TRANSTHORACIC ECHOCARDIOGRAM (TTE) COMPLETE (CONTRAST/BUBBLE/3D PRN) 11/13/2022  7:33 PM, 11/13/2022 12:00 AM (Final)    Interpretation Summary    Left Ventricle: Mildly reduced left ventricular systolic function. EF by 2D Simpsons Biplane is 42%. Left ventricle size is normal. Mildly increased wall thickness. Mid to distal anterior/anteroseptal wall hypokinesis. Abnormal diastolic function. Average E/e' ratio is 17.02.    Right Ventricle: Right ventricle size is normal. Normal systolic function. Aortic Valve: Tricuspid valve. Moderately calcified noncoronary cusp. Mitral Valve: Mildly thickened leaflet, at the anterior and posterior leaflets. Mild regurgitation. Technical qualifiers: Technically difficult study, color flow Doppler was performed and pulse wave and/or continuous wave Doppler was performed.     Signed by: Jean Saini DO on 11/13/2022  7:33 PM, Signed by: Unknown Provider Result on 11/13/2022 12:00 AM    Assessment and Plan:  (Medical Decision Making)   Principal Problem:    ST elevation myocardial infarction involving left anterior descending (LAD) coronary artery [facility])  Plan: Per cardiology. Pt s/p stent and on plavix. Active Problems:    Tobacco abuse  Plan: Cessation needed    Marijuana abuse  Plan: Cessation needed    Epistaxis  Plan: Had failed therapy with rhino rockets. ENT coming to manage and may need operative intervention. Maintaining airway and on RA with no gross bleeding to external pressure. Maintain for now. Hold on intubation since not necessary at this time. Pseudomonas aeruginosa infection  Plan: Cipro to continue      More than 50% of the time documented was spent in face-to-face contact with the patient and in the care of the patient on the floor/unit where the patient is located.     Gavi Skinner MD

## 2022-11-16 NOTE — PROGRESS NOTES
Patient with profuse bleeding from lt nare. Family member at bedside, stated Ruba Bartlett  was brought to [facility] this past Saturday because he had the same thing, nose bleed, and they placed a tampon in his left nose. \"  Dry Gauze, rolled and applied into lt nare, holding pressure to lt nare and bridge of nose. Paul Russo NP with [address] Cardiology arriving to bedside.

## 2022-11-16 NOTE — PROGRESS NOTES
Follow up information added to AVS. Dr. Alcira Shipman will discuss pathology results and surgical planning at follow up appointment. General surgery is signing off. Please call with acute surgical needs.

## 2022-11-17 LAB
BACTERIA SPEC CULT: ABNORMAL
GRAM STN SPEC: ABNORMAL
GRAM STN SPEC: ABNORMAL
HCT VFR BLD AUTO: 27.7 % (ref 41.1–50.3)
HCT VFR BLD AUTO: 28.6 % (ref 41.1–50.3)
HEPARIN INDUCED PLATELET ANTIBODY: 0.1
HGB BLD-MCNC: 9.1 G/DL (ref 13.6–17.2)
HGB BLD-MCNC: 9.3 G/DL (ref 13.6–17.2)
HIT INTERPRETATION: NEGATIVE
HIT OPTICAL DENSITY: NEGATIVE ABS
HIT PROFILE: NORMAL
SERVICE CMNT-IMP: ABNORMAL

## 2022-11-17 PROCEDURE — 99232 SBSQ HOSP IP/OBS MODERATE 35: CPT | Performed by: INTERNAL MEDICINE

## 2022-11-17 PROCEDURE — 36415 COLL VENOUS BLD VENIPUNCTURE: CPT

## 2022-11-17 PROCEDURE — 6370000000 HC RX 637 (ALT 250 FOR IP): Performed by: INTERNAL MEDICINE

## 2022-11-17 PROCEDURE — 85014 HEMATOCRIT: CPT

## 2022-11-17 PROCEDURE — 1100000003 HC PRIVATE W/ TELEMETRY

## 2022-11-17 PROCEDURE — 2580000003 HC RX 258: Performed by: INTERNAL MEDICINE

## 2022-11-17 PROCEDURE — 6370000000 HC RX 637 (ALT 250 FOR IP)

## 2022-11-17 PROCEDURE — 6360000002 HC RX W HCPCS: Performed by: INTERNAL MEDICINE

## 2022-11-17 RX ORDER — METOPROLOL SUCCINATE 25 MG/1
50 TABLET, EXTENDED RELEASE ORAL DAILY
Status: DISCONTINUED | OUTPATIENT
Start: 2022-11-17 | End: 2022-11-18 | Stop reason: HOSPADM

## 2022-11-17 RX ORDER — AMLODIPINE BESYLATE 5 MG/1
5 TABLET ORAL DAILY
Status: DISCONTINUED | OUTPATIENT
Start: 2022-11-17 | End: 2022-11-18 | Stop reason: HOSPADM

## 2022-11-17 RX ORDER — LOSARTAN POTASSIUM 50 MG/1
50 TABLET ORAL DAILY
Status: DISCONTINUED | OUTPATIENT
Start: 2022-11-17 | End: 2022-11-18 | Stop reason: HOSPADM

## 2022-11-17 RX ORDER — DOXYCYCLINE HYCLATE 100 MG/1
100 CAPSULE ORAL EVERY 12 HOURS SCHEDULED
Status: DISCONTINUED | OUTPATIENT
Start: 2022-11-17 | End: 2022-11-18 | Stop reason: HOSPADM

## 2022-11-17 RX ADMIN — DOXYCYCLINE HYCLATE 100 MG: 100 CAPSULE ORAL at 21:30

## 2022-11-17 RX ADMIN — METOPROLOL SUCCINATE 50 MG: 25 TABLET, EXTENDED RELEASE ORAL at 08:31

## 2022-11-17 RX ADMIN — CLOPIDOGREL BISULFATE 75 MG: 75 TABLET ORAL at 08:32

## 2022-11-17 RX ADMIN — CIPROFLOXACIN 750 MG: 500 TABLET, FILM COATED ORAL at 08:30

## 2022-11-17 RX ADMIN — Medication: at 22:04

## 2022-11-17 RX ADMIN — CIPROFLOXACIN 750 MG: 500 TABLET, FILM COATED ORAL at 21:30

## 2022-11-17 RX ADMIN — ATORVASTATIN CALCIUM 80 MG: 80 TABLET, FILM COATED ORAL at 21:36

## 2022-11-17 RX ADMIN — AMLODIPINE BESYLATE 5 MG: 5 TABLET ORAL at 08:31

## 2022-11-17 RX ADMIN — ASPIRIN 81 MG 81 MG: 81 TABLET ORAL at 08:30

## 2022-11-17 RX ADMIN — Medication: at 08:34

## 2022-11-17 RX ADMIN — VANCOMYCIN HYDROCHLORIDE 1250 MG: 10 INJECTION, POWDER, LYOPHILIZED, FOR SOLUTION INTRAVENOUS at 11:08

## 2022-11-17 RX ADMIN — SODIUM CHLORIDE, PRESERVATIVE FREE 10 ML: 5 INJECTION INTRAVENOUS at 08:33

## 2022-11-17 RX ADMIN — LOSARTAN POTASSIUM 50 MG: 50 TABLET, FILM COATED ORAL at 08:32

## 2022-11-17 RX ADMIN — SODIUM CHLORIDE, PRESERVATIVE FREE 10 ML: 5 INJECTION INTRAVENOUS at 21:40

## 2022-11-17 ASSESSMENT — PAIN SCALES - GENERAL
PAINLEVEL_OUTOF10: 0

## 2022-11-17 NOTE — PROGRESS NOTES
[facility] CARDIOLOGY PROGRESS NOTE           11/17/2022 7:49 AM    Admit Date: 11/12/2022      Subjective:   No cp or sob    Objective:      Vitals:    11/16/22 1750 11/16/22 1932 11/16/22 2320 11/17/22 0439   BP: (!) 149/71 (!) 141/66 138/65 (!) 146/66   Pulse:  95 87 93   Resp:  20 22 20   Temp:  98.1 °F (36.7 °C) 98.2 °F (36.8 °C) 98.1 °F (36.7 °C)   TempSrc:  Oral Oral Oral   SpO2:  98% 98% 96%   Weight:    173 lb (78.5 kg)   Height:           Physical Exam:  General-No Acute Distress  Neck- supple, no JVD, + rhino rockets  CV- regular rate and rhythm no MRG  Lung- clear bilaterally  Abd- soft, nontender, nondistended  Ext- no edema bilaterally.   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 asa, plavix, atorva    inc bb, inc arb, add ccb  Right Rhinorocket out today  Left Rhinorocket out Sat or MD Frederic  11/17/2022 7:49 AM No cp

## 2022-11-17 NOTE — PROGRESS NOTES
[facility]  Admission Date: 11/12/2022         Daily Progress Note: 11/17/2022    The patient's chart is reviewed and the patient is discussed with the staff. Background: Called the patient room 324 due to uncontrolled nosebleed. Patient originally came to the hospital for his epistaxis but while in the ED he was found to have a STEMI on his EKG. the patient was taken to the Cath Lab and had stents placed. Rhino Rocket's have been placed in both nares and after the procedure they were removed. Patient had achieved hemostasis. This evening however he had spontaneous bleeding again. Most of his bleeding seem to be coming from the left nares but he was swallowing and spitting a lot of blood as well. Patient been placed on Plavix after the procedure. Patient was found to be tripoding over a garbage pale spitting clots of blood. Rhino Rocket's were again placed in both nares after instilling TXA topically. Each Rhino Rocket was inflated with 10 cc of air and this did slow the bleeding down. The patient started having increasing bleeding again. The possibility of a posterior nosebleed was considered. The left nares Rhino Rocket has been removed and and a posterior packing balloon was inserted. The most distal 10 cc balloon was inflated with 4 cc of air and the 30 cc balloon was inflated with 10 cc of air. The patient would not tolerate any further inflation. This however was sufficient to stop the bleeding. Cardiology placed a consult for ENT. Nurses were given instructions to keep patient head of bed at least 30 degrees and set up suction in the room and provide the patient with a Yanker suction to help with secretions or further blood. Stasis was achieved. Critical care of 35 minutes.   The patient is critically ill with uncontrolled epistaxis and requires high complexity decision making for assessment and support including frequent evaluation and initiation of therapies , application of advanced monitoring technologies and extensive interpretation of multiple databases    Subjective:   Pt seen by ent yesterday when bleeding from left nares had persisted- rhinorocket placed- sprayed with afrin.   No further bleeding      Current Facility-Administered Medications   Medication Dose Route Frequency    metoprolol succinate (TOPROL XL) extended release tablet 50 mg  50 mg Oral Daily    losartan (COZAAR) tablet 50 mg  50 mg Oral Daily    amLODIPine (NORVASC) tablet 5 mg  5 mg Oral Daily    morphine injection 2 mg  2 mg IntraVENous Q3H PRN    hydrALAZINE (APRESOLINE) injection 20 mg  20 mg IntraVENous Once    silver nitrate applicators applicator 1 each  1 each Topical Once    oxymetazoline (AFRIN) 0.05 % nasal spray 2 spray  2 spray Each Nostril BID PRN    prochlorperazine (COMPAZINE) injection 10 mg  10 mg IntraVENous Q6H PRN    hydrALAZINE (APRESOLINE) injection 20 mg  20 mg IntraVENous Q4H PRN    traMADol (ULTRAM) tablet 50 mg  50 mg Oral Q6H PRN    vancomycin (VANCOCIN) 1250 mg in sodium chloride 0.9% 250 mL IVPB  1,250 mg IntraVENous Q24H    ciprofloxacin (CIPRO) tablet 750 mg  750 mg Oral 2 times per day    lidocaine 1 % injection 2 mL  2 mL IntraDERmal Once    sodium chloride flush 0.9 % injection 5-40 mL  5-40 mL IntraVENous 2 times per day    sodium chloride flush 0.9 % injection 5-40 mL  5-40 mL IntraVENous PRN    0.9 % sodium chloride infusion   IntraVENous PRN    ondansetron (ZOFRAN-ODT) disintegrating tablet 4 mg  4 mg Oral Q8H PRN    Or    ondansetron (ZOFRAN) injection 4 mg  4 mg IntraVENous Q6H PRN    acetaminophen (TYLENOL) tablet 650 mg  650 mg Oral Q6H PRN    Or    acetaminophen (TYLENOL) suppository 650 mg  650 mg Rectal Q6H PRN    polyethylene glycol (GLYCOLAX) packet 17 g  17 g Oral Daily PRN    atorvastatin (LIPITOR) tablet 80 mg  80 mg Oral Nightly    potassium chloride (KLOR-CON M) extended release tablet 40 mEq  40 mEq Oral PRN    Or    potassium bicarb-citric acid (EFFER-K) effervescent tablet 40 mEq  40 mEq Oral PRN    Or    potassium chloride 10 mEq/100 mL IVPB (Peripheral Line)  10 mEq IntraVENous PRN    magnesium sulfate 2000 mg in 50 mL IVPB premix  2,000 mg IntraVENous PRN    aluminum & magnesium hydroxide-simethicone (MAALOX) 200-200-20 MG/5ML suspension 30 mL  30 mL Oral Q6H PRN    nicotine (NICODERM CQ) 14 MG/24HR 1 patch  1 patch TransDERmal Daily    aspirin chewable tablet 81 mg  81 mg Oral Daily    clopidogrel (PLAVIX) tablet 75 mg  75 mg Oral Daily    nitroGLYCERIN (NITROSTAT) SL tablet 0.4 mg  0.4 mg SubLINGual Q5 Min PRN    sodium hypochlorite (DAKINS) 0.125 % external solution   Irrigation BID     Review of Systems    Constitutional: negative for fever, chills, sweats  Cardiovascular: negative for chest pain, palpitations, syncope, edema  Gastrointestinal:  negative for dysphagia, reflux, vomiting, diarrhea, abdominal pain, or melena  Neurologic:  negative for focal weakness, numbness, headache  Objective:     Vitals:    11/16/22 2320 11/17/22 0439 11/17/22 0812 11/17/22 0831   BP: 138/65 (!) 146/66 (!) 152/77 (!) 162/74   Pulse: 87 93 86 88   Resp: 22 20 17    Temp: 98.2 °F (36.8 °C) 98.1 °F (36.7 °C) 97.7 °F (36.5 °C)    TempSrc: Oral Oral Oral    SpO2: 98% 96% 96%    Weight:  173 lb (78.5 kg)     Height:         [unfilled]  Physical Exam:   Constitution:  the patient is well developed and in no acute distress  HEENT:  Sclera clear, pupils equal, oral mucosa moist  Respiratory: clearr  Cardiovascular:  RRR without M,G,R  Gastrointestinal: soft and non-tender; with positive bowel sounds. Musculoskeletal: warm without cyanosis. There is no lower extremity edema.   Skin:  no jaundice or rashes, no wounds   Neurologic: no gross neuro deficits     Psychiatric:  alert and oriented x 3    CXR:     LAB:  Recent Labs     11/15/22  0511 11/16/22  0627 11/16/22  1450 11/16/22  2355 11/17/22  0528   WBC 14.8* 18.2* 24.1*  --   --    HGB 10.8* 10.8* 10.7* 9.3* 9.1*   HCT 33.4* 32.7* 31.9* 28.6* 27.7*    364 428  --   --    INR  --   --  1.0  --   --      Recent Labs     11/15/22  0511      K 4.0   *   CO2 21   BUN 22   MG 2.4     No results for input(s): TROPHS, BNPNT, CRP, ESR in the last 72 hours. Invalid input(s): LAC  No results for input(s): PH, PCO2, PO2, HCO3 in the last 72 hours. Invalid input(s): PHI, PCO2I, PO2I, HCO3I  No results for input(s): SDES in the last 72 hours. Invalid input(s): CULT  Assessment and Plan:  (Medical Decision Making)   Principal Problem:    ST elevation myocardial infarction involving left anterior descending (LAD) coronary artery [facility])  Plan: Per cardiology. Pt s/p stent and on plavix. Active Problems:    Tobacco abuse  Plan: Cessation needed    Marijuana abuse  Plan: Cessation needed    Epistaxis  Plan: Had failed therapy with rhino rockets. ENT coming to manage and may need operative intervention. Maintaining airway and on RA with no gross bleeding to external pressure. Maintain for now. Hold on intubation since not necessary at this time.   ent  will decide when to remove rhinorockets    Pseudomonas aeruginosa infection  Plan: Cipro to continue    Michelle Thomson MD

## 2022-11-17 NOTE — PROGRESS NOTES
VANCO DAILY FOLLOW UP NOTE  [address] [facility] Pharmacokinetic Monitoring Service - Vancomycin    Consulting Provider: Dr. Traci Armendariz   Indication: SSTI  Target Concentration: Goal AUC/LANDRY 400-600 mg*hr/L  Day of Therapy: 3  Additional Antimicrobials: ciprofloxacin     Patient eligible for piperacillin-tazobactam to cefepime auto-substitution per P&T approved protocol? N/A    Pertinent Laboratory Values: Wt Readings from Last 1 Encounters:   11/17/22 173 lb (78.5 kg)     Temp Readings from Last 1 Encounters:   11/17/22 98.1 °F (36.7 °C) (Oral)     Recent Labs     11/15/22  0511 11/16/22  0627 11/16/22  1450   BUN 22  --   --    CREATININE 1.10  --   --    WBC 14.8* 18.2* 24.1*     Estimated Creatinine Clearance: 59 mL/min (based on SCr of 1.1 mg/dL). Lab Results   Component Value Date/Time    VANCORANDOM 6.7 11/16/2022 06:27 AM     MRSA Nasal Swab: N/A. Non-respiratory infection.     Assessment:  Date/Time Dose Concentration AUC         Note: Serum concentrations collected for AUC dosing may appear elevated if collected in close proximity to the dose administered, this is not necessarily an indication of toxicity    Plan:  Dosing recommendations based on Bayesian software  Continue vancomycin 1250 mg q24h  Anticipated AUC of 448 and trough concentration of 13.2 at steady state  Renal labs as indicated   Vancomycin concentration ordered for 11/18 @ 0400  Pharmacy will continue to monitor patient and adjust therapy as indicated    Thank you for the consult,  AR Harris [facility]

## 2022-11-17 NOTE — PROGRESS NOTES
RRT Clinical Rounding Nurse Update    Vitals:    11/17/22 0439 11/17/22 0812 11/17/22 0831 11/17/22 1224   BP: (!) 146/66 (!) 152/77 (!) 162/74 129/66   Pulse: 93 86 88 78   Resp: 20 17 17   Temp: 98.1 °F (36.7 °C) 97.7 °F (36.5 °C)  98.1 °F (36.7 °C)   TempSrc: Oral Oral  Oral   SpO2: 96% 96%  99%   Weight: 173 lb (78.5 kg)      Height:            DETERIORATION INDEX SCORE: 24     ASSESSMENT:  Previous outreach assessment was reviewed. There have been no significant changes since previous assessment. PLAN:  Will follow per RRT Clinical Rounding Program protocol.     Paula Ying RN  [place]: [place]: [phone]

## 2022-11-17 NOTE — PROGRESS NOTES
Hospitalist Progress Note   Admit Date:  2022 10:23 PM   Name:  Philis Dance   Age:  74 y.o. Sex:  male  :  1950   MRN:  [id number]   Room:  324/01    Presenting Complaint: Epistaxis     Reason(s) for Admission: Epistaxis [R04.0]  STEMI (ST elevation myocardial infarction) ([facility] [I21.3]  ST elevation myocardial infarction (STEMI), unspecified artery ([facility] [I21.3]  ACS (acute coronary syndrome) [facility]) [I24.9]     Hospital Course:   74 y.o. male with PMHx of Tobacco abuse, Marijuana abuse, and a non-healing chest wound (2/2 foreign body removal and wound never healed) who was admitted initially presented to emergency room with acute epistaxis and develop substernal chest pain. EKG with anterior ST elevation. Admitted to cardiology service due to STEMI. S/p LHC and stenting. Patient has ongoing chest wound. Plastic surgery was contacted given his nonhealing chest wound that has been going on for years. Plastic surgery has seen on 11/15, will coordinate with general surgery for surgery to be performed at a later date after current episode is resolved. Patient is status post biopsy for concern for skin cancer. Pathology came back positive for basal cell carcinoma. General surgery has discussed with patient on  and that patient will need to follow-up in office to talk about wide excision. Plastic surgery will evaluate him as well as most likely he will need a flap or skin graft in order to close the area of excision. Continue with daily dressing changes. Wound culture grew Pseudomonas and Staph. ID was consulted. Pt started on Vanc/Cipro. Has been having severe epistaxis during hospital course. Rapid response was called  due to this episode. Patient was in respiratory distress during episodes so pulmonology was consulted as well. ENT was consulted and patient had bilateral Rhino Rockets placed.   ENT recommended for right sided Rhino Rocket can be removed  and SCD's  Code status: Full Code    Hospital Problems:  Principal Problem:    ST elevation myocardial infarction involving left anterior descending (LAD) coronary artery (HCC)  Active Problems:    Tobacco abuse    Marijuana abuse    Epistaxis    Pseudomonas aeruginosa infection  Resolved Problems:    * No resolved hospital problems. *      Objective:   Patient Vitals for the past 24 hrs:   Temp Pulse Resp BP SpO2   11/17/22 1224 98.1 °F (36.7 °C) 78 17 129/66 99 %   11/17/22 0831 -- 88 -- (!) 162/74 --   11/17/22 0812 97.7 °F (36.5 °C) 86 17 (!) 152/77 96 %   11/17/22 0439 98.1 °F (36.7 °C) 93 20 (!) 146/66 96 %   11/16/22 2320 98.2 °F (36.8 °C) 87 22 138/65 98 %   11/16/22 1932 98.1 °F (36.7 °C) 95 20 (!) 141/66 98 %   11/16/22 1750 -- -- -- (!) 149/71 --   11/16/22 1618 97.6 °F (36.4 °C) 80 19 137/72 97 %   11/16/22 1502 -- 87 -- (!) 121/56 --   11/16/22 1457 -- 88 -- 131/60 --   11/16/22 1452 -- -- -- (!) 103/50 --   11/16/22 1452 -- 90 -- -- 98 %   11/16/22 1448 -- -- -- (!) 168/72 --   11/16/22 1447 -- 98 -- (!) 197/79 --   11/16/22 1442 -- -- -- (!) 184/78 --   11/16/22 1442 -- -- (!) 36 -- 98 %   11/16/22 1440 -- (!) 105 -- (!) 184/89 --   11/16/22 1433 -- (!) 109 26 (!) 179/84 97 %   11/16/22 1432 -- (!) 106 -- -- --   11/16/22 1430 -- -- -- (!) 191/89 --       Oxygen Therapy  SpO2: 99 %  Pulse Oximetry Type: Continuous  Pulse via Oximetry: 68 beats per minute  SPO2 High Alarm Limit: 100  SPO2 Low Alarm Limit POX: 89  Pulse Oximeter Device Mode: Continuous  Pulse Oximeter Device Location: Right, Finger  O2 Device: None (Room air)  Oximetry Probe Site Changed: No  Skin Assessment: Clean, dry, & intact  Skin Protection for O2 Device: N/A  O2 Flow Rate (L/min): 2 L/min  Oxygen Therapy: None (Room air)  O2 Delivery Method: Nasal cannula    Estimated body mass index is 25.55 kg/m² as calculated from the following:    Height as of this encounter: 5' 9\" (1.753 m).     Weight as of this encounter: 173 lb (78.5 kg).    Intake/Output Summary (Last 24 hours) at 11/17/2022 1313  Last data filed at 11/17/2022 0439  Gross per 24 hour   Intake 120 ml   Output 625 ml   Net -505 ml         Physical Exam:     Blood pressure 129/66, pulse 78, temperature 98.1 °F (36.7 °C), temperature source Oral, resp. rate 17, height 5' 9\" (1.753 m), weight 173 lb (78.5 kg), SpO2 99 %. General:    Well nourished. Head:  Normocephalic, atraumatic  Eyes:  Sclerae appear normal.  Pupils equally round. ENT:  B/l rhino rocket in place. Neck:  No restricted ROM. Trachea midline   CV:   RRR. No m/r/g. No jugular venous distension. Lungs:   CTAB. No wheezing, rhonchi, or rales. Symmetric expansion. Abdomen: Bowel sounds present. Soft, nontender, nondistended. Extremities: No cyanosis or clubbing. No edema  Skin:     Chest wall wound with pic below (11/12/22). Dressing in place C/D/I  Neuro:  CN II-XII grossly intact. Sensation intact. A&Ox3  Psych:  Normal mood and affect.             I have personally reviewed labs and tests showing:  Recent Labs:  Recent Results (from the past 48 hour(s))   Vancomycin Level, Random    Collection Time: 11/16/22  6:27 AM   Result Value Ref Range    Vancomycin Rm 6.7 UG/ML   CBC    Collection Time: 11/16/22  6:27 AM   Result Value Ref Range    WBC 18.2 (H) 4.3 - 11.1 K/uL    RBC 3.33 (L) 4.23 - 5.6 M/uL    Hemoglobin 10.8 (L) 13.6 - 17.2 g/dL    Hematocrit 32.7 (L) 41.1 - 50.3 %    MCV 98.2 82 - 102 FL    MCH 32.4 26.1 - 32.9 PG    MCHC 33.0 31.4 - 35.0 g/dL    RDW 13.3 11.9 - 14.6 %    Platelets 931 307 - 656 K/uL    MPV 10.2 9.4 - 12.3 FL    nRBC 0.00 0.0 - 0.2 K/uL   POCT Glucose    Collection Time: 11/16/22  2:33 PM   Result Value Ref Range    POC Glucose 146 (H) 65 - 100 mg/dL    Performed by: Ana    TYPE AND SCREEN    Collection Time: 11/16/22  2:46 PM   Result Value Ref Range    Crossmatch expiration date 11/19/2022,2359     ABO/Rh A NEGATIVE     Antibody Screen NEG    CBC with Auto Differential    Collection Time: 11/16/22  2:50 PM   Result Value Ref Range    WBC 24.1 (H) 4.3 - 11.1 K/uL    RBC 3.32 (L) 4.23 - 5.6 M/uL    Hemoglobin 10.7 (L) 13.6 - 17.2 g/dL    Hematocrit 31.9 (L) 41.1 - 50.3 %    MCV 96.1 82 - 102 FL    MCH 32.2 26.1 - 32.9 PG    MCHC 33.5 31.4 - 35.0 g/dL    RDW 13.2 11.9 - 14.6 %    Platelets 962 760 - 108 K/uL    MPV 10.3 9.4 - 12.3 FL    nRBC 0.00 0.0 - 0.2 K/uL    Differential Type AUTOMATED      Seg Neutrophils 78 43 - 78 %    Lymphocytes 10 (L) 13 - 44 %    Monocytes 10 4.0 - 12.0 %    Eosinophils % 0 (L) 0.5 - 7.8 %    Basophils 0 0.0 - 2.0 %    Immature Granulocytes 1 0.0 - 5.0 %    Segs Absolute 18.9 (H) 1.7 - 8.2 K/UL    Absolute Lymph # 2.5 0.5 - 4.6 K/UL    Absolute Mono # 2.4 (H) 0.1 - 1.3 K/UL    Absolute Eos # 0.1 0.0 - 0.8 K/UL    Basophils Absolute 0.1 0.0 - 0.2 K/UL    Absolute Immature Granulocyte 0.2 0.0 - 0.5 K/UL   HIT Panel    Collection Time: 11/16/22  2:50 PM   Result Value Ref Range    HIT Profile PERFORMED BY [facility]      Heparin Induced Plt Ab 0.100      HIT Optical Density Negative <0.400 ABS    HIT Interpretation Negative     APTT    Collection Time: 11/16/22  2:50 PM   Result Value Ref Range    PTT 31.2 24.5 - 34.2 SEC   Protime-INR    Collection Time: 11/16/22  2:50 PM   Result Value Ref Range    Protime 13.9 12.6 - 14.3 sec    INR 1.0     Hemoglobin and Hematocrit    Collection Time: 11/16/22 11:55 PM   Result Value Ref Range    Hemoglobin 9.3 (L) 13.6 - 17.2 g/dL    Hematocrit 28.6 (L) 41.1 - 50.3 %   Hemoglobin and Hematocrit    Collection Time: 11/17/22  5:28 AM   Result Value Ref Range    Hemoglobin 9.1 (L) 13.6 - 17.2 g/dL    Hematocrit 27.7 (L) 41.1 - 50.3 %       I have personally reviewed imaging studies showing: Other Studies:  XR CHEST PORTABLE   Final Result   1. Worsening central interstitial infiltrates.   The appearance is most   suggestive of evolving pulmonary edema although additional etiologies can have   this appearance. Recommend clinical correlation. XR CHEST PORTABLE   Final Result   Mild pulmonary vascular congestion with edema.              Current Meds:  Current Facility-Administered Medications   Medication Dose Route Frequency    metoprolol succinate (TOPROL XL) extended release tablet 50 mg  50 mg Oral Daily    losartan (COZAAR) tablet 50 mg  50 mg Oral Daily    amLODIPine (NORVASC) tablet 5 mg  5 mg Oral Daily    morphine injection 2 mg  2 mg IntraVENous Q3H PRN    hydrALAZINE (APRESOLINE) injection 20 mg  20 mg IntraVENous Once    silver nitrate applicators applicator 1 each  1 each Topical Once    oxymetazoline (AFRIN) 0.05 % nasal spray 2 spray  2 spray Each Nostril BID PRN    prochlorperazine (COMPAZINE) injection 10 mg  10 mg IntraVENous Q6H PRN    hydrALAZINE (APRESOLINE) injection 20 mg  20 mg IntraVENous Q4H PRN    traMADol (ULTRAM) tablet 50 mg  50 mg Oral Q6H PRN    vancomycin (VANCOCIN) 1250 mg in sodium chloride 0.9% 250 mL IVPB  1,250 mg IntraVENous Q24H    ciprofloxacin (CIPRO) tablet 750 mg  750 mg Oral 2 times per day    lidocaine 1 % injection 2 mL  2 mL IntraDERmal Once    sodium chloride flush 0.9 % injection 5-40 mL  5-40 mL IntraVENous 2 times per day    sodium chloride flush 0.9 % injection 5-40 mL  5-40 mL IntraVENous PRN    0.9 % sodium chloride infusion   IntraVENous PRN    ondansetron (ZOFRAN-ODT) disintegrating tablet 4 mg  4 mg Oral Q8H PRN    Or    ondansetron (ZOFRAN) injection 4 mg  4 mg IntraVENous Q6H PRN    acetaminophen (TYLENOL) tablet 650 mg  650 mg Oral Q6H PRN    Or    acetaminophen (TYLENOL) suppository 650 mg  650 mg Rectal Q6H PRN    polyethylene glycol (GLYCOLAX) packet 17 g  17 g Oral Daily PRN    atorvastatin (LIPITOR) tablet 80 mg  80 mg Oral Nightly    potassium chloride (KLOR-CON M) extended release tablet 40 mEq  40 mEq Oral PRN    Or    potassium bicarb-citric acid (EFFER-K) effervescent tablet 40 mEq  40 mEq Oral PRN    Or    potassium chloride 10 mEq/100 mL IVPB (Peripheral Line)  10 mEq IntraVENous PRN    magnesium sulfate 2000 mg in 50 mL IVPB premix  2,000 mg IntraVENous PRN    aluminum & magnesium hydroxide-simethicone (MAALOX) 200-200-20 MG/5ML suspension 30 mL  30 mL Oral Q6H PRN    nicotine (NICODERM CQ) 14 MG/24HR 1 patch  1 patch TransDERmal Daily    aspirin chewable tablet 81 mg  81 mg Oral Daily    clopidogrel (PLAVIX) tablet 75 mg  75 mg Oral Daily    nitroGLYCERIN (NITROSTAT) SL tablet 0.4 mg  0.4 mg SubLINGual Q5 Min PRN    sodium hypochlorite (DAKINS) 0.125 % external solution   Irrigation BID       Signed: Sheryle Quin, DO    Part of this note may have been written by using a voice dictation software. The note has been proof read but may still contain some grammatical/other typographical errors.

## 2022-11-17 NOTE — PLAN OF CARE
Problem: Discharge Planning  Goal: Discharge to home or other facility with appropriate resources  Outcome: Progressing     Problem: Pain  Goal: Verbalizes/displays adequate comfort level or baseline comfort level  Outcome: Progressing     Problem: Safety - Adult  Goal: Free from fall injury  Outcome: Progressing     Problem: ABCDS Injury Assessment  Goal: Absence of physical injury  Outcome: Progressing     Problem: Respiratory - Adult  Goal: Achieves optimal ventilation and oxygenation  Outcome: Progressing     Problem: Skin/Tissue Integrity - Adult  Goal: Skin integrity remains intact  Outcome: Progressing  Goal: Incisions, wounds, or drain sites healing without S/S of infection  Outcome: Progressing  Goal: Oral mucous membranes remain intact  Outcome: Progressing     Problem: Skin/Tissue Integrity  Goal: Absence of new skin breakdown  Description: 1. Monitor for areas of redness and/or skin breakdown  2. Assess vascular access sites hourly  3. Every 4-6 hours minimum:  Change oxygen saturation probe site  4. Every 4-6 hours:  If on nasal continuous positive airway pressure, respiratory therapy assess nares and determine need for appliance change or resting period.   Outcome: Progressing

## 2022-11-17 NOTE — PROGRESS NOTES
RRT Clinical Rounding Nurse Progress Report      SUBJECTIVE: Called to assess patient secondary to recent rapid response. Vitals:    11/16/22 2320 11/17/22 0439 11/17/22 0812 11/17/22 0831   BP: 138/65 (!) 146/66 (!) 152/77 (!) 162/74   Pulse: 87 93 86 88   Resp: 22 20 17    Temp: 98.2 °F (36.8 °C) 98.1 °F (36.7 °C) 97.7 °F (36.5 °C)    TempSrc: Oral Oral Oral    SpO2: 98% 96% 96%    Weight:  173 lb (78.5 kg)     Height:            DETERIORATION INDEX SCORE: 26      ASSESSMENT:  Pt resting in bed, watching TV, breakfast at bedside. A&O x 4, lung sounds clear. Rhino-rockets still in place, right one to be removed today. VSS, no complaints of CP post-PCI. No bleeding episodes overnight. PLAN:  Will follow per RRT Clinical Rounding Program protocol.     Damaris Sewell RN  [facility]: [place]: [phone]

## 2022-11-18 VITALS
WEIGHT: 162.92 LBS | HEART RATE: 75 BPM | SYSTOLIC BLOOD PRESSURE: 111 MMHG | BODY MASS INDEX: 24.13 KG/M2 | HEIGHT: 69 IN | TEMPERATURE: 97.5 F | RESPIRATION RATE: 18 BRPM | OXYGEN SATURATION: 98 % | DIASTOLIC BLOOD PRESSURE: 63 MMHG

## 2022-11-18 PROBLEM — C44.91 BASAL CELL CARCINOMA: Status: ACTIVE | Noted: 2022-11-18

## 2022-11-18 LAB
CREAT SERPL-MCNC: 1.2 MG/DL (ref 0.8–1.5)
ERYTHROCYTE [DISTWIDTH] IN BLOOD BY AUTOMATED COUNT: 13.6 % (ref 11.9–14.6)
HCT VFR BLD AUTO: 28.9 % (ref 41.1–50.3)
HGB BLD-MCNC: 9.2 G/DL (ref 13.6–17.2)
MCH RBC QN AUTO: 31.3 PG (ref 26.1–32.9)
MCHC RBC AUTO-ENTMCNC: 31.8 G/DL (ref 31.4–35)
MCV RBC AUTO: 98.3 FL (ref 82–102)
NRBC # BLD: 0 K/UL (ref 0–0.2)
PLATELET # BLD AUTO: 430 K/UL (ref 150–450)
PMV BLD AUTO: 9.9 FL (ref 9.4–12.3)
RBC # BLD AUTO: 2.94 M/UL (ref 4.23–5.6)
VANCOMYCIN SERPL-MCNC: 7.5 UG/ML
WBC # BLD AUTO: 24.1 K/UL (ref 4.3–11.1)

## 2022-11-18 PROCEDURE — 99232 SBSQ HOSP IP/OBS MODERATE 35: CPT | Performed by: INTERNAL MEDICINE

## 2022-11-18 PROCEDURE — 6370000000 HC RX 637 (ALT 250 FOR IP): Performed by: INTERNAL MEDICINE

## 2022-11-18 PROCEDURE — 80202 ASSAY OF VANCOMYCIN: CPT

## 2022-11-18 PROCEDURE — 36415 COLL VENOUS BLD VENIPUNCTURE: CPT

## 2022-11-18 PROCEDURE — 99238 HOSP IP/OBS DSCHRG MGMT 30/<: CPT | Performed by: INTERNAL MEDICINE

## 2022-11-18 PROCEDURE — 6370000000 HC RX 637 (ALT 250 FOR IP)

## 2022-11-18 PROCEDURE — 82565 ASSAY OF CREATININE: CPT

## 2022-11-18 PROCEDURE — 2580000003 HC RX 258: Performed by: INTERNAL MEDICINE

## 2022-11-18 PROCEDURE — 85027 COMPLETE CBC AUTOMATED: CPT

## 2022-11-18 RX ORDER — ATORVASTATIN CALCIUM 80 MG/1
80 TABLET, FILM COATED ORAL NIGHTLY
Qty: 90 TABLET | Refills: 3 | Status: SHIPPED | OUTPATIENT
Start: 2022-11-18

## 2022-11-18 RX ORDER — NICOTINE 21 MG/24HR
1 PATCH, TRANSDERMAL 24 HOURS TRANSDERMAL DAILY
Qty: 30 PATCH | Refills: 3 | Status: SHIPPED | OUTPATIENT
Start: 2022-11-19

## 2022-11-18 RX ORDER — CIPROFLOXACIN 750 MG/1
750 TABLET, FILM COATED ORAL EVERY 12 HOURS SCHEDULED
Qty: 12 TABLET | Refills: 0 | Status: SHIPPED | OUTPATIENT
Start: 2022-11-18 | End: 2022-11-24

## 2022-11-18 RX ORDER — LOSARTAN POTASSIUM 50 MG/1
50 TABLET ORAL DAILY
Qty: 90 TABLET | Refills: 3 | Status: SHIPPED | OUTPATIENT
Start: 2022-11-19

## 2022-11-18 RX ORDER — NITROGLYCERIN 0.4 MG/1
0.4 TABLET SUBLINGUAL EVERY 5 MIN PRN
Qty: 25 TABLET | Refills: 3 | Status: SHIPPED | OUTPATIENT
Start: 2022-11-18

## 2022-11-18 RX ORDER — ASPIRIN 81 MG/1
81 TABLET, CHEWABLE ORAL DAILY
Qty: 30 TABLET | Refills: 3 | Status: SHIPPED | OUTPATIENT
Start: 2022-11-19

## 2022-11-18 RX ORDER — AMLODIPINE BESYLATE 5 MG/1
5 TABLET ORAL DAILY
Qty: 90 TABLET | Refills: 3 | Status: SHIPPED | OUTPATIENT
Start: 2022-11-19

## 2022-11-18 RX ORDER — CLOPIDOGREL BISULFATE 75 MG/1
75 TABLET ORAL DAILY
Qty: 90 TABLET | Refills: 3 | Status: SHIPPED | OUTPATIENT
Start: 2022-11-19

## 2022-11-18 RX ORDER — DOXYCYCLINE HYCLATE 100 MG/1
100 CAPSULE ORAL EVERY 12 HOURS SCHEDULED
Qty: 12 CAPSULE | Refills: 0 | Status: SHIPPED | OUTPATIENT
Start: 2022-11-18 | End: 2022-11-24

## 2022-11-18 RX ORDER — METOPROLOL SUCCINATE 50 MG/1
50 TABLET, EXTENDED RELEASE ORAL DAILY
Qty: 90 TABLET | Refills: 3 | Status: SHIPPED | OUTPATIENT
Start: 2022-11-19

## 2022-11-18 RX ADMIN — CIPROFLOXACIN 750 MG: 500 TABLET, FILM COATED ORAL at 08:07

## 2022-11-18 RX ADMIN — DOXYCYCLINE HYCLATE 100 MG: 100 CAPSULE ORAL at 08:07

## 2022-11-18 RX ADMIN — Medication: at 08:07

## 2022-11-18 RX ADMIN — SODIUM CHLORIDE, PRESERVATIVE FREE 10 ML: 5 INJECTION INTRAVENOUS at 08:11

## 2022-11-18 RX ADMIN — CLOPIDOGREL BISULFATE 75 MG: 75 TABLET ORAL at 08:07

## 2022-11-18 RX ADMIN — AMLODIPINE BESYLATE 5 MG: 5 TABLET ORAL at 08:06

## 2022-11-18 RX ADMIN — ASPIRIN 81 MG 81 MG: 81 TABLET ORAL at 08:06

## 2022-11-18 RX ADMIN — METOPROLOL SUCCINATE 50 MG: 25 TABLET, EXTENDED RELEASE ORAL at 08:06

## 2022-11-18 RX ADMIN — LOSARTAN POTASSIUM 50 MG: 50 TABLET, FILM COATED ORAL at 08:07

## 2022-11-18 ASSESSMENT — PAIN SCALES - GENERAL
PAINLEVEL_OUTOF10: 0

## 2022-11-18 NOTE — DISCHARGE SUMMARY
Patient seen and examined. Doing well without chest discomfort, shortness of breath. Ambulating halls without symptoms. She is status post anterior myocardial infarction with residual circumflex disease. Hospital course complicated by large squamous cell. Epistaxis has resolved. On chest wall that is subsequently infected and recurrent epistaxis. Nosebleeds have now stopped. Patient will complete antibiotics for infection. He is stable from CV standpoint to discharge home. Blood pressure (!) 110/55, pulse 75, temperature 97.5 °F (36.4 °C), temperature source Oral, resp. rate 18, height 5' 9\" (1.753 m), weight 162 lb 14.7 oz (73.9 kg), SpO2 98 %. CV-  RRR without murmur, rub, gallop  Lungs- clear bilaterally  Abd- soft, NT, ND  Ext- No edema, right groin clean, dry, no ecchymosis or hematome    Recent Labs     11/18/22  0502   HGB 9.2*   HCT 28.9*   WBC 24.1*        22  Assessment/Plan:  1) CAD- s/p PCI. Importance of DAPT daily stressed    2) Continue aggressive risk factor modification in the outpatient setting. See D/C summary for discharge medications and home therapies. 3) Followup with [facility] in 2 weeks, primary care MD in 4-6 weeks. Patient will need close follow-up with dermatology for squamous cell carcinoma as well as ENT due to recurrent epistaxis.     Valerie Carr MD                    [facility] Discharge Summary     Patient ID:  Laura Rashid  [id number]  57 y.o.  1950    Admit date: 11/12/2022    Discharge date:  11/18/22    Admitting Physician: Em Poon MD     Discharge Physician: NIDIA Blair/Dr. Nanci Harden    Admission Diagnoses: Epistaxis [R04.0]  STEMI (ST elevation myocardial infarction) ([facility] [I21.3]  ST elevation myocardial infarction (STEMI), unspecified artery [facility]) [I21.3]  ACS (acute coronary syndrome) [facility]) [I24.9]    Discharge Diagnoses:   Patient Active Problem List    Diagnosis    ST elevation myocardial punch biopsy obtained due to concern for underlying cancer. Biopsies did come back for infiltrative basal cell carcinoma. It was felt pt may need plastic surgery and follow up- surgery recommended this be done outpt. Hospitalist consulted fo rnon healing chest wound. Antibiotics started, ID consulted and pt placed on vanc and cipro for pseudomonas and staph aureus. 11-16 pt had recurrent epistaxis- rapid response called- Dr Guillermo Chaney called to bedside and rhino rockets replaced but pt had continued bleeding on left and nare packed. Pulmonary consulted due to possible need for intubation to protect airway w profuse bleeding. Seen by ENT and recommended to remain in hospital with rhino rockets in place. Rhino rocket removed from right on 11-17. ID recommended change antibiotics to doxy and cipro to compete 7 more days of antibiotics. The afternoon of 11/18/22 patient was up feeling well without any complaints of chest pain or shortness of breath. Patient's right radial cath site was clean, dry and intact without hematoma or bruit. Patient's labs were WNL w stable hgb of 9.2. Tele monitor w no arrythmias. Patient was seen and examined by Dr. Jen Hammer and determined stable and ready for discharge. Patient was instructed on the importance of medication compliance including taking aspirin and plavixeveryday without missing a dose. After receiving drug eluting stents, the patient will need to be on dual anti-platelet therapy for at least 1 year. For maximized medical therapy of CAD and CHF, patient will continue use of BB, ARB, and statin as well. The patient will  have close transitional care follow up with [facility] Dr Hemant Ricks Dec 2 at 200 noon- [place] office and has been referred to cardiac rehab. Will need to discuss possible staged procedure of the circ. F/U with Dr Dereck Almodovar in 1 week re skin cancer of chest, f/u w ENT in 1 one week. CBC and BMP in one week.       15 minutes spent discussing hospital course and discharge instructions w pt and wife. 30 additional minutes spent competing dc. Discussed w ENT prior to discharge. All questions answered. Given printed scripts/escribed scripts for new meds, refills on all meds needed. Encouraged smoking cessation. DISPOSITION: The patient is being discharged home in stable condition on a low saturated fat, low cholesterol and low salt diet. The patient is instructed to advance activities as tolerated to the limit of fatigue or shortness of breath. The patient is instructed to avoid all heavy lifting for 5 days. The patient is instructed to watch the cath site for bleeding/oozing; if seen, the patient is instructed to apply firm pressure with a clean cloth and call [facility] at [phone]. The patient is instructed to watch for signs of infection which include: increasing area of redness, fever/hot to touch or purulent drainage at the catheterization site. The patient is instructed not to soak in a bathtub for 7-10 days, but is cleared to shower. The patient is instructed to call the office or return to the ER for immediate evaluation for any shortness of breath or chest pain not relieved by NTG. Discharge Exam: /69   Pulse 81   Temp 98.2 °F (36.8 °C) (Oral)   Resp 17   Ht 5' 9\" (1.753 m)   Wt 162 lb 14.7 oz (73.9 kg)   SpO2 97%   BMI 24.06 kg/m²   Patient has been seen by Dr. Carito Spangler  : see his progress note for exam details.     Recent Results (from the past 24 hour(s))   CBC    Collection Time: 11/18/22  5:02 AM   Result Value Ref Range    WBC 24.1 (H) 4.3 - 11.1 K/uL    RBC 2.94 (L) 4.23 - 5.6 M/uL    Hemoglobin 9.2 (L) 13.6 - 17.2 g/dL    Hematocrit 28.9 (L) 41.1 - 50.3 %    MCV 98.3 82 - 102 FL    MCH 31.3 26.1 - 32.9 PG    MCHC 31.8 31.4 - 35.0 g/dL    RDW 13.6 11.9 - 14.6 %    Platelets 272 903 - 850 K/uL    MPV 9.9 9.4 - 12.3 FL    nRBC 0.00 0.0 - 0.2 K/uL   Creatinine    Collection Time: 11/18/22  5:02 AM Result Value Ref Range    Creatinine 1.20 0.8 - 1.5 MG/DL   Vancomycin Level, Random    Collection Time: 11/18/22  5:02 AM   Result Value Ref Range    Vancomycin Rm 7.5 UG/ML         Patient Instructions:   Current Discharge Medication List        START taking these medications    Details   amLODIPine (NORVASC) 5 MG tablet Take 1 tablet by mouth daily  Qty: 90 tablet, Refills: 3      aspirin 81 MG chewable tablet Take 1 tablet by mouth daily  Qty: 30 tablet, Refills: 3      atorvastatin (LIPITOR) 80 MG tablet Take 1 tablet by mouth nightly  Qty: 90 tablet, Refills: 3      ciprofloxacin (CIPRO) 750 MG tablet Take 1 tablet by mouth every 12 hours for 6 days  Qty: 12 tablet, Refills: 0      clopidogrel (PLAVIX) 75 MG tablet Take 1 tablet by mouth daily  Qty: 90 tablet, Refills: 3      doxycycline hyclate (VIBRAMYCIN) 100 MG capsule Take 1 capsule by mouth every 12 hours for 12 doses  Qty: 12 capsule, Refills: 0      losartan (COZAAR) 50 MG tablet Take 1 tablet by mouth daily  Qty: 90 tablet, Refills: 3      metoprolol succinate (TOPROL XL) 50 MG extended release tablet Take 1 tablet by mouth daily  Qty: 90 tablet, Refills: 3      nicotine (NICODERM CQ) 14 MG/24HR Place 1 patch onto the skin daily  Qty: 30 patch, Refills: 3      nitroGLYCERIN (NITROSTAT) 0.4 MG SL tablet Place 1 tablet under the tongue every 5 minutes as needed for Chest pain up to max of 3 total doses. If no relief after 1 dose, call 911.   Qty: 25 tablet, Refills: 3                   Signed:  Jannet Heart PA-C  11/18/2022  7:58 AM

## 2022-11-18 NOTE — PROGRESS NOTES
RRT Clinical Rounding Nurse Progress Report      SUBJECTIVE: Called to assess patient secondary to recent rapid response. Vitals:    11/17/22 0812 11/17/22 0831 11/17/22 1224 11/17/22 1634   BP: (!) 152/77 (!) 162/74 129/66 138/75   Pulse: 86 88 78 97   Resp: 17 17 19   Temp: 97.7 °F (36.5 °C)  98.1 °F (36.7 °C) 98.1 °F (36.7 °C)   TempSrc: Oral  Oral Oral   SpO2: 96%  99% 99%   Weight:       Height:            DETERIORATION INDEX SCORE: 28      ASSESSMENT:  Pt is A&O x4 and appears to be in NAD at this time. O2 sat 99% on RA. NSR on tele monitor. Pt denies any pain and voices no complaints. Pt has had no new bleeding from nose today. Bilateral rhino-rockets still in place. Discussed pt with primary RN. PLAN:  Will discharge from RRT Clinical Rounding Program per protocol. Please call if needed.     Cody Ramos, [address]: [facility]: [phone]

## 2022-11-18 NOTE — PROGRESS NOTES
Michelle Ojeda  Admission Date: 11/12/2022         Daily Progress Note: 11/18/2022    The patient's chart is reviewed and the patient is discussed with the staff. Background: Called the patient room 324 due to uncontrolled nosebleed. Patient originally came to the hospital for his epistaxis but while in the ED he was found to have a STEMI on his EKG. the patient was taken to the Cath Lab and had stents placed. Rhino Rocket's have been placed in both nares and after the procedure they were removed. Patient had achieved hemostasis. He had spontaneous bleeding again. Patient been placed on Plavix after the procedure. Rhino Rocket's were again placed in both nares after instilling TXA topically. This however was sufficient to stop the bleeding. Cardiology placed a consult for ENT. Nurses were given instructions to keep patient head of bed at least 30 degrees and set up suction in the room and provide the patient with a Yanker suction to help with secretions or further blood. Stasis was achieved. Subjective:   Pt seen by ent when bleeding from left nares had persisted- rhinorocket placed- sprayed with afrin.   No further bleeding for days  On RA    Current Facility-Administered Medications   Medication Dose Route Frequency    metoprolol succinate (TOPROL XL) extended release tablet 50 mg  50 mg Oral Daily    losartan (COZAAR) tablet 50 mg  50 mg Oral Daily    amLODIPine (NORVASC) tablet 5 mg  5 mg Oral Daily    doxycycline hyclate (VIBRAMYCIN) capsule 100 mg  100 mg Oral 2 times per day    morphine injection 2 mg  2 mg IntraVENous Q3H PRN    hydrALAZINE (APRESOLINE) injection 20 mg  20 mg IntraVENous Once    silver nitrate applicators applicator 1 each  1 each Topical Once    prochlorperazine (COMPAZINE) injection 10 mg  10 mg IntraVENous Q6H PRN    hydrALAZINE (APRESOLINE) injection 20 mg  20 mg IntraVENous Q4H PRN    traMADol (ULTRAM) tablet 50 mg  50 mg Oral Q6H PRN    ciprofloxacin (CIPRO) tablet 750 mg  750 mg Oral 2 times per day    lidocaine 1 % injection 2 mL  2 mL IntraDERmal Once    sodium chloride flush 0.9 % injection 5-40 mL  5-40 mL IntraVENous 2 times per day    sodium chloride flush 0.9 % injection 5-40 mL  5-40 mL IntraVENous PRN    0.9 % sodium chloride infusion   IntraVENous PRN    ondansetron (ZOFRAN-ODT) disintegrating tablet 4 mg  4 mg Oral Q8H PRN    Or    ondansetron (ZOFRAN) injection 4 mg  4 mg IntraVENous Q6H PRN    acetaminophen (TYLENOL) tablet 650 mg  650 mg Oral Q6H PRN    Or    acetaminophen (TYLENOL) suppository 650 mg  650 mg Rectal Q6H PRN    polyethylene glycol (GLYCOLAX) packet 17 g  17 g Oral Daily PRN    atorvastatin (LIPITOR) tablet 80 mg  80 mg Oral Nightly    potassium chloride (KLOR-CON M) extended release tablet 40 mEq  40 mEq Oral PRN    Or    potassium bicarb-citric acid (EFFER-K) effervescent tablet 40 mEq  40 mEq Oral PRN    Or    potassium chloride 10 mEq/100 mL IVPB (Peripheral Line)  10 mEq IntraVENous PRN    magnesium sulfate 2000 mg in 50 mL IVPB premix  2,000 mg IntraVENous PRN    aluminum & magnesium hydroxide-simethicone (MAALOX) 200-200-20 MG/5ML suspension 30 mL  30 mL Oral Q6H PRN    nicotine (NICODERM CQ) 14 MG/24HR 1 patch  1 patch TransDERmal Daily    aspirin chewable tablet 81 mg  81 mg Oral Daily    clopidogrel (PLAVIX) tablet 75 mg  75 mg Oral Daily    nitroGLYCERIN (NITROSTAT) SL tablet 0.4 mg  0.4 mg SubLINGual Q5 Min PRN    sodium hypochlorite (DAKINS) 0.125 % external solution   Irrigation BID     Review of Systems    Constitutional: negative for fever, chills, sweats  Cardiovascular: negative for chest pain, palpitations, syncope, edema  Gastrointestinal:  negative for dysphagia, reflux, vomiting, diarrhea, abdominal pain, or melena  Neurologic:  negative for focal weakness, numbness, headache  Objective:     Vitals:    11/18/22 0454 11/18/22 0455 11/18/22 0728 11/18/22 0806   BP: (!) 143/68  132/69 130/63   Pulse: 88  81 80 Resp: 17  17    Temp: 98.2 °F (36.8 °C)  97.5 °F (36.4 °C)    TempSrc: Oral  Oral    SpO2: 95%  97%    Weight:  162 lb 14.7 oz (73.9 kg)     Height:         [unfilled]  Physical Exam:   Constitution:  the patient is well developed and in no acute distress  HEENT:  Sclera clear, pupils equal, oral mucosa moist  Respiratory: clear on RA  Cardiovascular:  RRR without M,G,R  Gastrointestinal: soft and non-tender; with positive bowel sounds. Musculoskeletal: warm without cyanosis. There is no lower extremity edema. Skin:  no jaundice or rashes, no wounds   Neurologic: no gross neuro deficits     Psychiatric:  alert and oriented x 3        LAB:  Recent Labs     11/16/22  0627 11/16/22  1450 11/16/22  2355 11/17/22  0528 11/18/22  0502   WBC 18.2* 24.1*  --   --  24.1*   HGB 10.8* 10.7* 9.3* 9.1* 9.2*   HCT 32.7* 31.9* 28.6* 27.7* 28.9*    428  --   --  430   INR  --  1.0  --   --   --        No results for input(s): NA, K, CL, CO2, GLU, BUN, CREA, MG, CA, PHOS, ALB, BILITOT, AST, ALT in the last 72 hours. Invalid input(s): AP    No results for input(s): TROPHS, BNPNT, CRP, ESR in the last 72 hours. Invalid input(s): LAC  No results for input(s): PH, PCO2, PO2, HCO3 in the last 72 hours. Invalid input(s): PHI, PCO2I, PO2I, HCO3I  No results for input(s): SDES in the last 72 hours. Invalid input(s): CULT  Assessment and Plan:  (Medical Decision Making)        ST elevation myocardial infarction involving left anterior descending (LAD) coronary artery [facility])   Per cardiology. Pt s/p stent and on plavix. Tobacco abuse   Cessation needed      Marijuana abuse  Cessation needed      Epistaxis  Had failed therapy with rhino rockets. ENT cmanaging. and may need operative intervention. Maintaining airway and on RA with no gross bleeding to external pressure. Maintain for now. Hold on intubation since not necessary at this time.   ent  will decide when to remove rhinorockets      Basal cell carcinoma  Plastic surgery was contacted given his nonhealing chest wound that has been going on for years. Plastic surgery saw 11/15, S/P biopsy-positive for basal cell carcinoma. Oncology will be consulted. Pseudomonas aeruginosa infection  Cipro and doxycyline to continue    ID outlined abx, hme/onc consulted. On RA. ENT following, epstaxis abated  We will sign off, please call if needed. Terri Perry NP, APRN - CNP     In this split/shared evaluation I performed reviewed the patients's H&P, available images, labs, cultures. , discussed case in detail with NPP, performed a medically appropriate history and exam, counseled and educated the patient and/or family member, ordered and/or reviewed medications, tests or procedures, documented information in EMR, independently interpreted images, and coordinated care. which accounted for 12 minutes clinical time. Impression:   Mr. João Mccann is off oxygen. Epistaxis has resolved. [facility] will be available as needed.   Let us know if there are further concerns with which we can Deondre Ellis MD

## 2022-11-18 NOTE — CARE COORDINATION
LOS 5 D    CM met with patient with tentative discharge this PM. Spouse not in room at the time of either CM visit. Patient reports he can do dressing change to non healing wound. CM discussed with patient's primary nurse, Ronny Noble. Ronny Noble will demonstrate wound care and provide patient with supplies. Patient reports he is agreeable to follow up with General Surgery and Plastic surgery for closure. Spouse to transport patient home.

## 2022-11-18 NOTE — PROGRESS NOTES
[facility] CARDIOLOGY PROGRESS NOTE           11/18/2022 7:59 AM    Admit Date: 11/12/2022      Subjective:   Gentleman lying flat in room. He denies chest pain. He has struggled with recurrent epistaxis. ROS:  Cardiovascular:  As noted above    Objective:      Vitals:    11/18/22 0017 11/18/22 0454 11/18/22 0455 11/18/22 0728   BP: (!) 146/68 (!) 143/68  132/69   Pulse: 88 88  81   Resp: 19 17     Temp: 98.2 °F (36.8 °C) 98.2 °F (36.8 °C)     TempSrc:  Oral     SpO2: 97% 95%  97%   Weight:   162 lb 14.7 oz (73.9 kg)    Height:           Physical Exam:  General-No Acute Distress  Neck- supple, no JVD  CV- regular rate and rhythm no MRG  Lung- clear bilaterally  Abd- soft, nontender, nondistended  Ext- no edema bilaterally. Skin- warm and dry    Data Review:   Recent Labs     11/16/22  1450 11/16/22  2355 11/17/22  0528 11/18/22  0502   WBC 24.1*  --   --  24.1*   HGB 10.7*   < > 9.1* 9.2*   HCT 31.9*   < > 27.7* 28.9*     --   --  430   INR 1.0  --   --   --     < > = values in this interval not displayed. Result status: Edited Result - FINAL       Left Ventricle: Mildly reduced left ventricular systolic function. EF by 2D Simpsons Biplane is 42%. Left ventricle size is normal. Mildly increased wall thickness. Mid to distal anterior/anteroseptal wall hypokinesis. Abnormal diastolic function. Average E/e' ratio is 17.02.    Right Ventricle: Right ventricle size is normal. Normal systolic function. Aortic Valve: Tricuspid valve. Moderately calcified noncoronary cusp. Mitral Valve: Mildly thickened leaflet, at the anterior and posterior leaflets. Mild regurgitation. Technical qualifiers: Technically difficult study, color flow Doppler was performed and pulse wave and/or continuous wave Doppler was performed.   Assessment/Plan:     Principal Problem:    ST elevation myocardial infarction involving left anterior descending (LAD) coronary artery ([address]  Plan: Patient status postacute occlusion of the ostium of the LAD status post stenting. He has residual disease within the distal circumflex which will be managed medically for now. He is stable on aspirin and clopidogrel. Active Problems:    Tobacco abuse  Plan: Cessation education    Marijuana abuse  Plan: Cessation education    Epistaxis  Plan: Patient with recurrent epistaxis. ENT has been following. Nasal packing removed this morning we will monitor until this afternoon. If no additional bleeding patient should be stable for discharge home with close follow-up with ENT as outpatient. 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  This is been seen by infectious disease and antibiotic recommendations are on the chart. Patient will need outpatient follow-up with dermatology.         Mark Gomez MD  11/18/2022 7:59 AM

## 2022-11-18 NOTE — PROGRESS NOTES
Hospitalist Progress Note   Admit Date:  2022 10:23 PM   Name:  Jim Dolan   Age:  74 y.o. Sex:  male  :  1950   MRN:  [id number]   Room:  [facility]/01    Presenting Complaint: Epistaxis     Reason(s) for Admission: Epistaxis [R04.0]  STEMI (ST elevation myocardial infarction) ([facility] [address] [I21.3]  ST elevation myocardial infarction (STEMI), unspecified artery ([facility] [address] [I21.3]  ACS (acute coronary syndrome) [facility]) [I24.9]     Hospital Course:   74 y.o. male with PMHx of Tobacco abuse, Marijuana abuse, and a non-healing chest wound (2/2 foreign body removal and wound never healed) who was admitted initially presented to emergency room with acute epistaxis and develop substernal chest pain. EKG with anterior ST elevation. Admitted to cardiology service due to STEMI. S/p LHC and stenting. Patient has ongoing chest wound. Plastic surgery was contacted given his nonhealing chest wound that has been going on for years. Plastic surgery has seen on 11/15, will coordinate with general surgery for surgery to be performed at a later date after current episode is resolved. Patient is status post biopsy for concern for skin cancer. Pathology came back positive for basal cell carcinoma. General surgery has discussed with patient on  and that patient will need to follow-up in office to talk about wide excision. Plastic surgery will evaluate him as well as most likely he will need a flap or skin graft in order to close the area of excision. Continue with daily dressing changes. Wound culture grew Pseudomonas and Staph. ID was consulted. Pt started on Vanc/Cipro. Has been having severe epistaxis during hospital course. Rapid response was called  due to this episode. Patient was in respiratory distress during episodes so pulmonology was consulted as well. ENT was consulted and patient had bilateral Rhino Rockets placed.   ENT recommended for right sided Rhino Rocket can be removed  and left-sided Rhino Rocket can be removed 11/18 or 11/19. Patient needs to remain hospitalized while bilateral Rhino Rocket's are in place. Subjective & 24hr Events (11/18/22): Patient examined at bedside. No acute overnight events. Rhino rockets removed and no more episodes of nose bleeding. Denies chest pain or shortness of breath. No difficulty swallowing. 10 point ROS negative except for HPI above. Assessment & Plan:       STEMI  S/p LHC on 11/12 with REBA placement  Cardiology primary  - agree that patient needs to remain on DAPT   Cont ARB, BB    Chest wall wound  Pseudomonas aeruginosa infection  Chest wound cultures: Pseudomonas and Staph  - cipro and doxycycline regimen upon discharge, per ID    Basal cell carcinoma  Skin biopsy with surgery on 11/14--basal cell carcinoma  Plastic surgery has seen on 11/15, will coordinate with general surgery for surgery to be performed at a later date after current episode is resolved. Needs smoking cessation for reconstructive options  Cont daily dressings with Xeroform    Tobacco abuse  Marijuana abuse  Cessation discussed  Nicotine patch prn    Epistaxis  Unable to hold DAPT d/t recent STEMI  Rhino rockets removed with no current epistaxis  - needs ENT follow-up  Monitor Hgb, transfuse if Hgb <8 or if symptomatic d/t CAD         Anticipated discharge needs:     Anticipate discharge home today per primary. Hospitalist will sign off. Please call or page with questions or concerns. Discussed above with patient and with wife at bedside. All questions answered. Diet:  ADULT DIET; Regular  DVT PPx: SCD's  Code status: Full Code    Hospital Problems:  Principal Problem:    ST elevation myocardial infarction involving left anterior descending (LAD) coronary artery (HCC)  Active Problems:    Tobacco abuse    Marijuana abuse    Epistaxis    Basal cell carcinoma    Pseudomonas aeruginosa infection  Resolved Problems:    * No resolved hospital problems. *      Objective:   Patient Vitals for the past 24 hrs:   Temp Pulse Resp BP SpO2   11/18/22 1300 -- -- -- (!) 110/55 --   11/18/22 1231 97.5 °F (36.4 °C) 75 18 (!) 95/51 98 %   11/18/22 0806 -- 80 -- 130/63 --   11/18/22 0728 97.5 °F (36.4 °C) 81 17 132/69 97 %   11/18/22 0454 98.2 °F (36.8 °C) 88 17 (!) 143/68 95 %   11/18/22 0017 98.2 °F (36.8 °C) 88 19 (!) 146/68 97 %   11/17/22 2109 98.6 °F (37 °C) 95 19 (!) 150/72 99 %   11/17/22 1634 98.1 °F (36.7 °C) 97 19 138/75 99 %         Oxygen Therapy  SpO2: 98 %  Pulse Oximetry Type: Continuous  Pulse via Oximetry: 68 beats per minute  SPO2 High Alarm Limit: 100  SPO2 Low Alarm Limit POX: 89  Pulse Oximeter Device Mode: Continuous  Pulse Oximeter Device Location: Right, Finger  O2 Device: None (Room air)  Oximetry Probe Site Changed: No  Skin Assessment: Clean, dry, & intact  Skin Protection for O2 Device: N/A  O2 Flow Rate (L/min): 2 L/min  Oxygen Therapy: None (Room air)  O2 Delivery Method: Nasal cannula    Estimated body mass index is 24.06 kg/m² as calculated from the following:    Height as of this encounter: 5' 9\" (1.753 m). Weight as of this encounter: 162 lb 14.7 oz (73.9 kg). Intake/Output Summary (Last 24 hours) at 11/18/2022 1622  Last data filed at 11/18/2022 1215  Gross per 24 hour   Intake 630 ml   Output 1050 ml   Net -420 ml           Physical Exam:     Blood pressure (!) 110/55, pulse 75, temperature 97.5 °F (36.4 °C), temperature source Oral, resp. rate 18, height 5' 9\" (1.753 m), weight 162 lb 14.7 oz (73.9 kg), SpO2 98 %. General:    Well nourished. Head:  Normocephalic, atraumatic  Eyes:  Sclerae appear normal.  Pupils equally round. ENT:  Rhino rockets removed, no active epistaxis  Neck:  No restricted ROM. Trachea midline   CV:   RRR. No jugular venous distension. Lungs:   CTAB. No wheezing, rhonchi, or rales. Symmetric expansion. Abdomen: Bowel sounds present. Soft, nontender, nondistended.   Extremities: No cyanosis or clubbing. No edema  Skin:     Chest wall wound with pic below (11/12/22). Dressing in place C/D/I  Neuro:  CN II-XII grossly intact. Sensation intact. A&Ox3  Psych:  Normal mood and affect. I have personally reviewed labs and tests showing:  Recent Labs:  Recent Results (from the past 48 hour(s))   Hemoglobin and Hematocrit    Collection Time: 11/16/22 11:55 PM   Result Value Ref Range    Hemoglobin 9.3 (L) 13.6 - 17.2 g/dL    Hematocrit 28.6 (L) 41.1 - 50.3 %   Hemoglobin and Hematocrit    Collection Time: 11/17/22  5:28 AM   Result Value Ref Range    Hemoglobin 9.1 (L) 13.6 - 17.2 g/dL    Hematocrit 27.7 (L) 41.1 - 50.3 %   CBC    Collection Time: 11/18/22  5:02 AM   Result Value Ref Range    WBC 24.1 (H) 4.3 - 11.1 K/uL    RBC 2.94 (L) 4.23 - 5.6 M/uL    Hemoglobin 9.2 (L) 13.6 - 17.2 g/dL    Hematocrit 28.9 (L) 41.1 - 50.3 %    MCV 98.3 82 - 102 FL    MCH 31.3 26.1 - 32.9 PG    MCHC 31.8 31.4 - 35.0 g/dL    RDW 13.6 11.9 - 14.6 %    Platelets 292 124 - 971 K/uL    MPV 9.9 9.4 - 12.3 FL    nRBC 0.00 0.0 - 0.2 K/uL   Creatinine    Collection Time: 11/18/22  5:02 AM   Result Value Ref Range    Creatinine 1.20 0.8 - 1.5 MG/DL   Vancomycin Level, Random    Collection Time: 11/18/22  5:02 AM   Result Value Ref Range    Vancomycin Rm 7.5 UG/ML       I have personally reviewed imaging studies showing: Other Studies:  XR CHEST PORTABLE   Final Result   1. Worsening central interstitial infiltrates. The appearance is most   suggestive of evolving pulmonary edema although additional etiologies can have   this appearance. Recommend clinical correlation. XR CHEST PORTABLE   Final Result   Mild pulmonary vascular congestion with edema.              Current Meds:  Current Facility-Administered Medications   Medication Dose Route Frequency    metoprolol succinate (TOPROL XL) extended release tablet 50 mg  50 mg Oral Daily    losartan (COZAAR) tablet 50 mg  50 mg Oral Daily    amLODIPine (NORVASC) tablet 5 mg  5 mg Oral Daily    doxycycline hyclate (VIBRAMYCIN) capsule 100 mg  100 mg Oral 2 times per day    morphine injection 2 mg  2 mg IntraVENous Q3H PRN    hydrALAZINE (APRESOLINE) injection 20 mg  20 mg IntraVENous Once    silver nitrate applicators applicator 1 each  1 each Topical Once    prochlorperazine (COMPAZINE) injection 10 mg  10 mg IntraVENous Q6H PRN    hydrALAZINE (APRESOLINE) injection 20 mg  20 mg IntraVENous Q4H PRN    traMADol (ULTRAM) tablet 50 mg  50 mg Oral Q6H PRN    ciprofloxacin (CIPRO) tablet 750 mg  750 mg Oral 2 times per day    lidocaine 1 % injection 2 mL  2 mL IntraDERmal Once    sodium chloride flush 0.9 % injection 5-40 mL  5-40 mL IntraVENous 2 times per day    sodium chloride flush 0.9 % injection 5-40 mL  5-40 mL IntraVENous PRN    0.9 % sodium chloride infusion   IntraVENous PRN    ondansetron (ZOFRAN-ODT) disintegrating tablet 4 mg  4 mg Oral Q8H PRN    Or    ondansetron (ZOFRAN) injection 4 mg  4 mg IntraVENous Q6H PRN    acetaminophen (TYLENOL) tablet 650 mg  650 mg Oral Q6H PRN    Or    acetaminophen (TYLENOL) suppository 650 mg  650 mg Rectal Q6H PRN    polyethylene glycol (GLYCOLAX) packet 17 g  17 g Oral Daily PRN    atorvastatin (LIPITOR) tablet 80 mg  80 mg Oral Nightly    potassium chloride (KLOR-CON M) extended release tablet 40 mEq  40 mEq Oral PRN    Or    potassium bicarb-citric acid (EFFER-K) effervescent tablet 40 mEq  40 mEq Oral PRN    Or    potassium chloride 10 mEq/100 mL IVPB (Peripheral Line)  10 mEq IntraVENous PRN    magnesium sulfate 2000 mg in 50 mL IVPB premix  2,000 mg IntraVENous PRN    aluminum & magnesium hydroxide-simethicone (MAALOX) 200-200-20 MG/5ML suspension 30 mL  30 mL Oral Q6H PRN    nicotine (NICODERM CQ) 14 MG/24HR 1 patch  1 patch TransDERmal Daily    aspirin chewable tablet 81 mg  81 mg Oral Daily    clopidogrel (PLAVIX) tablet 75 mg  75 mg Oral Daily    nitroGLYCERIN (NITROSTAT) SL tablet 0.4 mg  0.4 mg SubLINGual Q5 Min PRN    sodium hypochlorite (DAKINS) 0.125 % external solution   Irrigation BID       Signed:  JIM CRONIN DO    Part of this note may have been written by using a voice dictation software. The note has been proof read but may still contain some grammatical/other typographical errors.

## 2022-11-20 LAB
BACTERIA SPEC CULT: NORMAL
BACTERIA SPEC CULT: NORMAL
SERVICE CMNT-IMP: NORMAL
SERVICE CMNT-IMP: NORMAL

## 2022-11-21 ENCOUNTER — TELEPHONE (OUTPATIENT)
Dept: INTERNAL MEDICINE CLINIC | Facility: CLINIC | Age: 72
End: 2022-11-21

## 2022-11-21 ENCOUNTER — TELEPHONE (OUTPATIENT)
Dept: CARDIOLOGY CLINIC | Age: 72
End: 2022-11-21

## 2022-11-21 NOTE — TELEPHONE ENCOUNTER
Called patient to schedule TCV appt following discharge from [facility] [place] 11/18/22.   Dx Epistaxis [R04.0]  STEMI (ST elevation myocardial infarction) (HCC) [I21.3]  ST elevation myocardial infarction (STEMI), unspecified artery ([facility] [address] [I21.3]  ACS (acute coronary syndrome) ([facility] [address] [I24.9]    Attempted to contact to contact patient line busy

## 2022-11-23 NOTE — TELEPHONE ENCOUNTER
2nd attempt to contact patient Number listed no answer. 3rd attempt EC number  . Patient's spouse states she is at work but will call back to schedule appts.

## 2022-11-25 ENCOUNTER — TELEPHONE (OUTPATIENT)
Dept: INTERNAL MEDICINE CLINIC | Facility: CLINIC | Age: 72
End: 2022-11-25

## 2022-11-25 NOTE — TELEPHONE ENCOUNTER
Patient's spouse returned call and scheduled patient with PCP per discharge in 4- 6  weeks. Appt scheduled with Dr Carla Irving 12/12/2022.

## 2022-12-02 ENCOUNTER — TELEPHONE (OUTPATIENT)
Dept: CARDIOLOGY CLINIC | Age: 72
End: 2022-12-02

## 2022-12-02 NOTE — TELEPHONE ENCOUNTER
Pt came into [facility] office didn't have an appt here it was in [facility] pt states he needs refills on all his medications listed in the chart and would like them sent to [facility] in [place]

## 2022-12-06 ENCOUNTER — OFFICE VISIT (OUTPATIENT)
Dept: CARDIOLOGY CLINIC | Age: 72
End: 2022-12-06
Payer: MEDICARE

## 2022-12-06 VITALS
HEIGHT: 69 IN | HEART RATE: 78 BPM | DIASTOLIC BLOOD PRESSURE: 80 MMHG | SYSTOLIC BLOOD PRESSURE: 142 MMHG | BODY MASS INDEX: 25.18 KG/M2 | WEIGHT: 170 LBS

## 2022-12-06 DIAGNOSIS — I25.10 CORONARY ARTERY DISEASE INVOLVING NATIVE CORONARY ARTERY OF NATIVE HEART WITHOUT ANGINA PECTORIS: ICD-10-CM

## 2022-12-06 DIAGNOSIS — I10 PRIMARY HYPERTENSION: ICD-10-CM

## 2022-12-06 DIAGNOSIS — Z72.0 TOBACCO ABUSE: Chronic | ICD-10-CM

## 2022-12-06 DIAGNOSIS — Z95.820 S/P ANGIOPLASTY WITH STENT: ICD-10-CM

## 2022-12-06 DIAGNOSIS — I21.02 ST ELEVATION MYOCARDIAL INFARCTION INVOLVING LEFT ANTERIOR DESCENDING (LAD) CORONARY ARTERY (HCC): Primary | Chronic | ICD-10-CM

## 2022-12-06 DIAGNOSIS — R04.0 EPISTAXIS: ICD-10-CM

## 2022-12-06 PROCEDURE — 3074F SYST BP LT 130 MM HG: CPT | Performed by: INTERNAL MEDICINE

## 2022-12-06 PROCEDURE — 99214 OFFICE O/P EST MOD 30 MIN: CPT | Performed by: INTERNAL MEDICINE

## 2022-12-06 PROCEDURE — 3078F DIAST BP <80 MM HG: CPT | Performed by: INTERNAL MEDICINE

## 2022-12-06 PROCEDURE — 1123F ACP DISCUSS/DSCN MKR DOCD: CPT | Performed by: INTERNAL MEDICINE

## 2022-12-06 ASSESSMENT — ENCOUNTER SYMPTOMS: SHORTNESS OF BREATH: 0

## 2022-12-06 NOTE — PROGRESS NOTES
[address], [address] [facility], [address] [place] [address]  PHONE: [phone]    Chad Lam  1950      SUBJECTIVE:   Chad Lam is a 74 y.o. male seen for a follow up visit regarding the following:     Chief Complaint   Patient presents with    Hypertension       HPI:    68-year-old male comes in here for reason for transitional care visit although he is not had any contact since he got discharged from [facility] he presented there with epistaxis but then while he was there had an acute anterior infarction or thrombotic lesion LAD had that stented apparently by the note he had some moderate severe disease in the circumflex vessels which was left alone. He had some mild anterior wall motion abnormality echo. He was sent home on hypertensive meds including Plavix and aspirin he said he only got 2-week supply but has been out of it not taking anything we did not bring anything with him. He denies any chest pain but still smoking cigarettes. Past Medical History, Past Surgical History, Family history, Social History, and Medications were all reviewed with the patient today and updated as necessary. Allergies   Allergen Reactions    Diphenhydramine      Other reaction(s): Joint swelling-Intolerance    Codeine Rash     No past medical history on file. Past Surgical History:   Procedure Laterality Date    CARDIAC PROCEDURE N/A 11/12/2022    Coronary angiography performed by Luis A Ferguson MD at [facility] CATH LAB    CARDIAC PROCEDURE N/A 11/12/2022    Percutaneous coronary intervention performed by Luis A Ferguson MD at [facility] LAB     No family history on file. Social History     Tobacco Use    Smoking status: Every Day     Packs/day: 1.00     Types: Cigarettes    Smokeless tobacco: Never   Substance Use Topics    Alcohol use: Not on file       ROS:    Review of Systems   Constitutional: Negative for decreased appetite. HENT:  Negative for nosebleeds.     Cardiovascular: Negative for chest pain, dyspnea on exertion, leg swelling and palpitations. Respiratory:  Negative for shortness of breath. PHYSICAL EXAM:    BP (!) 142/80   Pulse 78   Ht 5' 9\" (1.753 m)   Wt 170 lb (77.1 kg)   BMI 25.10 kg/m²        Wt Readings from Last 3 Encounters:   12/06/22 170 lb (77.1 kg)   11/18/22 162 lb 14.7 oz (73.9 kg)     BP Readings from Last 3 Encounters:   12/06/22 (!) 142/80   11/18/22 111/63         Physical Exam  Constitutional:       General: He is not in acute distress. Cardiovascular:      Rate and Rhythm: Normal rate and regular rhythm. Pulses: Normal pulses. Heart sounds: Normal heart sounds. Pulmonary:      Effort: Pulmonary effort is normal. No respiratory distress. Breath sounds: Normal breath sounds. Musculoskeletal:         General: No swelling. Neurological:      General: No focal deficit present. Mental Status: He is alert. Medical problems and test results were reviewed with the patient today. No results found for any visits on 12/06/22. Lab Results   Component Value Date/Time     11/15/2022 05:11 AM    K 4.0 11/15/2022 05:11 AM     11/15/2022 05:11 AM    CO2 21 11/15/2022 05:11 AM    BUN 22 11/15/2022 05:11 AM     Lab Results   Component Value Date/Time    CHOL 146 11/13/2022 04:52 AM    HDL 27 11/13/2022 04:52 AM     I reviewed records at [address] [place] [address] and occluded LAD which was opened. ASSESSMENT and PLAN    Helen Zuleta was seen today for hypertension. Diagnoses and all orders for this visit:    ST elevation myocardial infarction involving left anterior descending (LAD) coronary artery ([facility] [address] patient had acute anterior function acutely opened and stented. He is not taking any aspirin or Plavix right now. He says he only got 2 weeks of medicines was like to me at discharge he was written for 90-day supply.   We called the pharmacist and they definitely indeed have his meds there and we will get him prescriptions for him.  I told him he cannot go without the aspirin Plavix and I gave him a bottle of aspirin to take. He does go to get the medicines today    Coronary artery disease involving native coronary artery of native heart without angina pectoris he is not have any active chest pain although apparently has some residual disease in circumflex vessel    Epistaxis he has had no further nosebleeds     Tobacco abuse he continues to smoke cigarettes encouraged him to stop    Primary hypertension he is not on any medications. They discharged him on some Norvasc losartan and metoprolol which were prescribed at discharge    S/P angioplasty with stent she had a LAD stent also he needs to be compliant with aspirin and Plavix especially before he has further stents in the circumflex vessels. He will come back to see Dr. Kalyn Smith you I think admitted him in a month and see how he is doing make sure he is compliant    [unfilled]      No follow-up provider specified.     Hadley Lincoln MD  12/6/2022  12:14 PM

## 2022-12-19 ENCOUNTER — TELEPHONE (OUTPATIENT)
Dept: CARDIOLOGY CLINIC | Age: 72
End: 2022-12-19

## 2022-12-19 NOTE — TELEPHONE ENCOUNTER
Patients wife called stating her  is needing an 'Employment Cardiac Clearance Letter'. Patients wife states she would like to pick this up at the [facility] office. Please call and advise with questions or when it is available at the Check In desk.

## 2022-12-20 NOTE — TELEPHONE ENCOUNTER
I met the patient once in the transitional care visit but he is not taking his meds his blood pressure often was uncontrolled.   He is not quite ready to go back to work will come to work does he do and he should be have an appointment to follow one of the guys back in the office who can decide this

## 2022-12-22 ENCOUNTER — OFFICE VISIT (OUTPATIENT)
Dept: SURGERY | Age: 72
End: 2022-12-22
Payer: MEDICARE

## 2022-12-22 VITALS — BODY MASS INDEX: 25.18 KG/M2 | HEIGHT: 69 IN | WEIGHT: 170 LBS

## 2022-12-22 DIAGNOSIS — C44.519 BASAL CELL CARCINOMA (BCC) OF SKIN OF OTHER PART OF TORSO: Primary | ICD-10-CM

## 2022-12-22 PROCEDURE — 4004F PT TOBACCO SCREEN RCVD TLK: CPT | Performed by: STUDENT IN AN ORGANIZED HEALTH CARE EDUCATION/TRAINING PROGRAM

## 2022-12-22 PROCEDURE — G8428 CUR MEDS NOT DOCUMENT: HCPCS | Performed by: STUDENT IN AN ORGANIZED HEALTH CARE EDUCATION/TRAINING PROGRAM

## 2022-12-22 PROCEDURE — 3017F COLORECTAL CA SCREEN DOC REV: CPT | Performed by: STUDENT IN AN ORGANIZED HEALTH CARE EDUCATION/TRAINING PROGRAM

## 2022-12-22 PROCEDURE — 1123F ACP DISCUSS/DSCN MKR DOCD: CPT | Performed by: STUDENT IN AN ORGANIZED HEALTH CARE EDUCATION/TRAINING PROGRAM

## 2022-12-22 PROCEDURE — G8484 FLU IMMUNIZE NO ADMIN: HCPCS | Performed by: STUDENT IN AN ORGANIZED HEALTH CARE EDUCATION/TRAINING PROGRAM

## 2022-12-22 PROCEDURE — G8417 CALC BMI ABV UP PARAM F/U: HCPCS | Performed by: STUDENT IN AN ORGANIZED HEALTH CARE EDUCATION/TRAINING PROGRAM

## 2022-12-22 PROCEDURE — 99214 OFFICE O/P EST MOD 30 MIN: CPT | Performed by: STUDENT IN AN ORGANIZED HEALTH CARE EDUCATION/TRAINING PROGRAM

## 2022-12-22 RX ORDER — SODIUM HYPOCHLORITE 1.25 MG/ML
SOLUTION TOPICAL DAILY
Qty: 473 ML | Refills: 0 | Status: SHIPPED | OUTPATIENT
Start: 2022-12-22

## 2022-12-22 NOTE — PROGRESS NOTES
General Surgery New Patient Office Note:    12/22/2022    Shoaib Miller  MRN: [id number]      CHIEF COMPLAINT: Basal cell carcinoma    PRIMARY CARE PHYSICIAN: None Provider    HISTORY: Patient presents status post hospitalization to discuss surgical excision of his basal cell carcinoma of his chest wall. Patient is on aspirin and plavix for his recent stent. Patient states that the area has been going bilaterally well. He has been utilizing the [employer]    REVIEW OF SYSTEMS: 10 Point ROS negative except what is in HPI      No past medical history on file. Current Outpatient Medications   Medication Sig Dispense Refill    sodium hypochlorite (DAKINS) 0.125 % SOLN external solution Apply topically daily 473 mL 0    amLODIPine (NORVASC) 5 MG tablet Take 1 tablet by mouth daily 90 tablet 3    aspirin 81 MG chewable tablet Take 1 tablet by mouth daily 30 tablet 3    atorvastatin (LIPITOR) 80 MG tablet Take 1 tablet by mouth nightly 90 tablet 3    clopidogrel (PLAVIX) 75 MG tablet Take 1 tablet by mouth daily 90 tablet 3    losartan (COZAAR) 50 MG tablet Take 1 tablet by mouth daily 90 tablet 3    metoprolol succinate (TOPROL XL) 50 MG extended release tablet Take 1 tablet by mouth daily 90 tablet 3    nicotine (NICODERM CQ) 14 MG/24HR Place 1 patch onto the skin daily 30 patch 3    nitroGLYCERIN (NITROSTAT) 0.4 MG SL tablet Place 1 tablet under the tongue every 5 minutes as needed for Chest pain up to max of 3 total doses. If no relief after 1 dose, call 911. 25 tablet 3     No current facility-administered medications for this visit. No family history on file.     Social History     Socioeconomic History    Marital status: Single     Spouse name: None    Number of children: None    Years of education: None    Highest education level: None   Tobacco Use    Smoking status: Every Day     Packs/day: 1.00     Types: Cigarettes    Smokeless tobacco: Never         PHYSICAL EXAMINATION:    Ht 5' 9\" (1.753 m)   Wt 170 lb (77.1 kg)   BMI 25.10 kg/m²     General Appearance AOx3, in no acute distress  Eyes Conjunctivae/corneas clear. PERRL, EOM's intact. No scleral icterus  Ears, Nose, Throat ENT exam normal, no neck nodes or sinus tenderness  Neck supple, no significant adenopathy. No notable JVD  Respiratory No chest wall deformities or tenderness, respiratory effort normal, no use of accessory muscles. Cardiovascular RRR. No chest wall tenderness. Gastrointestinal Abdomen soft, nontender, nondistended. BS x4. No rebound, guarding, or rigidity present. No palpable masses. No CVA tenderness  Lymphatics No palpable lymphadenopathy, no hepatosplenomegaly  Musculoskeletal No joint tenderness, deformity or swelling. Full ROM UE/LE. Distal pulses intact UE/LE. No edema, cyanosis, or venous stasis changes. Skin Normal coloration and turgor, no rashes, large fungating chest wound approximately 7 x 7 cm  Neurological alert, oriented, normal speech, no focal findings or movement disorder noted, CN II-XII intact  Psychiatric Alert and oriented, appropriate affect      STUDIES: None    IMPRESSION:  Basal cell carcinoma    PLAN:  We will plan for wide excision of his basal cell carcinoma. I am going to do this with Dr. Juhi Tompkins of plastic surgery as he will likely need a flap or skin graft. He does have recent stents and therefore I will reach out to cardiology in regards to how we would like to manage this and whether or not he is stable for surgery. Risks of surgery discussed with pt and/or family present include risks of anesthesia (cardiopulmonary complications), MI, CVA, DVT,pain, bleeding, infection, injury to local viscera, wound problems, conversion to open procedure if laparoscopic procedure planned, and incomplete symptom resolution. They understand and agree to proceed.

## 2022-12-22 NOTE — Clinical Note
Saintclair Magnuson, Dr. Elida Flatness and I need to take off this patients skin cancer and do either a flap or skin graft. What are your thoughts on his blood thinners with his new stent? Should we put him in the hospital before?

## 2022-12-23 ENCOUNTER — TELEPHONE (OUTPATIENT)
Dept: SURGERY | Age: 72
End: 2022-12-23

## 2023-01-11 DIAGNOSIS — C44.519 BASAL CELL CARCINOMA (BCC) OF SKIN OF OTHER PART OF TORSO: Primary | ICD-10-CM

## 2023-02-02 ENCOUNTER — TELEPHONE (OUTPATIENT)
Dept: CARDIOLOGY CLINIC | Age: 73
End: 2023-02-02

## 2023-02-02 ENCOUNTER — TELEPHONE (OUTPATIENT)
Dept: FAMILY MEDICINE CLINIC | Facility: CLINIC | Age: 73
End: 2023-02-02

## 2023-02-02 RX ORDER — FUROSEMIDE 40 MG/1
40 TABLET ORAL DAILY
Qty: 10 TABLET | Refills: 0 | Status: SHIPPED | OUTPATIENT
Start: 2023-02-02

## 2023-02-02 NOTE — TELEPHONE ENCOUNTER
I called the wife she said that pt.was hospitalized in [facility] in Dec.for heart failure. The doctor there (hospitalist)gave him Lasix 40mg qday at discharge. He has ran out. He has a new PCP he sees for the first time next week. He was formal   pt. He is scheduled to see  3/13/23. He has ran out of Lasix. Can we at least give pt.enough Lasix to get him through until he can see the new PCP next week?

## 2023-02-02 NOTE — TELEPHONE ENCOUNTER
approved Lasix until pt.can see PCP. I could not get through to pt. I escribed med as below  Requested Prescriptions     Signed Prescriptions Disp Refills    furosemide (LASIX) 40 MG tablet 10 tablet 0     Sig: Take 1 tablet by mouth daily     Authorizing Provider: Holli Prabhakar     Ordering User: Yamel Mistry     Wife called back was notified of prescription and v/u.

## 2023-02-02 NOTE — TELEPHONE ENCOUNTER
Patients wife LVM stating patient is scheduled with our office for the 9th. Reports patient being a \"heart patient\" and requesting return call. No other information given. Attempted to contact wife twice at number listed; reached message stating \"the subscriber you are trying to reach is not in service. \"

## 2023-02-02 NOTE — TELEPHONE ENCOUNTER
Pt's wife called on behalf of pt to get refills on a Laxis RX prescibed by from Dr. Genao in [facility]. After explaing that we could not fill the pt hung up. Had an chas scheduled as the pt was with Dr. Irwin.

## 2023-02-07 RX ORDER — METOPROLOL SUCCINATE 50 MG/1
50 TABLET, EXTENDED RELEASE ORAL DAILY
Qty: 90 TABLET | Refills: 1 | Status: SHIPPED | OUTPATIENT
Start: 2023-02-07

## 2023-02-07 NOTE — TELEPHONE ENCOUNTER
Former  pt. Has an appt. to establish care w/ on 3/13/23 Ask for refill on Toprol xl 50mg. Med pended for refill approval as below:  Requested Prescriptions     Pending Prescriptions Disp Refills    metoprolol succinate (TOPROL XL) 50 MG extended release tablet 90 tablet 1     Sig: Take 1 tablet by mouth daily

## 2023-02-07 NOTE — TELEPHONE ENCOUNTER
MEDICATION REFILL REQUEST    Pt is out of med      Name of Medication:  Lasix  Dose:  40 mg  Frequency:  one a day  Quantity:     Days' supply:  60          Pharmacy Name/Location:  [facility] in [facility]

## 2023-02-09 ENCOUNTER — TELEPHONE (OUTPATIENT)
Dept: CARDIOLOGY CLINIC | Age: 73
End: 2023-02-09

## 2023-02-09 RX ORDER — FUROSEMIDE 40 MG/1
40 TABLET ORAL DAILY
Qty: 90 TABLET | Refills: 3 | Status: SHIPPED | OUTPATIENT
Start: 2023-02-09 | End: 2023-02-11 | Stop reason: SDUPTHER

## 2023-02-09 NOTE — TELEPHONE ENCOUNTER
Requested Prescriptions     Signed Prescriptions Disp Refills    furosemide (LASIX) 40 MG tablet 90 tablet 3     Sig: Take 1 tablet by mouth daily     Authorizing Provider: Kitty Hart     Ordering User: Angélica SAMAYOA     Lasix 40 mg qd, as above, E-prescribed to [facility] in [place].

## 2023-02-09 NOTE — TELEPHONE ENCOUNTER
From 2/2/23 triage note:  Jose Alfredo Larsen RN     [facility]    4:09 PM  Note        approved Lasix until pt.can see PCP. I could not get through to pt. I escribed med as below  Requested Prescriptions             Signed Prescriptions Disp Refills    furosemide (LASIX) 40 MG tablet 10 tablet 0       Sig: Take 1 tablet by mouth daily       Authorizing Provider: Chula Steinberg       Ordering User: Marlee Brown      Wife called back was notified of prescription and v/u.

## 2023-02-09 NOTE — TELEPHONE ENCOUNTER
See 2/2/23 telephone encounter. Patient was given lasix when hospitalized in [facility] 12/2022, and was given 10 days of lasix to get him through to appointment with new PCP the next week. Patient had to go out of town and did not get to go to PCP appointment. Patient is now scheduled to see new PCP on 2/21/23. Only has 5 tablets of lasix, left. Patient is scheduled to see Dr. Montana Cramre 3/13/23. Patient requests 90 day refill of lasix.

## 2023-02-09 NOTE — TELEPHONE ENCOUNTER
MEDICATION REFILL REQUEST      Name of Medication:  lasix  Dose:  40 mg  Frequency:  daily   Quantity:    Days' supply:  90      Pharmacy Name/Location:  did not leave Patient/Caregiver provided printed discharge information.

## 2023-02-10 NOTE — TELEPHONE ENCOUNTER
MEDICATION REFILL REQUEST      Name of Medication:  lasix  Dose:  40 mg  Frequency:    Quantity:    Days' supply:  80      Pharmacy Name/Location:  [facility]/ Please call in today and if a problem, Please call wife Charlane Kanner

## 2023-02-10 NOTE — TELEPHONE ENCOUNTER
Advised wife, Hollie Rebollar, that I will ask Dr. oM Hummel sign lasix 40 mg refill when he returns to office on Monday. Hollie Smooth verbalized understanding, and states patient has enough lasix for a few days.   Requested Prescriptions     Pending Prescriptions Disp Refills    furosemide (LASIX) 40 MG tablet 90 tablet 3     Sig: Take 1 tablet by mouth daily     Pended lasix 40 mg qd refill, as above, sent to Dr. Mo Hummel for approval.

## 2023-02-11 RX ORDER — FUROSEMIDE 40 MG/1
40 TABLET ORAL DAILY
Qty: 90 TABLET | Refills: 3 | Status: SHIPPED | OUTPATIENT
Start: 2023-02-11

## 2023-02-13 RX ORDER — FUROSEMIDE 40 MG/1
40 TABLET ORAL DAILY
Qty: 90 TABLET | Refills: 3 | Status: SHIPPED | OUTPATIENT
Start: 2023-02-13

## 2023-02-13 NOTE — TELEPHONE ENCOUNTER
Requested Prescriptions     Pending Prescriptions Disp Refills    furosemide (LASIX) 40 MG tablet 90 tablet 3     Sig: Take 1 tablet by mouth daily     Pended lasix Rx sent to Dr. Carlos Whitt for approval.

## 2023-02-13 NOTE — TELEPHONE ENCOUNTER
MEDICATION REFILL REQUEST      Name of Medication:  Lasix  Dose:  40 mg  Frequency:    Quantity:    Days' supply:  80      Pharmacy Name/Location:  did not leave

## 2023-02-21 ENCOUNTER — OFFICE VISIT (OUTPATIENT)
Dept: FAMILY MEDICINE CLINIC | Facility: CLINIC | Age: 73
End: 2023-02-21
Payer: MEDICARE

## 2023-02-21 VITALS
WEIGHT: 174.8 LBS | HEART RATE: 66 BPM | BODY MASS INDEX: 26.49 KG/M2 | OXYGEN SATURATION: 99 % | DIASTOLIC BLOOD PRESSURE: 70 MMHG | HEIGHT: 68 IN | SYSTOLIC BLOOD PRESSURE: 126 MMHG

## 2023-02-21 DIAGNOSIS — I25.10 CORONARY ARTERY DISEASE INVOLVING NATIVE CORONARY ARTERY OF NATIVE HEART WITHOUT ANGINA PECTORIS: ICD-10-CM

## 2023-02-21 DIAGNOSIS — C44.91 BASAL CELL CARCINOMA (BCC), UNSPECIFIED SITE: Primary | ICD-10-CM

## 2023-02-21 DIAGNOSIS — Z95.5 STATUS POST CORONARY ARTERY STENT PLACEMENT: ICD-10-CM

## 2023-02-21 DIAGNOSIS — I25.5 ISCHEMIC CARDIOMYOPATHY: ICD-10-CM

## 2023-02-21 DIAGNOSIS — F17.210 SMOKING GREATER THAN 40 PACK YEARS: ICD-10-CM

## 2023-02-21 DIAGNOSIS — I21.02 ST ELEVATION MYOCARDIAL INFARCTION INVOLVING LEFT ANTERIOR DESCENDING (LAD) CORONARY ARTERY (HCC): ICD-10-CM

## 2023-02-21 DIAGNOSIS — Z72.0 TOBACCO ABUSE: ICD-10-CM

## 2023-02-21 PROBLEM — J96.01 ACUTE RESPIRATORY FAILURE WITH HYPOXIA (HCC): Status: ACTIVE | Noted: 2022-12-28

## 2023-02-21 PROBLEM — I50.31 ACUTE HEART FAILURE WITH PRESERVED EJECTION FRACTION (HCC): Status: ACTIVE | Noted: 2022-12-28

## 2023-02-21 PROBLEM — F12.90 MARIJUANA USE: Status: ACTIVE | Noted: 2022-12-28

## 2023-02-21 PROBLEM — E78.5 HLD (HYPERLIPIDEMIA): Status: ACTIVE | Noted: 2022-12-28

## 2023-02-21 PROCEDURE — 3074F SYST BP LT 130 MM HG: CPT | Performed by: FAMILY MEDICINE

## 2023-02-21 PROCEDURE — 1123F ACP DISCUSS/DSCN MKR DOCD: CPT | Performed by: FAMILY MEDICINE

## 2023-02-21 PROCEDURE — 99204 OFFICE O/P NEW MOD 45 MIN: CPT | Performed by: FAMILY MEDICINE

## 2023-02-21 PROCEDURE — 3078F DIAST BP <80 MM HG: CPT | Performed by: FAMILY MEDICINE

## 2023-02-21 RX ORDER — ALBUTEROL SULFATE 90 UG/1
2 AEROSOL, METERED RESPIRATORY (INHALATION) EVERY 6 HOURS PRN
Qty: 18 G | Refills: 5 | Status: SHIPPED | OUTPATIENT
Start: 2023-02-21

## 2023-02-21 RX ORDER — NICOTINE 21 MG/24HR
1 PATCH, TRANSDERMAL 24 HOURS TRANSDERMAL DAILY
Qty: 42 PATCH | Refills: 3 | Status: SHIPPED | OUTPATIENT
Start: 2023-02-21 | End: 2023-04-04

## 2023-02-21 RX ORDER — FUROSEMIDE 40 MG/1
40 TABLET ORAL DAILY
Qty: 90 TABLET | Refills: 3 | Status: SHIPPED | OUTPATIENT
Start: 2023-02-21

## 2023-02-21 RX ORDER — ALBUTEROL SULFATE 90 UG/1
2 AEROSOL, METERED RESPIRATORY (INHALATION) EVERY 6 HOURS PRN
COMMUNITY
Start: 2022-12-31 | End: 2023-02-21 | Stop reason: SDUPTHER

## 2023-02-21 SDOH — ECONOMIC STABILITY: INCOME INSECURITY: HOW HARD IS IT FOR YOU TO PAY FOR THE VERY BASICS LIKE FOOD, HOUSING, MEDICAL CARE, AND HEATING?: NOT HARD AT ALL

## 2023-02-21 SDOH — ECONOMIC STABILITY: FOOD INSECURITY: WITHIN THE PAST 12 MONTHS, THE FOOD YOU BOUGHT JUST DIDN'T LAST AND YOU DIDN'T HAVE MONEY TO GET MORE.: NEVER TRUE

## 2023-02-21 SDOH — ECONOMIC STABILITY: HOUSING INSECURITY
IN THE LAST 12 MONTHS, WAS THERE A TIME WHEN YOU DID NOT HAVE A STEADY PLACE TO SLEEP OR SLEPT IN A SHELTER (INCLUDING NOW)?: NO

## 2023-02-21 SDOH — ECONOMIC STABILITY: FOOD INSECURITY: WITHIN THE PAST 12 MONTHS, YOU WORRIED THAT YOUR FOOD WOULD RUN OUT BEFORE YOU GOT MONEY TO BUY MORE.: NEVER TRUE

## 2023-02-21 ASSESSMENT — PATIENT HEALTH QUESTIONNAIRE - PHQ9
2. FEELING DOWN, DEPRESSED OR HOPELESS: 0
SUM OF ALL RESPONSES TO PHQ QUESTIONS 1-9: 0
1. LITTLE INTEREST OR PLEASURE IN DOING THINGS: 0
SUM OF ALL RESPONSES TO PHQ QUESTIONS 1-9: 0
2. FEELING DOWN, DEPRESSED OR HOPELESS: 0
SUM OF ALL RESPONSES TO PHQ QUESTIONS 1-9: 0
SUM OF ALL RESPONSES TO PHQ9 QUESTIONS 1 & 2: 0
SUM OF ALL RESPONSES TO PHQ QUESTIONS 1-9: 0

## 2023-02-21 ASSESSMENT — ENCOUNTER SYMPTOMS: RESPIRATORY NEGATIVE: 1

## 2023-02-22 NOTE — PROGRESS NOTES
PROGRESS NOTE    SUBJECTIVE:   Madhuri Becerra is a 74 y.o. male seen for a follow up visit regarding   Chief Complaint   Patient presents with    New Patient     Establish care          HPI:  New patient, wanting to establish primary care here. Recently had a myocardial infarction. He is placed. He has a mildly to moderately reduced ejection fraction 42%. He presently is on Lasix. He has a follow-up with cardiology next week. Reviewed and updated this visit by provider:           Review of Systems   Respiratory: Negative. Cardiovascular: Negative. OBJECTIVE:  Vitals:    02/21/23 1552   BP: 126/70   Site: Left Upper Arm   Cuff Size: Medium Adult   Pulse: 66   SpO2: 99%   Weight: 174 lb 12.8 oz (79.3 kg)   Height: 5' 8\" (1.727 m)        Physical Exam   General: Alert and oriented x3, well-appearing  Neck: No adenopathy, thyromegaly or thyroid nodules  Pulmonary: Normal effort, good airflow, no rales or rhonchi  CVS: Regular rate and rhythm, normal S1, S2, no S3 or S4, no murmurs; no carotid bruits, 2+ pedal pulses  Skin: Large soft tissue mass on the central sternum. (Previous biopsy shows basal cell carcinoma)    Medical problems and test results were reviewed with the patient today. No results found for this or any previous visit (from the past 672 hour(s)). No results found for any visits on 02/21/23. ASSESSMENT and PLAN    1. Basal cell carcinoma (BCC), unspecified site   -    Patient will be referred to general surgery  2. Smoking greater than 40 pack years   -   We will schedule low-dose CT  3. Tobacco abuse  The following orders have not been finalized:  -     nicotine (NICODERM CQ) 21 MG/24HR  4. ST elevation myocardial infarction involving left anterior descending (LAD) coronary artery ([address]  5. Coronary artery disease involving native coronary artery of native heart without angina pectoris  6.  Status post coronary artery stent placement         Return in about 3 months (around 5/21/2023).        Awilda Teran MD

## 2023-05-11 DIAGNOSIS — E78.5 HYPERLIPIDEMIA, UNSPECIFIED HYPERLIPIDEMIA TYPE: Primary | ICD-10-CM

## 2023-05-11 DIAGNOSIS — I10 PRIMARY HYPERTENSION: ICD-10-CM

## 2023-05-11 DIAGNOSIS — R73.09 ELEVATED HEMOGLOBIN A1C: ICD-10-CM

## 2023-05-11 DIAGNOSIS — Z11.59 ENCOUNTER FOR HEPATITIS C SCREENING TEST FOR LOW RISK PATIENT: ICD-10-CM

## 2023-06-15 ENCOUNTER — TELEPHONE (OUTPATIENT)
Dept: FAMILY MEDICINE CLINIC | Facility: CLINIC | Age: 73
End: 2023-06-15

## 2023-06-15 NOTE — TELEPHONE ENCOUNTER
Patients wife called back to speak with Rabia Lynne did advise she was at lunch and would send her a message

## 2023-06-16 NOTE — TELEPHONE ENCOUNTER
Patients wife contacted. Reports she believes amlodipine is causing intermittent dizziness and lack of energy. Didn't noticed symptoms until starting Amlodipine. Says it doesn't happen every day but happens more times than not. Wanting to know what to do. Please advise.

## 2023-10-05 RX ORDER — AMLODIPINE BESYLATE 5 MG/1
5 TABLET ORAL DAILY
Qty: 90 TABLET | Refills: 3 | OUTPATIENT
Start: 2023-10-05

## 2023-11-02 DIAGNOSIS — E78.5 HYPERLIPIDEMIA, UNSPECIFIED HYPERLIPIDEMIA TYPE: ICD-10-CM

## 2023-11-02 DIAGNOSIS — I10 PRIMARY HYPERTENSION: ICD-10-CM

## 2023-11-02 DIAGNOSIS — I10 PRIMARY HYPERTENSION: Primary | ICD-10-CM

## 2023-11-02 DIAGNOSIS — I25.10 CORONARY ARTERY DISEASE INVOLVING NATIVE CORONARY ARTERY OF NATIVE HEART WITHOUT ANGINA PECTORIS: ICD-10-CM

## 2023-11-02 RX ORDER — AMLODIPINE BESYLATE 5 MG/1
5 TABLET ORAL DAILY
Qty: 30 TABLET | Refills: 0 | Status: SHIPPED | OUTPATIENT
Start: 2023-11-02 | End: 2023-12-02

## 2023-11-02 RX ORDER — LOSARTAN POTASSIUM 50 MG/1
50 TABLET ORAL DAILY
Qty: 30 TABLET | Refills: 0 | Status: SHIPPED | OUTPATIENT
Start: 2023-11-02 | End: 2023-12-02

## 2023-11-02 RX ORDER — CLOPIDOGREL BISULFATE 75 MG/1
75 TABLET ORAL DAILY
Qty: 30 TABLET | Refills: 0 | Status: SHIPPED | OUTPATIENT
Start: 2023-11-02 | End: 2023-12-02

## 2023-11-02 RX ORDER — ATORVASTATIN CALCIUM 80 MG/1
80 TABLET, FILM COATED ORAL NIGHTLY
Qty: 30 TABLET | Refills: 0 | Status: SHIPPED | OUTPATIENT
Start: 2023-11-02 | End: 2023-12-02

## 2023-11-02 NOTE — TELEPHONE ENCOUNTER
LOV 2/21/23. Has appointment scheduled for 11/13/23. Orders pended for a 1 month supply. Please advise.

## 2023-11-02 NOTE — TELEPHONE ENCOUNTER
Patient requesting refill on lisinopril, clopidogrel (PLAVIX) 75 MG tablet, amLODIPine (NORVASC) 5 MG tablet and atorvastatin (LIPITOR) 80 MG tablet to [facility]

## 2023-11-03 RX ORDER — LOSARTAN POTASSIUM 50 MG/1
50 TABLET ORAL DAILY
Qty: 90 TABLET | OUTPATIENT
Start: 2023-11-03 | End: 2023-12-03

## 2023-11-13 ENCOUNTER — OFFICE VISIT (OUTPATIENT)
Dept: FAMILY MEDICINE CLINIC | Facility: CLINIC | Age: 73
End: 2023-11-13
Payer: MEDICARE

## 2023-11-13 VITALS
OXYGEN SATURATION: 99 % | WEIGHT: 183.2 LBS | BODY MASS INDEX: 27.13 KG/M2 | HEART RATE: 63 BPM | SYSTOLIC BLOOD PRESSURE: 126 MMHG | HEIGHT: 69 IN | DIASTOLIC BLOOD PRESSURE: 74 MMHG

## 2023-11-13 DIAGNOSIS — R53.1 WEAKNESS: ICD-10-CM

## 2023-11-13 DIAGNOSIS — E78.5 HYPERLIPIDEMIA, UNSPECIFIED HYPERLIPIDEMIA TYPE: ICD-10-CM

## 2023-11-13 DIAGNOSIS — C44.519 BASAL CELL CARCINOMA (BCC) OF SKIN OF OTHER PART OF TORSO: Primary | ICD-10-CM

## 2023-11-13 DIAGNOSIS — F17.210 SMOKING GREATER THAN 40 PACK YEARS: ICD-10-CM

## 2023-11-13 DIAGNOSIS — E55.9 VITAMIN D DEFICIENCY: ICD-10-CM

## 2023-11-13 DIAGNOSIS — I10 PRIMARY HYPERTENSION: ICD-10-CM

## 2023-11-13 DIAGNOSIS — I25.5 ISCHEMIC CARDIOMYOPATHY: ICD-10-CM

## 2023-11-13 DIAGNOSIS — I25.10 CORONARY ARTERY DISEASE INVOLVING NATIVE CORONARY ARTERY OF NATIVE HEART WITHOUT ANGINA PECTORIS: ICD-10-CM

## 2023-11-13 DIAGNOSIS — F34.1 DYSTHYMIA: ICD-10-CM

## 2023-11-13 PROBLEM — I50.22 CHRONIC SYSTOLIC (CONGESTIVE) HEART FAILURE (HCC): Status: ACTIVE | Noted: 2023-11-13

## 2023-11-13 PROBLEM — I25.119 ATHEROSCLEROTIC HEART DISEASE OF NATIVE CORONARY ARTERY WITH UNSPECIFIED ANGINA PECTORIS (HCC): Status: ACTIVE | Noted: 2023-11-13

## 2023-11-13 LAB
25(OH)D3 SERPL-MCNC: 24.7 NG/ML (ref 30–100)
ALBUMIN SERPL-MCNC: 4 G/DL (ref 3.2–4.6)
ALBUMIN/GLOB SERPL: 1 (ref 0.4–1.6)
ALP SERPL-CCNC: 193 U/L (ref 50–136)
ALT SERPL-CCNC: 24 U/L (ref 12–65)
ANION GAP SERPL CALC-SCNC: 5 MMOL/L (ref 2–11)
AST SERPL-CCNC: 13 U/L (ref 15–37)
BASOPHILS # BLD: 0.1 K/UL (ref 0–0.2)
BASOPHILS NFR BLD: 1 % (ref 0–2)
BILIRUB SERPL-MCNC: 0.6 MG/DL (ref 0.2–1.1)
BUN SERPL-MCNC: 16 MG/DL (ref 8–23)
CALCIUM SERPL-MCNC: 9.6 MG/DL (ref 8.3–10.4)
CHLORIDE SERPL-SCNC: 106 MMOL/L (ref 101–110)
CHOLEST SERPL-MCNC: 137 MG/DL
CO2 SERPL-SCNC: 30 MMOL/L (ref 21–32)
CREAT SERPL-MCNC: 1.4 MG/DL (ref 0.8–1.5)
DIFFERENTIAL METHOD BLD: ABNORMAL
EOSINOPHIL # BLD: 0.3 K/UL (ref 0–0.8)
EOSINOPHIL NFR BLD: 2 % (ref 0.5–7.8)
ERYTHROCYTE [DISTWIDTH] IN BLOOD BY AUTOMATED COUNT: 13.9 % (ref 11.9–14.6)
GLOBULIN SER CALC-MCNC: 4 G/DL (ref 2.8–4.5)
GLUCOSE SERPL-MCNC: 99 MG/DL (ref 65–100)
HCT VFR BLD AUTO: 47.4 % (ref 41.1–50.3)
HDLC SERPL-MCNC: 35 MG/DL (ref 40–60)
HDLC SERPL: 3.9
HGB BLD-MCNC: 15.1 G/DL (ref 13.6–17.2)
IMM GRANULOCYTES # BLD AUTO: 0.1 K/UL (ref 0–0.5)
IMM GRANULOCYTES NFR BLD AUTO: 1 % (ref 0–5)
LDLC SERPL CALC-MCNC: 61.8 MG/DL
LYMPHOCYTES # BLD: 2.2 K/UL (ref 0.5–4.6)
LYMPHOCYTES NFR BLD: 15 % (ref 13–44)
MCH RBC QN AUTO: 29.3 PG (ref 26.1–32.9)
MCHC RBC AUTO-ENTMCNC: 31.9 G/DL (ref 31.4–35)
MCV RBC AUTO: 91.9 FL (ref 82–102)
MONOCYTES # BLD: 1.4 K/UL (ref 0.1–1.3)
MONOCYTES NFR BLD: 10 % (ref 4–12)
NEUTS SEG # BLD: 10.8 K/UL (ref 1.7–8.2)
NEUTS SEG NFR BLD: 71 % (ref 43–78)
NRBC # BLD: 0 K/UL (ref 0–0.2)
PLATELET # BLD AUTO: 450 K/UL (ref 150–450)
PMV BLD AUTO: 9.5 FL (ref 9.4–12.3)
POTASSIUM SERPL-SCNC: 3.5 MMOL/L (ref 3.5–5.1)
PROT SERPL-MCNC: 8 G/DL (ref 6.3–8.2)
RBC # BLD AUTO: 5.16 M/UL (ref 4.23–5.6)
SODIUM SERPL-SCNC: 141 MMOL/L (ref 133–143)
TRIGL SERPL-MCNC: 201 MG/DL (ref 35–150)
TSH, 3RD GENERATION: 1.32 UIU/ML (ref 0.36–3.74)
VIT B12 SERPL-MCNC: 587 PG/ML (ref 193–986)
VLDLC SERPL CALC-MCNC: 40.2 MG/DL (ref 6–23)
WBC # BLD AUTO: 14.8 K/UL (ref 4.3–11.1)

## 2023-11-13 PROCEDURE — G8417 CALC BMI ABV UP PARAM F/U: HCPCS | Performed by: FAMILY MEDICINE

## 2023-11-13 PROCEDURE — 3074F SYST BP LT 130 MM HG: CPT | Performed by: FAMILY MEDICINE

## 2023-11-13 PROCEDURE — 3078F DIAST BP <80 MM HG: CPT | Performed by: FAMILY MEDICINE

## 2023-11-13 PROCEDURE — 99214 OFFICE O/P EST MOD 30 MIN: CPT | Performed by: FAMILY MEDICINE

## 2023-11-13 PROCEDURE — 4004F PT TOBACCO SCREEN RCVD TLK: CPT | Performed by: FAMILY MEDICINE

## 2023-11-13 PROCEDURE — G8427 DOCREV CUR MEDS BY ELIG CLIN: HCPCS | Performed by: FAMILY MEDICINE

## 2023-11-13 PROCEDURE — 1123F ACP DISCUSS/DSCN MKR DOCD: CPT | Performed by: FAMILY MEDICINE

## 2023-11-13 PROCEDURE — 3017F COLORECTAL CA SCREEN DOC REV: CPT | Performed by: FAMILY MEDICINE

## 2023-11-13 PROCEDURE — G8484 FLU IMMUNIZE NO ADMIN: HCPCS | Performed by: FAMILY MEDICINE

## 2023-11-13 PROCEDURE — G0439 PPPS, SUBSEQ VISIT: HCPCS | Performed by: FAMILY MEDICINE

## 2023-11-13 RX ORDER — AMLODIPINE BESYLATE 5 MG/1
5 TABLET ORAL DAILY
Qty: 90 TABLET | Refills: 1 | Status: SHIPPED | OUTPATIENT
Start: 2023-11-13 | End: 2024-05-11

## 2023-11-13 RX ORDER — METOPROLOL SUCCINATE 50 MG/1
50 TABLET, EXTENDED RELEASE ORAL DAILY
Qty: 90 TABLET | Refills: 1 | Status: SHIPPED | OUTPATIENT
Start: 2023-11-13

## 2023-11-13 RX ORDER — LOSARTAN POTASSIUM 50 MG/1
50 TABLET ORAL DAILY
Qty: 90 TABLET | Refills: 1 | Status: SHIPPED | OUTPATIENT
Start: 2023-11-13 | End: 2024-05-11

## 2023-11-13 RX ORDER — ALBUTEROL SULFATE 90 UG/1
2 AEROSOL, METERED RESPIRATORY (INHALATION) EVERY 6 HOURS PRN
Qty: 18 G | Refills: 5 | Status: SHIPPED | OUTPATIENT
Start: 2023-11-13

## 2023-11-13 RX ORDER — ATORVASTATIN CALCIUM 80 MG/1
80 TABLET, FILM COATED ORAL NIGHTLY
Qty: 90 TABLET | Refills: 1 | Status: SHIPPED | OUTPATIENT
Start: 2023-11-13 | End: 2024-05-11

## 2023-11-13 RX ORDER — CLOPIDOGREL BISULFATE 75 MG/1
75 TABLET ORAL DAILY
Qty: 90 TABLET | Refills: 1 | Status: SHIPPED | OUTPATIENT
Start: 2023-11-13 | End: 2024-05-11

## 2023-11-13 RX ORDER — DULOXETIN HYDROCHLORIDE 30 MG/1
30 CAPSULE, DELAYED RELEASE ORAL DAILY
Qty: 30 CAPSULE | Refills: 5 | Status: SHIPPED | OUTPATIENT
Start: 2023-11-13 | End: 2024-05-11

## 2023-11-13 ASSESSMENT — PATIENT HEALTH QUESTIONNAIRE - PHQ9
SUM OF ALL RESPONSES TO PHQ QUESTIONS 1-9: 0
SUM OF ALL RESPONSES TO PHQ9 QUESTIONS 1 & 2: 0
SUM OF ALL RESPONSES TO PHQ QUESTIONS 1-9: 0
1. LITTLE INTEREST OR PLEASURE IN DOING THINGS: 0
SUM OF ALL RESPONSES TO PHQ QUESTIONS 1-9: 0
2. FEELING DOWN, DEPRESSED OR HOPELESS: 0
SUM OF ALL RESPONSES TO PHQ QUESTIONS 1-9: 0

## 2023-11-13 ASSESSMENT — LIFESTYLE VARIABLES
HOW MANY STANDARD DRINKS CONTAINING ALCOHOL DO YOU HAVE ON A TYPICAL DAY: PATIENT DOES NOT DRINK
HOW OFTEN DO YOU HAVE A DRINK CONTAINING ALCOHOL: NEVER

## 2023-11-13 ASSESSMENT — ENCOUNTER SYMPTOMS: RESPIRATORY NEGATIVE: 1

## 2023-11-13 NOTE — PROGRESS NOTES
2D Simpsons Biplane is 42%. Left ventricle size is normal. Mildly increased wall thickness. Mid to distal anterior/anteroseptal wall hypokinesis. Abnormal diastolic function. Average E/e' ratio is 17.02.    Right Ventricle: Right ventricle size is normal. Normal systolic function. Aortic Valve: Tricuspid valve. Moderately calcified noncoronary cusp. Mitral Valve: Mildly thickened leaflet, at the anterior and posterior leaflets. Mild regurgitation. Technical qualifiers: Technically difficult study, color flow Doppler was performed and pulse wave and/or continuous wave Doppler was performed. Signed by: Dean Pond DO on 11/13/2022  7:33 PM, Signed by: Unknown Provider Result on 11/13/2022 12:00 AM      Medical problems and test results were reviewed with the patient today. No results found for this or any previous visit (from the past 672 hour(s)). No results found for any visits on 11/13/23. ASSESSMENT and PLAN    1. Basal cell carcinoma (BCC) of skin of other part of torso  -     [facility]  2. Coronary artery disease involving native coronary artery of native heart without angina pectoris  -     amLODIPine (NORVASC) 5 MG tablet; Take 1 tablet by mouth daily, Disp-90 tablet, R-1Normal  -     clopidogrel (PLAVIX) 75 MG tablet; Take 1 tablet by mouth daily, Disp-90 tablet, R-1Normal  -     losartan (COZAAR) 50 MG tablet; Take 1 tablet by mouth daily, Disp-90 tablet, R-1Normal  -     metoprolol succinate (TOPROL XL) 50 MG extended release tablet; Take 1 tablet by mouth daily, Disp-90 tablet, R-1Normal  -     [facility] - Esperanza Pettit DO, Cardiology, [facility]  -     Comprehensive Metabolic Panel; Future  -     Lipid Panel; Future  3. Ischemic cardiomyopathy  4. Primary hypertension  -     amLODIPine (NORVASC) 5 MG tablet; Take 1 tablet by mouth daily, Disp-90 tablet, R-1Normal  -     losartan (COZAAR) 50 MG tablet;  Take 1 tablet by mouth daily, Disp-90

## 2023-11-30 ENCOUNTER — TELEPHONE (OUTPATIENT)
Dept: FAMILY MEDICINE CLINIC | Facility: CLINIC | Age: 73
End: 2023-11-30

## 2023-11-30 NOTE — TELEPHONE ENCOUNTER
Wife returned call. States she called [facility] in [place] yesterday and asked if they could transfer all of patients Rx's and she was told they could not. Advised wife that I would call [facility] for clarification. If they are needing new Rx's we could send those. Contacted [facility] in [place]; reports needing patient to call pharmacy when they are due a refill and they will be able to pull prescriptions over. Unsure of whom told patients wife they could not do that as that is not the case. LVM on Wife's line with message from pharmacy. Advised to return call if she had any further questions or concerns.

## 2023-11-30 NOTE — TELEPHONE ENCOUNTER
Due to patient being non-English speaking/uses sign language, an  was used for this visit. Only for face-to-face interpretation by an external agency, date and length of interpretation can be found on the scanned worksheet.     name: Demarco Valentin  Agency: [employer]  Language: Con[place]   Telephone number: [phone]  Type of interpretation: Group, spoken; number of participants: 5         Rx sent to pharmacy on 11/13/23 to [facility] in [place]. MYRNA letting patient know if he is wanting to use [facility] in [place] to call that pharmacy and have them transfer the prescriptions. Advised to return call if he had any questions or concerns.

## 2023-12-04 ENCOUNTER — OFFICE VISIT (OUTPATIENT)
Dept: SURGERY | Age: 73
End: 2023-12-04
Payer: MEDICARE

## 2023-12-04 VITALS
HEART RATE: 71 BPM | SYSTOLIC BLOOD PRESSURE: 124 MMHG | DIASTOLIC BLOOD PRESSURE: 68 MMHG | WEIGHT: 184.9 LBS | BODY MASS INDEX: 27.39 KG/M2 | HEIGHT: 69 IN | OXYGEN SATURATION: 98 %

## 2023-12-04 DIAGNOSIS — C44.519 BASAL CELL CARCINOMA (BCC) OF ANTERIOR CHEST: Primary | ICD-10-CM

## 2023-12-04 DIAGNOSIS — Z95.820 S/P ANGIOPLASTY WITH STENT: ICD-10-CM

## 2023-12-04 DIAGNOSIS — C44.519 BASAL CELL CARCINOMA (BCC) OF SKIN OF OTHER PART OF TORSO: ICD-10-CM

## 2023-12-04 DIAGNOSIS — Z79.02 ANTIPLATELET OR ANTITHROMBOTIC LONG-TERM USE: ICD-10-CM

## 2023-12-04 PROCEDURE — G8484 FLU IMMUNIZE NO ADMIN: HCPCS | Performed by: SURGERY

## 2023-12-04 PROCEDURE — 4004F PT TOBACCO SCREEN RCVD TLK: CPT | Performed by: SURGERY

## 2023-12-04 PROCEDURE — 3078F DIAST BP <80 MM HG: CPT | Performed by: SURGERY

## 2023-12-04 PROCEDURE — 3017F COLORECTAL CA SCREEN DOC REV: CPT | Performed by: SURGERY

## 2023-12-04 PROCEDURE — G8427 DOCREV CUR MEDS BY ELIG CLIN: HCPCS | Performed by: SURGERY

## 2023-12-04 PROCEDURE — G8417 CALC BMI ABV UP PARAM F/U: HCPCS | Performed by: SURGERY

## 2023-12-04 PROCEDURE — 1123F ACP DISCUSS/DSCN MKR DOCD: CPT | Performed by: SURGERY

## 2023-12-04 PROCEDURE — 99214 OFFICE O/P EST MOD 30 MIN: CPT | Performed by: SURGERY

## 2023-12-04 PROCEDURE — 3074F SYST BP LT 130 MM HG: CPT | Performed by: SURGERY

## 2023-12-04 NOTE — PROGRESS NOTES
[address]  [place], [address] [place] [address]  [phone]    Office Note   Tori Gamboa   MRN: [id number]     : 1950        HPI: Tori Gamboa is a 74 y.o. male who is seen in the office on 2023, re-referred by Dr. Lacy Bell for known basal cell carcinoma of the anterior, central chest.  The patient was hospitalized in 2022, for STEMI requiring PCI. At that time he was noted to have a large, fungating lesion on the anterior central chest.  He was seen by Dr. Maurice Jordan who performed a biopsy which returned as infiltrative type basal cell carcinoma. He was also seen by Dr. Pierre Townsend and plans were for a combined excision and reconstruction. The patient never followed up with Dr. Pierre Townsend and though the procedure was tentatively scheduled by Dr. Maurice Jordan, this never occurred either. He is now greater than 1 year since his PCI and interruption of his dual antiplatelet therapy will likely be approved without need for bridging. He is present with his wife. There is a concern for noncompliance, as he reports that this lesion has been present since a traumatic injury in  which failed to heal completely and continue to grow since then. He never had a biopsy until last November. He did have a CT scan of the chest abdomen and pelvis in 2022 at [facility]. That report is attached below. Date Obtained:   2022   PATHOLOGY DIAGNOSIS       \"CHEST WALL WOUND\":  SKIN HAVING BASAL CELL CARCINOMA, INFILTRATIVE   TYPE.  SEE MICROSCOPIC DESCRIPTION. Sign Out Date: 2022  MALATHI Winslow M.D. History reviewed. No pertinent past medical history.   Past Surgical History:   Procedure Laterality Date    CARDIAC PROCEDURE N/A 2022    Coronary angiography performed by Jag Vaughn MD at [address] N/A 2022    Percutaneous coronary intervention performed by Samra Conte

## 2023-12-15 DIAGNOSIS — D72.829 LEUKOCYTOSIS, UNSPECIFIED TYPE: Primary | ICD-10-CM

## 2023-12-26 ENCOUNTER — NURSE ONLY (OUTPATIENT)
Dept: FAMILY MEDICINE CLINIC | Facility: CLINIC | Age: 73
End: 2023-12-26

## 2023-12-26 DIAGNOSIS — D72.829 LEUKOCYTOSIS, UNSPECIFIED TYPE: ICD-10-CM

## 2023-12-26 LAB
BASOPHILS # BLD: 0.1 K/UL (ref 0–0.2)
BASOPHILS NFR BLD: 1 % (ref 0–2)
DIFFERENTIAL METHOD BLD: ABNORMAL
EOSINOPHIL # BLD: 0.2 K/UL (ref 0–0.8)
EOSINOPHIL NFR BLD: 1 % (ref 0.5–7.8)
ERYTHROCYTE [DISTWIDTH] IN BLOOD BY AUTOMATED COUNT: 13.9 % (ref 11.9–14.6)
HCT VFR BLD AUTO: 45 % (ref 41.1–50.3)
HGB BLD-MCNC: 14.3 G/DL (ref 13.6–17.2)
IMM GRANULOCYTES # BLD AUTO: 0.1 K/UL (ref 0–0.5)
IMM GRANULOCYTES NFR BLD AUTO: 1 % (ref 0–5)
LYMPHOCYTES # BLD: 2 K/UL (ref 0.5–4.6)
LYMPHOCYTES NFR BLD: 13 % (ref 13–44)
MCH RBC QN AUTO: 29 PG (ref 26.1–32.9)
MCHC RBC AUTO-ENTMCNC: 31.8 G/DL (ref 31.4–35)
MCV RBC AUTO: 91.3 FL (ref 82–102)
MONOCYTES # BLD: 1.2 K/UL (ref 0.1–1.3)
MONOCYTES NFR BLD: 8 % (ref 4–12)
NEUTS SEG # BLD: 11.5 K/UL (ref 1.7–8.2)
NEUTS SEG NFR BLD: 76 % (ref 43–78)
NRBC # BLD: 0 K/UL (ref 0–0.2)
PLATELET # BLD AUTO: 391 K/UL (ref 150–450)
PMV BLD AUTO: 9.9 FL (ref 9.4–12.3)
RBC # BLD AUTO: 4.93 M/UL (ref 4.23–5.6)
WBC # BLD AUTO: 15 K/UL (ref 4.3–11.1)

## 2024-01-05 ENCOUNTER — TELEPHONE (OUTPATIENT)
Dept: FAMILY MEDICINE CLINIC | Facility: CLINIC | Age: 74
End: 2024-01-05

## 2024-01-09 DIAGNOSIS — D72.829 LEUKOCYTOSIS, UNSPECIFIED TYPE: Primary | ICD-10-CM

## 2024-01-23 ENCOUNTER — INITIAL CONSULT (OUTPATIENT)
Age: 74
End: 2024-01-23
Payer: MEDICARE

## 2024-01-23 VITALS
SYSTOLIC BLOOD PRESSURE: 122 MMHG | HEIGHT: 69 IN | WEIGHT: 191.2 LBS | HEART RATE: 66 BPM | DIASTOLIC BLOOD PRESSURE: 64 MMHG | BODY MASS INDEX: 28.32 KG/M2

## 2024-01-23 DIAGNOSIS — I10 PRIMARY HYPERTENSION: Primary | ICD-10-CM

## 2024-01-23 DIAGNOSIS — I25.10 CORONARY ARTERY DISEASE INVOLVING NATIVE CORONARY ARTERY OF NATIVE HEART WITHOUT ANGINA PECTORIS: ICD-10-CM

## 2024-01-23 DIAGNOSIS — Z95.820 S/P ANGIOPLASTY WITH STENT: ICD-10-CM

## 2024-01-23 DIAGNOSIS — Z72.0 TOBACCO ABUSE: ICD-10-CM

## 2024-01-23 PROCEDURE — 3074F SYST BP LT 130 MM HG: CPT | Performed by: INTERNAL MEDICINE

## 2024-01-23 PROCEDURE — 93000 ELECTROCARDIOGRAM COMPLETE: CPT | Performed by: INTERNAL MEDICINE

## 2024-01-23 PROCEDURE — G8484 FLU IMMUNIZE NO ADMIN: HCPCS | Performed by: INTERNAL MEDICINE

## 2024-01-23 PROCEDURE — 3078F DIAST BP <80 MM HG: CPT | Performed by: INTERNAL MEDICINE

## 2024-01-23 PROCEDURE — G8427 DOCREV CUR MEDS BY ELIG CLIN: HCPCS | Performed by: INTERNAL MEDICINE

## 2024-01-23 PROCEDURE — 4004F PT TOBACCO SCREEN RCVD TLK: CPT | Performed by: INTERNAL MEDICINE

## 2024-01-23 PROCEDURE — 3017F COLORECTAL CA SCREEN DOC REV: CPT | Performed by: INTERNAL MEDICINE

## 2024-01-23 PROCEDURE — 1123F ACP DISCUSS/DSCN MKR DOCD: CPT | Performed by: INTERNAL MEDICINE

## 2024-01-23 PROCEDURE — G8417 CALC BMI ABV UP PARAM F/U: HCPCS | Performed by: INTERNAL MEDICINE

## 2024-01-23 PROCEDURE — 99214 OFFICE O/P EST MOD 30 MIN: CPT | Performed by: INTERNAL MEDICINE

## 2024-01-23 ASSESSMENT — ENCOUNTER SYMPTOMS
ORTHOPNEA: 0
ABDOMINAL PAIN: 0
SHORTNESS OF BREATH: 0

## 2024-01-23 NOTE — PROGRESS NOTES
[facility] CARDIOLOGY  [address] [place] [address], [address]  [place] [address]  PHONE: [phone]      24    NAME:  Fox Marie  : 1950  MRN: [id number]         SUBJECTIVE:   Fox Marie is a 73 y.o. male seen for a follow up visit regarding the following:     Chief Complaint   Patient presents with    Coronary Artery Disease    Consultation    Cardiac Clearance     Cancer removal off chest            HPI:  Follow up  Coronary Artery Disease, Consultation, and Cardiac Clearance (Cancer removal off chest)   .    Mr. Marie presents today for follow-up.  He last followed up with us in  after a PCI to his LAD.  He was also noted to have some circumflex disease that was left alone and treated medically.  Since then, he reports some fatigue, has never gotten his energy totally back after this.  He has had no chest pain, shortness of breath, orthopnea, PND, palpitations, syncope or lower extremity swelling.  His EF after his MI was 41% with regional wall motion abnormalities consistent with his MI.  No new complaints today.  Patient scheduled to have skin cancer removed off of his chest later this spring.  Needs cardiac clearance.    Coronary Artery Disease  Pertinent negatives include no chest pain, leg swelling, palpitations or shortness of breath.           Key CAD CHF Meds            amLODIPine (NORVASC) 5 MG tablet (Taking)    Sig - Route: Take 1 tablet by mouth daily - Oral    atorvastatin (LIPITOR) 80 MG tablet (Taking)    Sig - Route: Take 1 tablet by mouth nightly - Oral    losartan (COZAAR) 50 MG tablet (Taking)    Sig - Route: Take 1 tablet by mouth daily - Oral    metoprolol succinate (TOPROL XL) 50 MG extended release tablet (Taking)    Sig - Route: Take 1 tablet by mouth daily - Oral    furosemide (LASIX) 40 MG tablet (Taking)    Sig - Route: Take 1 tablet by mouth daily - Oral          Key Antihyperglycemic Medications       Patient is on no antihyperglycemic meds.

## 2024-01-30 ENCOUNTER — TELEPHONE (OUTPATIENT)
Age: 74
End: 2024-01-30

## 2024-01-31 NOTE — TELEPHONE ENCOUNTER
Faxed, to requested number, Dr. Santana's note, dated 01/23/24, that has pre-op clearance  as needed by Dr. Chadwick.

## 2024-02-02 NOTE — PROGRESS NOTES
NEW PATIENT INTAKE    Referral Diagnosis: Leukocytosis, unspecified type    Referring Provider: Byron Villa MD    Primary Care Provider: Byron Villa MD      Family History of Cancer/ Hematology Disorders: Family history significant father with unknown cancer.    Presenting Symptoms: Elevated WBC counts    Chronological History of Pertinent Events:     73-year-old white male    PMH: BCC, CAD, Ischemic cardiomyopathy, HTN, history of smoking, HLD, Vitamin D deficiency, Dysthymia    2/21/23 - Met with PCP ([facility])  New patient, here to establish care  Recently had a myocardial infarction.    He has a mildly to moderately reduced ejection fraction 42%.    He presently is on Lasix.    He has a follow-up with cardiology next week.  Referral placed with General Surgery for BCC.  RTC in 3 months  Abnormal labs (11/18/22): (EPIC)  WBC - 24.1  RBC - 2.94  Hgb - 9.2  Hct - 28.9    11/13/23 - Met with PCP ([facility])  Here today for AWV and evaluation of HTN  Chief complaint today of fatigue, low energy but denies chest pain or SOB.  He has a significant cardiac history with ischemic cardiomyopathy, EF at 42% on last echo with focal wall motion abnormality.  Has a history of a BCC of the central chest, he has seen general surgery for this and he failed to follow through with recommended intervention.  Patient's wife reports that he is easily agitated, flat affect around the home.  Abnormal labs (11/13/23): (EPIC)  WBC - 14.8  Neutrophils Abs - 10.8  Monocytes Abs - 1.4  Est, Glom Filt Rate - 52  HDL Chol - 35  Triglycerides - 201  VLDL Chol Calc - 40.2  Alk Phos - 193  AST - 13  Vit D, 25-Hydroxy - 24.7    A referral has been placed with [facility] for a Medical Hematology consultation, and treatment.    (EPIC)   Latest Reference Range & Units 11/12/22 21:48 11/13/22 04:52 11/14/22 05:37 11/15/22 05:11 11/16/22 06:27   WBC 4.3 - 11.1 K/uL 14.1 (H) 16.1 (H) 16.1 (H) 14.8 (H) 18.2 (H)      Latest Reference Range & Units 11/16/22 14:50

## 2024-02-05 ENCOUNTER — HOSPITAL ENCOUNTER (OUTPATIENT)
Dept: LAB | Age: 74
Discharge: HOME | End: 2024-02-08
Payer: MEDICARE

## 2024-02-05 ENCOUNTER — OFFICE VISIT (OUTPATIENT)
Dept: ONCOLOGY | Age: 74
End: 2024-02-05
Payer: MEDICARE

## 2024-02-05 VITALS
SYSTOLIC BLOOD PRESSURE: 136 MMHG | HEART RATE: 67 BPM | HEIGHT: 69 IN | TEMPERATURE: 97.9 F | OXYGEN SATURATION: 96 % | BODY MASS INDEX: 26.96 KG/M2 | WEIGHT: 182 LBS | DIASTOLIC BLOOD PRESSURE: 61 MMHG | RESPIRATION RATE: 16 BRPM

## 2024-02-05 DIAGNOSIS — F17.210 CIGARETTE SMOKER: ICD-10-CM

## 2024-02-05 DIAGNOSIS — D47.2 MGUS (MONOCLONAL GAMMOPATHY OF UNKNOWN SIGNIFICANCE): ICD-10-CM

## 2024-02-05 DIAGNOSIS — D47.1 MPN (MYELOPROLIFERATIVE NEOPLASM) (HCC): ICD-10-CM

## 2024-02-05 DIAGNOSIS — E55.9 VITAMIN D DEFICIENCY: ICD-10-CM

## 2024-02-05 DIAGNOSIS — D47.1 MPN (MYELOPROLIFERATIVE NEOPLASM) (HCC): Primary | ICD-10-CM

## 2024-02-05 DIAGNOSIS — D72.829 LEUKOCYTOSIS, UNSPECIFIED TYPE: ICD-10-CM

## 2024-02-05 DIAGNOSIS — D75.1 POLYCYTHEMIA: ICD-10-CM

## 2024-02-05 LAB
25(OH)D3 SERPL-MCNC: 35.5 NG/ML (ref 30–100)
ALBUMIN SERPL-MCNC: 3.5 G/DL (ref 3.2–4.6)
ALBUMIN/GLOB SERPL: 0.7 (ref 0.4–1.6)
ALP SERPL-CCNC: 139 U/L (ref 50–136)
ALT SERPL-CCNC: 23 U/L (ref 12–65)
ANION GAP SERPL CALC-SCNC: 2 MMOL/L (ref 2–11)
AST SERPL-CCNC: 14 U/L (ref 15–37)
BASOPHILS # BLD: 0.1 K/UL (ref 0–0.2)
BASOPHILS NFR BLD: 1 % (ref 0–2)
BILIRUB SERPL-MCNC: 0.4 MG/DL (ref 0.2–1.1)
BUN SERPL-MCNC: 17 MG/DL (ref 8–23)
CALCIUM SERPL-MCNC: 9.6 MG/DL (ref 8.3–10.4)
CHLORIDE SERPL-SCNC: 109 MMOL/L (ref 103–113)
CO2 SERPL-SCNC: 27 MMOL/L (ref 21–32)
COLLECTION INFORMATION: NORMAL
COLLECTION INFORMATION: NORMAL
CREAT SERPL-MCNC: 1.5 MG/DL (ref 0.8–1.5)
CRP SERPL-MCNC: 0.9 MG/DL (ref 0–0.9)
DATE SENT TO REF LAB: NORMAL
DATE SENT TO REF LAB: NORMAL
DIFFERENTIAL METHOD BLD: ABNORMAL
EOSINOPHIL # BLD: 0.1 K/UL (ref 0–0.8)
EOSINOPHIL NFR BLD: 1 % (ref 0.5–7.8)
ERYTHROCYTE [DISTWIDTH] IN BLOOD BY AUTOMATED COUNT: 15 % (ref 11.9–14.6)
ERYTHROCYTE [SEDIMENTATION RATE] IN BLOOD: 40 MM/HR (ref 0–20)
FERRITIN SERPL-MCNC: 13 NG/ML (ref 8–388)
GLOBULIN SER CALC-MCNC: 4.7 G/DL (ref 2.8–4.5)
GLUCOSE SERPL-MCNC: 92 MG/DL (ref 65–100)
HAPTOGLOB SERPL-MCNC: 286 MG/DL (ref 30–200)
HCT VFR BLD AUTO: 43 % (ref 41.1–50.3)
HGB BLD-MCNC: 14.2 G/DL (ref 13.6–17.2)
IMM GRANULOCYTES # BLD AUTO: 0.1 K/UL (ref 0–0.5)
IMM GRANULOCYTES NFR BLD AUTO: 1 % (ref 0–5)
LYMPHOCYTES # BLD: 2.1 K/UL (ref 0.5–4.6)
LYMPHOCYTES NFR BLD: 15 % (ref 13–44)
Lab: NORMAL
Lab: NORMAL
MCH RBC QN AUTO: 29 PG (ref 26.1–32.9)
MCHC RBC AUTO-ENTMCNC: 33 G/DL (ref 31.4–35)
MCV RBC AUTO: 87.8 FL (ref 82–102)
MONOCYTES # BLD: 1.3 K/UL (ref 0.1–1.3)
MONOCYTES NFR BLD: 10 % (ref 4–12)
NEUTS SEG # BLD: 9.9 K/UL (ref 1.7–8.2)
NEUTS SEG NFR BLD: 72 % (ref 43–78)
NRBC # BLD: 0 K/UL (ref 0–0.2)
PLATELET # BLD AUTO: 437 K/UL (ref 150–450)
PMV BLD AUTO: 9.4 FL (ref 9.4–12.3)
POTASSIUM SERPL-SCNC: 3.8 MMOL/L (ref 3.5–5.1)
PROT SERPL-MCNC: 8.2 G/DL (ref 6.3–8.2)
RBC # BLD AUTO: 4.9 M/UL (ref 4.23–5.6)
SODIUM SERPL-SCNC: 138 MMOL/L (ref 136–146)
VIT B12 SERPL-MCNC: 1048 PG/ML (ref 193–986)
WBC # BLD AUTO: 13.6 K/UL (ref 4.3–11.1)

## 2024-02-05 PROCEDURE — G8427 DOCREV CUR MEDS BY ELIG CLIN: HCPCS | Performed by: INTERNAL MEDICINE

## 2024-02-05 PROCEDURE — 82306 VITAMIN D 25 HYDROXY: CPT

## 2024-02-05 PROCEDURE — 82728 ASSAY OF FERRITIN: CPT

## 2024-02-05 PROCEDURE — 3078F DIAST BP <80 MM HG: CPT | Performed by: INTERNAL MEDICINE

## 2024-02-05 PROCEDURE — 80053 COMPREHEN METABOLIC PANEL: CPT

## 2024-02-05 PROCEDURE — 84165 PROTEIN E-PHORESIS SERUM: CPT

## 2024-02-05 PROCEDURE — 82784 ASSAY IGA/IGD/IGG/IGM EACH: CPT

## 2024-02-05 PROCEDURE — G8484 FLU IMMUNIZE NO ADMIN: HCPCS | Performed by: INTERNAL MEDICINE

## 2024-02-05 PROCEDURE — 86334 IMMUNOFIX E-PHORESIS SERUM: CPT

## 2024-02-05 PROCEDURE — G8417 CALC BMI ABV UP PARAM F/U: HCPCS | Performed by: INTERNAL MEDICINE

## 2024-02-05 PROCEDURE — 3075F SYST BP GE 130 - 139MM HG: CPT | Performed by: INTERNAL MEDICINE

## 2024-02-05 PROCEDURE — 82668 ASSAY OF ERYTHROPOIETIN: CPT

## 2024-02-05 PROCEDURE — 86140 C-REACTIVE PROTEIN: CPT

## 2024-02-05 PROCEDURE — 3017F COLORECTAL CA SCREEN DOC REV: CPT | Performed by: INTERNAL MEDICINE

## 2024-02-05 PROCEDURE — 36415 COLL VENOUS BLD VENIPUNCTURE: CPT

## 2024-02-05 PROCEDURE — 82607 VITAMIN B-12: CPT

## 2024-02-05 PROCEDURE — 85025 COMPLETE CBC W/AUTO DIFF WBC: CPT

## 2024-02-05 PROCEDURE — 83010 ASSAY OF HAPTOGLOBIN QUANT: CPT

## 2024-02-05 PROCEDURE — 1123F ACP DISCUSS/DSCN MKR DOCD: CPT | Performed by: INTERNAL MEDICINE

## 2024-02-05 PROCEDURE — 4004F PT TOBACCO SCREEN RCVD TLK: CPT | Performed by: INTERNAL MEDICINE

## 2024-02-05 PROCEDURE — 99205 OFFICE O/P NEW HI 60 MIN: CPT | Performed by: INTERNAL MEDICINE

## 2024-02-05 PROCEDURE — 82375 ASSAY CARBOXYHB QUANT: CPT

## 2024-02-05 PROCEDURE — 85652 RBC SED RATE AUTOMATED: CPT

## 2024-02-05 RX ORDER — ERGOCALCIFEROL 1.25 MG/1
50000 CAPSULE ORAL DAILY
Qty: 14 CAPSULE | Refills: 0 | Status: SHIPPED | OUTPATIENT
Start: 2024-02-05 | End: 2024-02-19

## 2024-02-05 NOTE — PROGRESS NOTES
[place] [id number]  Phone: [phone]        2/5/2024  Fox Marie  1950  [id number]      Fox Marie is a 73 year old  man who has been sent to my clinic by Dr. Byron Villa for evaluation of Leukocytosis; he is a cigarette smoker. 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      ALLERGIES:    Codeine.      FAMILY HISTORY:     No hematologic disorders.      SOCIAL HISTORY:     He is  and lives with his wife. He is a retired . He smokes 10 cigarettes every day.      PAST MEDICAL HISTORY:    Hypertension, Coronary Artery Disease, Hyperlipidemia, Myocardial Infarction, s/p PTCA, Congestive Heart Failure, COPD, Pulmonary Arterial Hypertension, MGUS and Vitamin D deficiency.      ROS:  The patient complained of fatigue and exertional dyspnea; all other systems negative.       PHYSICAL EXAM:   The patient was alert, awake and oriented, no acute distress was noted. Oral examination revealed an edentulous mouth, no mucosal lesions were seen. Lymph node examination did not reveal any adenopathy. Neck examination revealed a supple neck, no thyromegaly or masses were noted. Chest examination revealed slightly prolonged expiration bilaterally. Heart examination revealed S-1 and S-2 with a 2/6 systolic murmur. Abdominal examination revealed a non-tender abdomen, bowel sounds were positive, no organomegaly could be appreciated. Examination of the extremities did not reveal any tenderness or erythema. Examination of the skin did not reveal any lesions.      KPS:   80.      LABORATORY INVESTIGATIONS:  CBC showed a WBC count of 13.6, ANC was 9.9, Hemoglobin was 14.2 and Platelets were 437. Medical problems and test results were reviewed with the patient today.      ASSESSMENT:    MPN; Polycythemia; Leukocytosis; Vitamin D deficiency; cigarette smoker; MGUS, IgA Lambda. I spent a total of 62 minutes on the day of the visit, managing the care

## 2024-02-05 NOTE — PATIENT INSTRUCTIONS
Patient Instructions from Today's Visit    Reason for Visit:  New Patient    Diagnosis Information:  https://www.cancer.net/about-us/asco-answers-patient-education-materials/ofar-svwurtm-stdj-sheets    Plan:  You will have some blood work done today    Follow Up:  We will bring you back in February for a follow up with  and lab work prior.    Recent Lab Results:  Lab work done today downstairs          -------------------------------------------------------------------------------------------------------------------  Please call our office at [phone] if you have any  of the following symptoms:   Fever of 100.5 or greater  Chills  Shortness of breath  Swelling or pain in one leg    After office hours an answering service is available and will contact a provider for emergencies or if you are experiencing any of the above symptoms.    Patient did not express an interest in My Chart.  My Chart log in information explained on the after visit summary printout at the check-out desk.    Nery Goodrich MA

## 2024-02-06 LAB — EPO SERPL-ACNC: 12.1 MIU/ML (ref 2.6–18.5)

## 2024-02-08 LAB — COHGB MFR BLD: 9.7 % (ref 0–3.6)

## 2024-02-12 DIAGNOSIS — I10 PRIMARY HYPERTENSION: ICD-10-CM

## 2024-02-12 DIAGNOSIS — I25.10 CORONARY ARTERY DISEASE INVOLVING NATIVE CORONARY ARTERY OF NATIVE HEART WITHOUT ANGINA PECTORIS: ICD-10-CM

## 2024-02-12 LAB
ALBUMIN SERPL ELPH-MCNC: 3.9 G/DL (ref 2.9–4.4)
ALBUMIN/GLOB SERPL: 1 (ref 0.7–1.7)
ALPHA1 GLOB SERPL ELPH-MCNC: 0.3 G/DL (ref 0–0.4)
ALPHA2 GLOB SERPL ELPH-MCNC: 0.9 G/DL (ref 0.4–1)
B-GLOBULIN SERPL ELPH-MCNC: 1.7 G/DL (ref 0.7–1.3)
GAMMA GLOB SERPL ELPH-MCNC: 1.1 G/DL (ref 0.4–1.8)
GLOBULIN SER-MCNC: 4 G/DL (ref 2.2–3.9)
IGA SERPL-MCNC: 748 MG/DL (ref 61–437)
IGG SERPL-MCNC: 1416 MG/DL (ref 603–1613)
IGM SERPL-MCNC: 47 MG/DL (ref 15–143)
INTERPRETATION SERPL IEP-IMP: ABNORMAL
M PROTEIN SERPL ELPH-MCNC: 0.4 G/DL
PROT SERPL-MCNC: 7.9 G/DL (ref 6–8.5)

## 2024-02-12 RX ORDER — LOSARTAN POTASSIUM 50 MG/1
50 TABLET ORAL DAILY
Qty: 90 TABLET | Refills: 1 | OUTPATIENT
Start: 2024-02-12 | End: 2024-08-10

## 2024-02-15 ENCOUNTER — TELEPHONE (OUTPATIENT)
Dept: FAMILY MEDICINE CLINIC | Facility: CLINIC | Age: 74
End: 2024-02-15

## 2024-02-15 DIAGNOSIS — D47.1 MPN (MYELOPROLIFERATIVE NEOPLASM) (HCC): ICD-10-CM

## 2024-02-15 DIAGNOSIS — E55.9 VITAMIN D DEFICIENCY: Primary | ICD-10-CM

## 2024-02-15 DIAGNOSIS — D75.1 POLYCYTHEMIA: ICD-10-CM

## 2024-02-16 ENCOUNTER — OFFICE VISIT (OUTPATIENT)
Dept: ONCOLOGY | Age: 74
End: 2024-02-16
Payer: MEDICARE

## 2024-02-16 ENCOUNTER — HOSPITAL ENCOUNTER (OUTPATIENT)
Dept: LAB | Age: 74
End: 2024-02-16
Payer: MEDICARE

## 2024-02-16 VITALS
HEIGHT: 69 IN | DIASTOLIC BLOOD PRESSURE: 65 MMHG | SYSTOLIC BLOOD PRESSURE: 140 MMHG | BODY MASS INDEX: 26.7 KG/M2 | OXYGEN SATURATION: 99 % | TEMPERATURE: 97.6 F | RESPIRATION RATE: 16 BRPM | WEIGHT: 180.3 LBS | HEART RATE: 59 BPM

## 2024-02-16 DIAGNOSIS — E55.9 VITAMIN D DEFICIENCY: ICD-10-CM

## 2024-02-16 DIAGNOSIS — F17.210 CIGARETTE SMOKER: ICD-10-CM

## 2024-02-16 DIAGNOSIS — D47.1 MPN (MYELOPROLIFERATIVE NEOPLASM) (HCC): ICD-10-CM

## 2024-02-16 DIAGNOSIS — D75.1 POLYCYTHEMIA: ICD-10-CM

## 2024-02-16 DIAGNOSIS — D72.829 LEUKOCYTOSIS, UNSPECIFIED TYPE: ICD-10-CM

## 2024-02-16 DIAGNOSIS — D47.2 MGUS (MONOCLONAL GAMMOPATHY OF UNKNOWN SIGNIFICANCE): Primary | ICD-10-CM

## 2024-02-16 LAB
25(OH)D3 SERPL-MCNC: 116.5 NG/ML (ref 30–100)
ALBUMIN SERPL-MCNC: 3.7 G/DL (ref 3.2–4.6)
ALBUMIN/GLOB SERPL: 0.8 (ref 0.4–1.6)
ALP SERPL-CCNC: 162 U/L (ref 50–136)
ALT SERPL-CCNC: 25 U/L (ref 12–65)
ANION GAP SERPL CALC-SCNC: 5 MMOL/L (ref 2–11)
AST SERPL-CCNC: 21 U/L (ref 15–37)
BASOPHILS # BLD: 0.1 K/UL (ref 0–0.2)
BASOPHILS NFR BLD: 1 % (ref 0–2)
BILIRUB SERPL-MCNC: 0.4 MG/DL (ref 0.2–1.1)
BUN SERPL-MCNC: 29 MG/DL (ref 8–23)
CALCIUM SERPL-MCNC: 9.6 MG/DL (ref 8.3–10.4)
CHLORIDE SERPL-SCNC: 107 MMOL/L (ref 103–113)
CO2 SERPL-SCNC: 26 MMOL/L (ref 21–32)
CREAT SERPL-MCNC: 1.9 MG/DL (ref 0.8–1.5)
DIFFERENTIAL METHOD BLD: ABNORMAL
EOSINOPHIL # BLD: 0.2 K/UL (ref 0–0.8)
EOSINOPHIL NFR BLD: 1 % (ref 0.5–7.8)
ERYTHROCYTE [DISTWIDTH] IN BLOOD BY AUTOMATED COUNT: 14.8 % (ref 11.9–14.6)
FERRITIN SERPL-MCNC: 16 NG/ML (ref 8–388)
GLOBULIN SER CALC-MCNC: 4.9 G/DL (ref 2.8–4.5)
GLUCOSE SERPL-MCNC: 125 MG/DL (ref 65–100)
HCT VFR BLD AUTO: 44.2 % (ref 41.1–50.3)
HGB BLD-MCNC: 14.5 G/DL (ref 13.6–17.2)
IMM GRANULOCYTES # BLD AUTO: 0.1 K/UL (ref 0–0.5)
IMM GRANULOCYTES NFR BLD AUTO: 0 % (ref 0–5)
LYMPHOCYTES # BLD: 2.2 K/UL (ref 0.5–4.6)
LYMPHOCYTES NFR BLD: 16 % (ref 13–44)
MCH RBC QN AUTO: 29.2 PG (ref 26.1–32.9)
MCHC RBC AUTO-ENTMCNC: 32.8 G/DL (ref 31.4–35)
MCV RBC AUTO: 89.1 FL (ref 82–102)
MONOCYTES # BLD: 1.2 K/UL (ref 0.1–1.3)
MONOCYTES NFR BLD: 9 % (ref 4–12)
NEUTS SEG # BLD: 10.3 K/UL (ref 1.7–8.2)
NEUTS SEG NFR BLD: 73 % (ref 43–78)
NRBC # BLD: 0 K/UL (ref 0–0.2)
PLATELET # BLD AUTO: 414 K/UL (ref 150–450)
PMV BLD AUTO: 9.1 FL (ref 9.4–12.3)
POTASSIUM SERPL-SCNC: 4.1 MMOL/L (ref 3.5–5.1)
PROT SERPL-MCNC: 8.6 G/DL (ref 6.3–8.2)
RBC # BLD AUTO: 4.96 M/UL (ref 4.23–5.6)
SODIUM SERPL-SCNC: 138 MMOL/L (ref 136–146)
WBC # BLD AUTO: 14.1 K/UL (ref 4.3–11.1)

## 2024-02-16 PROCEDURE — 82784 ASSAY IGA/IGD/IGG/IGM EACH: CPT

## 2024-02-16 PROCEDURE — 82232 ASSAY OF BETA-2 PROTEIN: CPT

## 2024-02-16 PROCEDURE — G8427 DOCREV CUR MEDS BY ELIG CLIN: HCPCS | Performed by: INTERNAL MEDICINE

## 2024-02-16 PROCEDURE — 84165 PROTEIN E-PHORESIS SERUM: CPT

## 2024-02-16 PROCEDURE — 3077F SYST BP >= 140 MM HG: CPT | Performed by: INTERNAL MEDICINE

## 2024-02-16 PROCEDURE — 82728 ASSAY OF FERRITIN: CPT

## 2024-02-16 PROCEDURE — 85025 COMPLETE CBC W/AUTO DIFF WBC: CPT

## 2024-02-16 PROCEDURE — 36415 COLL VENOUS BLD VENIPUNCTURE: CPT

## 2024-02-16 PROCEDURE — 3078F DIAST BP <80 MM HG: CPT | Performed by: INTERNAL MEDICINE

## 2024-02-16 PROCEDURE — 3017F COLORECTAL CA SCREEN DOC REV: CPT | Performed by: INTERNAL MEDICINE

## 2024-02-16 PROCEDURE — 82306 VITAMIN D 25 HYDROXY: CPT

## 2024-02-16 PROCEDURE — G8417 CALC BMI ABV UP PARAM F/U: HCPCS | Performed by: INTERNAL MEDICINE

## 2024-02-16 PROCEDURE — 83521 IG LIGHT CHAINS FREE EACH: CPT

## 2024-02-16 PROCEDURE — 1123F ACP DISCUSS/DSCN MKR DOCD: CPT | Performed by: INTERNAL MEDICINE

## 2024-02-16 PROCEDURE — 99215 OFFICE O/P EST HI 40 MIN: CPT | Performed by: INTERNAL MEDICINE

## 2024-02-16 PROCEDURE — 86334 IMMUNOFIX E-PHORESIS SERUM: CPT

## 2024-02-16 PROCEDURE — 80053 COMPREHEN METABOLIC PANEL: CPT

## 2024-02-16 PROCEDURE — G8484 FLU IMMUNIZE NO ADMIN: HCPCS | Performed by: INTERNAL MEDICINE

## 2024-02-16 PROCEDURE — 4004F PT TOBACCO SCREEN RCVD TLK: CPT | Performed by: INTERNAL MEDICINE

## 2024-02-16 NOTE — PATIENT INSTRUCTIONS
Patient Instructions from Today's Visit    Reason for Visit:  Follow Up    Diagnosis Information:  https://www.cancer.net/about-us/asco-answers-patient-education-materials/dlhm-kvetghw-vrlb-sheets    Plan:  Lab work looks stable today  We found a protein in your blood that puts you at risk for a bone marrow cancer    -you do not have cancer at this time, but we will continue to monitor you on a regular basis to make sure this stays under control  We have ordered a Skeletal Survey to look for any bone lesions  Continue taking over the counter Vitamin D 1,000 units daily  Follow Up:  We will bring you back in April for a follow up with  and lab work prior.    Recent Lab Results:          -------------------------------------------------------------------------------------------------------------------  Please call our office at [phone] if you have any  of the following symptoms:   Fever of 100.5 or greater  Chills  Shortness of breath  Swelling or pain in one leg    After office hours an answering service is available and will contact a provider for emergencies or if you are experiencing any of the above symptoms.    Patient did not express an interest in My Chart.  My Chart log in information explained on the after visit summary printout at the check-out desk.    Nery Goodrich MA

## 2024-02-16 NOTE — PROGRESS NOTES
Social Determinants of Health     Tobacco Use: High Risk (2/16/2024)    Patient History     Smoking Tobacco Use: Every Day     Smokeless Tobacco Use: Never     Passive Exposure: Not on file   Alcohol Use: Not At Risk (11/13/2023)    AUDIT-C     Frequency of Alcohol Consumption: Never     Average Number of Drinks: Patient does not drink     Frequency of Binge Drinking: Never   Financial Resource Strain: Low Risk  (2/16/2024)    Overall Financial Resource Strain (CARDIA)     Difficulty of Paying Living Expenses: Not very hard   Food Insecurity: Food Insecurity Present (2/16/2024)    Hunger Vital Sign     Worried About Running Out of Food in the Last Year: Sometimes true     Ran Out of Food in the Last Year: Sometimes true   Transportation Needs: Unmet Transportation Needs (2/16/2024)    PRAPARE - Transportation     Lack of Transportation (Medical): Yes     Lack of Transportation (Non-Medical): Yes   Physical Activity: Inactive (11/13/2023)    Exercise Vital Sign     Days of Exercise per Week: 0 days     Minutes of Exercise per Session: 0 min   Stress: Stress Concern Present (2/16/2024)    [employer] [place] [employer] - Occupational Stress Questionnaire     Feeling of Stress : To some extent   Social Connections: Unknown (2/16/2024)    Social Connection and Isolation Panel [NHANES]     Frequency of Communication with Friends and Family: More than three times a week     Frequency of Social Gatherings with Friends and Family: More than three times a week     Attends Religion Services: Not on file     Active Member of Clubs or Organizations: No     Attends Club or Organization Meetings: Never     Marital Status: Living with partner   Intimate Partner Violence: Not At Risk (2/16/2024)    Humiliation, Afraid, Rape, and Kick questionnaire     Fear of Current or Ex-Partner: No     Emotionally Abused: No     Physically Abused: No     Sexually Abused: No   Depression: Not at risk (2/16/2024)    PHQ-2     PHQ-2

## 2024-02-16 NOTE — PROGRESS NOTES
[place] [id number]  Phone: [phone]           2/16/2024  Fox Marie  1950  [id number]        Fox Marie is a 73 year old  man who has returned to my clinic by Dr. Byron Villa for evaluation of Leukocytosis, his Myeloid Disorders Profile was unremarkable; he is a cigarette smoker. 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        ALLERGIES:    Codeine.        FAMILY HISTORY:     No hematologic disorders.        SOCIAL HISTORY:     He is  and lives with his wife. He is a retired . He smokes 10 cigarettes every day.        PAST MEDICAL HISTORY:    Hypertension, Coronary Artery Disease, Hyperlipidemia, Myocardial Infarction, s/p PTCA, Congestive Heart Failure, COPD, Pulmonary Arterial Hypertension, MGUS and Vitamin D deficiency.        ROS:  The patient complained of fatigue and exertional dyspnea; all other systems negative.        PHYSICAL EXAM:   The patient was alert, awake and oriented, no acute distress was noted. Oral examination revealed an edentulous mouth, no mucosal lesions were seen. Lymph node examination did not reveal any adenopathy. Neck examination revealed a supple neck, no thyromegaly or masses were noted. Chest examination revealed slightly prolonged expiration bilaterally. Heart examination revealed S-1 and S-2 with a 2/6 systolic murmur. Abdominal examination revealed a non-tender abdomen, bowel sounds were positive, no organomegaly could be appreciated. Examination of the extremities did not reveal any tenderness or erythema. Examination of the skin did not reveal any lesions.        KPS:   80.        LABORATORY INVESTIGATIONS:  CBC showed a WBC count of 14.1, ANC was 10.3, Hemoglobin was 14.5 and Platelets were 414. Medical problems and test results were reviewed with the patient today.        ASSESSMENT:     Polycythemia; Leukocytosis; Vitamin D deficiency; cigarette smoker; MGUS, IgA Lambda. I

## 2024-02-17 LAB — B2 MICROGLOB SERPL-MCNC: 2.7 MG/L (ref 0.6–2.4)

## 2024-02-19 LAB
KAPPA LC FREE SER-MCNC: 65.5 MG/L (ref 3.3–19.4)
KAPPA LC FREE/LAMBDA FREE SER: 0.73 (ref 0.26–1.65)
LAMBDA LC FREE SERPL-MCNC: 89.9 MG/L (ref 5.7–26.3)

## 2024-02-22 LAB
ALBUMIN SERPL ELPH-MCNC: 3.8 G/DL (ref 2.9–4.4)
ALBUMIN/GLOB SERPL: 1 (ref 0.7–1.7)
ALPHA1 GLOB SERPL ELPH-MCNC: 0.3 G/DL (ref 0–0.4)
ALPHA2 GLOB SERPL ELPH-MCNC: 0.9 G/DL (ref 0.4–1)
B-GLOBULIN SERPL ELPH-MCNC: 1.7 G/DL (ref 0.7–1.3)
GAMMA GLOB SERPL ELPH-MCNC: 1.3 G/DL (ref 0.4–1.8)
GLOBULIN SER-MCNC: 4.2 G/DL (ref 2.2–3.9)
IGA SERPL-MCNC: 987 MG/DL (ref 61–437)
IGG SERPL-MCNC: 2032 MG/DL (ref 603–1613)
IGM SERPL-MCNC: 65 MG/DL (ref 15–143)
INTERPRETATION SERPL IEP-IMP: ABNORMAL
M PROTEIN SERPL ELPH-MCNC: 0.5 G/DL
PROT SERPL-MCNC: 8 G/DL (ref 6–8.5)

## 2024-03-07 ENCOUNTER — ANESTHESIA EVENT (OUTPATIENT)
Dept: SURGERY | Age: 74
End: 2024-03-07
Payer: MEDICARE

## 2024-03-07 ENCOUNTER — TELEPHONE (OUTPATIENT)
Age: 74
End: 2024-03-07

## 2024-03-07 NOTE — TELEPHONE ENCOUNTER
Spoke to pt's wife, and informed her of pt's echo result per Dr. Santana. Pt voiced understanding.

## 2024-03-07 NOTE — TELEPHONE ENCOUNTER
----- Message from Jennifer Mario MA sent at 3/7/2024  1:16 PM EST -----    ----- Message -----  From: Edis Santana III, MD  Sent: 3/7/2024  11:15 AM EST  To: Rosa Maria Lucio MA    Please let patient know that their echo was normal. Patient can follow up as scheduled.

## 2024-03-07 NOTE — PERIOP NOTE
Patient verified name and .  Patient gave permission to complete assessment with his wife, Lisa.    Order for consent NOT found in EHR at time of PAT visit. Unable to verify case posting against order; surgery verified by patient.    Type 2 surgery, phone assessment complete.  Orders not received.  Labs per surgeon: none ordered  Labs per anesthesia protocol: K+ s/h for DOS.  24 K+ 4.1, creatinine 1.9, Hgb 14.5.  Dr. Lopez notified of case,  creatinine 1.9 on 24, no hx of CRI/CKD.  No orders received.    EK24  Cardiology visit 24  Echo:  3/1/24 EF 71%    Patient's wife answered medical/surgical history questions at their best of ability. All prior to admission medications documented in EPIC.    Patient's wife instructed to have the patient continue taking all prescription medications up to the day of surgery but to take only the following medications the day of surgery according to anesthesia guidelines with a small sip of water: aspirin 81 mg, albuterol inhaler if needed, duloxetine, atorvastatin, metoprolol, amlodipine.   Also, patient is requested to take 2 Tylenol in the morning and then again before bed on the day before surgery. Regular or extra strength may be used.       Patient's wife informed that all vitamins and supplements should be held 7 days prior to surgery and NSAIDS 5 days prior to surgery. Prescription meds to hold: clopidogrel as instructed by surgeon.  Patient and his wife stated that he did not receive any instructions to hold plavix-clopidogrel.  Patient stated that he did take a dose of clopidogrel this morning.  Patient's wife instructed to call Dr. Carrasquillo's office regarding clopidogrel.  Message left on surgery scheduler, Briseida, voicemail at Dr. Carrasquillo's office regarding the patient stated he did not receive instructions to hold clopidogrel and his last dose was 3/7/24.   Instructed to bring clopidogrel in the original bottle to the hospital the  DOS.    Patient's  instructed on the following:  > Bring nitroglycerin and albuterol inhaler to the hospital  > Arrive at main Entrance, time of arrival to be called the day before by 1700  > NPO after midnight, unless otherwise indicated, including gum, mints, and ice chips  > Responsible adult must drive patient to the hospital, stay during surgery, and patient will need supervision 24 hours after anesthesia  > Use non moisturizing soap in shower the night before surgery and on the morning of surgery  > All piercings must be removed prior to arrival.    > Leave all valuables (money and jewelry) at home but bring insurance card and ID on DOS.   > You may be required to pay a deductible or co-pay on the day of your procedure. You can pre-pay by calling [phone] if your surgery is at the [facility] or [phone] if your surgery is at the [facility].  > Do not wear make-up, nail polish, lotions, cologne, perfumes, powders, or oil on skin. Artificial nails are not permitted.

## 2024-03-08 ENCOUNTER — HOSPITAL ENCOUNTER (OUTPATIENT)
Age: 74
Setting detail: OBSERVATION
Discharge: HOME | End: 2024-03-09
Attending: SURGERY | Admitting: SURGERY
Payer: MEDICARE

## 2024-03-08 ENCOUNTER — ANESTHESIA (OUTPATIENT)
Dept: SURGERY | Age: 74
End: 2024-03-08
Payer: MEDICARE

## 2024-03-08 DIAGNOSIS — G89.18 POST-OPERATIVE PAIN: Primary | ICD-10-CM

## 2024-03-08 LAB — POTASSIUM BLD-SCNC: 3.7 MMOL/L (ref 3.5–5.1)

## 2024-03-08 PROCEDURE — 3600000014 HC SURGERY LEVEL 4 ADDTL 15MIN: Performed by: SURGERY

## 2024-03-08 PROCEDURE — 2580000003 HC RX 258: Performed by: SURGERY

## 2024-03-08 PROCEDURE — 3600000004 HC SURGERY LEVEL 4 BASE: Performed by: SURGERY

## 2024-03-08 PROCEDURE — 2580000003 HC RX 258: Performed by: STUDENT IN AN ORGANIZED HEALTH CARE EDUCATION/TRAINING PROGRAM

## 2024-03-08 PROCEDURE — 7100000001 HC PACU RECOVERY - ADDTL 15 MIN: Performed by: SURGERY

## 2024-03-08 PROCEDURE — 2500000003 HC RX 250 WO HCPCS: Performed by: SURGERY

## 2024-03-08 PROCEDURE — 3700000000 HC ANESTHESIA ATTENDED CARE: Performed by: SURGERY

## 2024-03-08 PROCEDURE — G0378 HOSPITAL OBSERVATION PER HR: HCPCS

## 2024-03-08 PROCEDURE — 2720000010 HC SURG SUPPLY STERILE: Performed by: SURGERY

## 2024-03-08 PROCEDURE — 88305 TISSUE EXAM BY PATHOLOGIST: CPT

## 2024-03-08 PROCEDURE — 6360000002 HC RX W HCPCS: Performed by: SURGERY

## 2024-03-08 PROCEDURE — 7100000000 HC PACU RECOVERY - FIRST 15 MIN: Performed by: SURGERY

## 2024-03-08 PROCEDURE — 3700000001 HC ADD 15 MINUTES (ANESTHESIA): Performed by: SURGERY

## 2024-03-08 PROCEDURE — 88304 TISSUE EXAM BY PATHOLOGIST: CPT

## 2024-03-08 PROCEDURE — 2709999900 HC NON-CHARGEABLE SUPPLY: Performed by: SURGERY

## 2024-03-08 PROCEDURE — 2500000003 HC RX 250 WO HCPCS: Performed by: STUDENT IN AN ORGANIZED HEALTH CARE EDUCATION/TRAINING PROGRAM

## 2024-03-08 PROCEDURE — A4217 STERILE WATER/SALINE, 500 ML: HCPCS | Performed by: SURGERY

## 2024-03-08 PROCEDURE — 6360000002 HC RX W HCPCS: Performed by: STUDENT IN AN ORGANIZED HEALTH CARE EDUCATION/TRAINING PROGRAM

## 2024-03-08 PROCEDURE — 84132 ASSAY OF SERUM POTASSIUM: CPT

## 2024-03-08 RX ORDER — SODIUM CHLORIDE, SODIUM LACTATE, POTASSIUM CHLORIDE, CALCIUM CHLORIDE 600; 310; 30; 20 MG/100ML; MG/100ML; MG/100ML; MG/100ML
INJECTION, SOLUTION INTRAVENOUS CONTINUOUS
Status: DISCONTINUED | OUTPATIENT
Start: 2024-03-08 | End: 2024-03-08 | Stop reason: HOSPADM

## 2024-03-08 RX ORDER — ONDANSETRON 2 MG/ML
INJECTION INTRAMUSCULAR; INTRAVENOUS PRN
Status: DISCONTINUED | OUTPATIENT
Start: 2024-03-08 | End: 2024-03-08 | Stop reason: SDUPTHER

## 2024-03-08 RX ORDER — OXYCODONE HYDROCHLORIDE 5 MG/1
5 TABLET ORAL
Status: DISCONTINUED | OUTPATIENT
Start: 2024-03-08 | End: 2024-03-08 | Stop reason: HOSPADM

## 2024-03-08 RX ORDER — ONDANSETRON 2 MG/ML
4 INJECTION INTRAMUSCULAR; INTRAVENOUS EVERY 6 HOURS PRN
Status: DISCONTINUED | OUTPATIENT
Start: 2024-03-08 | End: 2024-03-09 | Stop reason: HOSPADM

## 2024-03-08 RX ORDER — SODIUM CHLORIDE 0.9 % (FLUSH) 0.9 %
5-40 SYRINGE (ML) INJECTION EVERY 12 HOURS SCHEDULED
Status: DISCONTINUED | OUTPATIENT
Start: 2024-03-08 | End: 2024-03-08 | Stop reason: HOSPADM

## 2024-03-08 RX ORDER — DEXAMETHASONE SODIUM PHOSPHATE 4 MG/ML
INJECTION, SOLUTION INTRA-ARTICULAR; INTRALESIONAL; INTRAMUSCULAR; INTRAVENOUS; SOFT TISSUE PRN
Status: DISCONTINUED | OUTPATIENT
Start: 2024-03-08 | End: 2024-03-08 | Stop reason: SDUPTHER

## 2024-03-08 RX ORDER — ONDANSETRON 4 MG/1
4 TABLET, ORALLY DISINTEGRATING ORAL EVERY 8 HOURS PRN
Status: DISCONTINUED | OUTPATIENT
Start: 2024-03-08 | End: 2024-03-09 | Stop reason: HOSPADM

## 2024-03-08 RX ORDER — MAGNESIUM HYDROXIDE 1200 MG/15ML
LIQUID ORAL CONTINUOUS PRN
Status: COMPLETED | OUTPATIENT
Start: 2024-03-08 | End: 2024-03-08

## 2024-03-08 RX ORDER — LIDOCAINE HYDROCHLORIDE AND EPINEPHRINE 10; 10 MG/ML; UG/ML
INJECTION, SOLUTION INFILTRATION; PERINEURAL PRN
Status: DISCONTINUED | OUTPATIENT
Start: 2024-03-08 | End: 2024-03-08 | Stop reason: ALTCHOICE

## 2024-03-08 RX ORDER — FENTANYL CITRATE 50 UG/ML
100 INJECTION, SOLUTION INTRAMUSCULAR; INTRAVENOUS
Status: DISCONTINUED | OUTPATIENT
Start: 2024-03-08 | End: 2024-03-08 | Stop reason: HOSPADM

## 2024-03-08 RX ORDER — SODIUM CHLORIDE 0.9 % (FLUSH) 0.9 %
5-40 SYRINGE (ML) INJECTION PRN
Status: DISCONTINUED | OUTPATIENT
Start: 2024-03-08 | End: 2024-03-09 | Stop reason: HOSPADM

## 2024-03-08 RX ORDER — ACETAMINOPHEN 325 MG/1
650 TABLET ORAL EVERY 6 HOURS PRN
Status: DISCONTINUED | OUTPATIENT
Start: 2024-03-08 | End: 2024-03-09 | Stop reason: HOSPADM

## 2024-03-08 RX ORDER — SODIUM CHLORIDE 0.9 % (FLUSH) 0.9 %
5-40 SYRINGE (ML) INJECTION PRN
Status: DISCONTINUED | OUTPATIENT
Start: 2024-03-08 | End: 2024-03-08 | Stop reason: HOSPADM

## 2024-03-08 RX ORDER — NEOSTIGMINE METHYLSULFATE 1 MG/ML
INJECTION, SOLUTION INTRAVENOUS PRN
Status: DISCONTINUED | OUTPATIENT
Start: 2024-03-08 | End: 2024-03-08 | Stop reason: SDUPTHER

## 2024-03-08 RX ORDER — FENTANYL CITRATE 50 UG/ML
INJECTION, SOLUTION INTRAMUSCULAR; INTRAVENOUS PRN
Status: DISCONTINUED | OUTPATIENT
Start: 2024-03-08 | End: 2024-03-08 | Stop reason: SDUPTHER

## 2024-03-08 RX ORDER — PROCHLORPERAZINE EDISYLATE 5 MG/ML
5 INJECTION INTRAMUSCULAR; INTRAVENOUS
Status: DISCONTINUED | OUTPATIENT
Start: 2024-03-08 | End: 2024-03-08 | Stop reason: HOSPADM

## 2024-03-08 RX ORDER — HYDROMORPHONE HYDROCHLORIDE 2 MG/ML
INJECTION, SOLUTION INTRAMUSCULAR; INTRAVENOUS; SUBCUTANEOUS PRN
Status: DISCONTINUED | OUTPATIENT
Start: 2024-03-08 | End: 2024-03-08 | Stop reason: SDUPTHER

## 2024-03-08 RX ORDER — ROCURONIUM BROMIDE 10 MG/ML
INJECTION, SOLUTION INTRAVENOUS PRN
Status: DISCONTINUED | OUTPATIENT
Start: 2024-03-08 | End: 2024-03-08 | Stop reason: SDUPTHER

## 2024-03-08 RX ORDER — ACETAMINOPHEN 650 MG/1
650 SUPPOSITORY RECTAL EVERY 6 HOURS PRN
Status: DISCONTINUED | OUTPATIENT
Start: 2024-03-08 | End: 2024-03-09 | Stop reason: HOSPADM

## 2024-03-08 RX ORDER — MAGNESIUM SULFATE IN WATER 40 MG/ML
2000 INJECTION, SOLUTION INTRAVENOUS PRN
Status: DISCONTINUED | OUTPATIENT
Start: 2024-03-08 | End: 2024-03-09 | Stop reason: HOSPADM

## 2024-03-08 RX ORDER — EPINEPHRINE 1 MG/ML(1)
AMPUL (ML) INJECTION PRN
Status: DISCONTINUED | OUTPATIENT
Start: 2024-03-08 | End: 2024-03-08 | Stop reason: ALTCHOICE

## 2024-03-08 RX ORDER — POLYETHYLENE GLYCOL 3350 17 G/17G
17 POWDER, FOR SOLUTION ORAL DAILY PRN
Status: DISCONTINUED | OUTPATIENT
Start: 2024-03-08 | End: 2024-03-09 | Stop reason: HOSPADM

## 2024-03-08 RX ORDER — SODIUM CHLORIDE 9 MG/ML
INJECTION, SOLUTION INTRAVENOUS PRN
Status: DISCONTINUED | OUTPATIENT
Start: 2024-03-08 | End: 2024-03-08 | Stop reason: HOSPADM

## 2024-03-08 RX ORDER — LIDOCAINE HYDROCHLORIDE 20 MG/ML
INJECTION, SOLUTION EPIDURAL; INFILTRATION; INTRACAUDAL; PERINEURAL PRN
Status: DISCONTINUED | OUTPATIENT
Start: 2024-03-08 | End: 2024-03-08 | Stop reason: SDUPTHER

## 2024-03-08 RX ORDER — SODIUM CHLORIDE 0.9 % (FLUSH) 0.9 %
5-40 SYRINGE (ML) INJECTION EVERY 12 HOURS SCHEDULED
Status: DISCONTINUED | OUTPATIENT
Start: 2024-03-08 | End: 2024-03-09 | Stop reason: HOSPADM

## 2024-03-08 RX ORDER — SODIUM CHLORIDE 9 MG/ML
INJECTION, SOLUTION INTRAVENOUS PRN
Status: DISCONTINUED | OUTPATIENT
Start: 2024-03-08 | End: 2024-03-09 | Stop reason: HOSPADM

## 2024-03-08 RX ORDER — EPHEDRINE SULFATE 5 MG/ML
INJECTION INTRAVENOUS PRN
Status: DISCONTINUED | OUTPATIENT
Start: 2024-03-08 | End: 2024-03-08 | Stop reason: SDUPTHER

## 2024-03-08 RX ORDER — GLYCOPYRROLATE 0.2 MG/ML
INJECTION INTRAMUSCULAR; INTRAVENOUS PRN
Status: DISCONTINUED | OUTPATIENT
Start: 2024-03-08 | End: 2024-03-08 | Stop reason: SDUPTHER

## 2024-03-08 RX ORDER — PROPOFOL 10 MG/ML
INJECTION, EMULSION INTRAVENOUS PRN
Status: DISCONTINUED | OUTPATIENT
Start: 2024-03-08 | End: 2024-03-08 | Stop reason: SDUPTHER

## 2024-03-08 RX ORDER — POTASSIUM CHLORIDE 7.45 MG/ML
10 INJECTION INTRAVENOUS PRN
Status: DISCONTINUED | OUTPATIENT
Start: 2024-03-08 | End: 2024-03-09 | Stop reason: HOSPADM

## 2024-03-08 RX ORDER — NALOXONE HYDROCHLORIDE 0.4 MG/ML
INJECTION, SOLUTION INTRAMUSCULAR; INTRAVENOUS; SUBCUTANEOUS PRN
Status: DISCONTINUED | OUTPATIENT
Start: 2024-03-08 | End: 2024-03-08 | Stop reason: HOSPADM

## 2024-03-08 RX ORDER — MIDAZOLAM HYDROCHLORIDE 2 MG/2ML
2 INJECTION, SOLUTION INTRAMUSCULAR; INTRAVENOUS
Status: DISCONTINUED | OUTPATIENT
Start: 2024-03-08 | End: 2024-03-08 | Stop reason: HOSPADM

## 2024-03-08 RX ORDER — LABETALOL HYDROCHLORIDE 5 MG/ML
INJECTION, SOLUTION INTRAVENOUS PRN
Status: DISCONTINUED | OUTPATIENT
Start: 2024-03-08 | End: 2024-03-08 | Stop reason: SDUPTHER

## 2024-03-08 RX ORDER — HYDROMORPHONE HYDROCHLORIDE 2 MG/ML
0.5 INJECTION, SOLUTION INTRAMUSCULAR; INTRAVENOUS; SUBCUTANEOUS EVERY 5 MIN PRN
Status: DISCONTINUED | OUTPATIENT
Start: 2024-03-08 | End: 2024-03-08 | Stop reason: HOSPADM

## 2024-03-08 RX ORDER — OXYCODONE HYDROCHLORIDE AND ACETAMINOPHEN 5; 325 MG/1; MG/1
1 TABLET ORAL EVERY 4 HOURS PRN
Status: DISCONTINUED | OUTPATIENT
Start: 2024-03-08 | End: 2024-03-09 | Stop reason: HOSPADM

## 2024-03-08 RX ORDER — POTASSIUM CHLORIDE 20 MEQ/1
40 TABLET, EXTENDED RELEASE ORAL PRN
Status: DISCONTINUED | OUTPATIENT
Start: 2024-03-08 | End: 2024-03-09 | Stop reason: HOSPADM

## 2024-03-08 RX ORDER — LIDOCAINE HYDROCHLORIDE 10 MG/ML
1 INJECTION, SOLUTION INFILTRATION; PERINEURAL
Status: DISCONTINUED | OUTPATIENT
Start: 2024-03-08 | End: 2024-03-08 | Stop reason: HOSPADM

## 2024-03-08 RX ORDER — ONDANSETRON 2 MG/ML
4 INJECTION INTRAMUSCULAR; INTRAVENOUS
Status: DISCONTINUED | OUTPATIENT
Start: 2024-03-08 | End: 2024-03-08 | Stop reason: HOSPADM

## 2024-03-08 RX ADMIN — LIDOCAINE HYDROCHLORIDE 100 MG: 20 INJECTION, SOLUTION EPIDURAL; INFILTRATION; INTRACAUDAL; PERINEURAL at 08:03

## 2024-03-08 RX ADMIN — EPHEDRINE SULFATE 10 MG: 5 INJECTION INTRAVENOUS at 09:50

## 2024-03-08 RX ADMIN — GLYCOPYRROLATE 0.4 MG: 0.2 INJECTION INTRAMUSCULAR; INTRAVENOUS at 09:23

## 2024-03-08 RX ADMIN — ONDANSETRON 4 MG: 2 INJECTION INTRAMUSCULAR; INTRAVENOUS at 08:19

## 2024-03-08 RX ADMIN — LABETALOL HYDROCHLORIDE 5 MG: 5 INJECTION INTRAVENOUS at 09:30

## 2024-03-08 RX ADMIN — Medication 2000 MG: at 08:09

## 2024-03-08 RX ADMIN — FENTANYL CITRATE 50 MCG: 50 INJECTION, SOLUTION INTRAMUSCULAR; INTRAVENOUS at 08:03

## 2024-03-08 RX ADMIN — EPHEDRINE SULFATE 5 MG: 5 INJECTION INTRAVENOUS at 09:57

## 2024-03-08 RX ADMIN — DEXAMETHASONE SODIUM PHOSPHATE 4 MG: 4 INJECTION INTRA-ARTICULAR; INTRALESIONAL; INTRAMUSCULAR; INTRAVENOUS; SOFT TISSUE at 08:19

## 2024-03-08 RX ADMIN — PROPOFOL 100 MG: 10 INJECTION, EMULSION INTRAVENOUS at 09:39

## 2024-03-08 RX ADMIN — Medication 3 MG: at 09:23

## 2024-03-08 RX ADMIN — PROPOFOL 130 MG: 10 INJECTION, EMULSION INTRAVENOUS at 08:03

## 2024-03-08 RX ADMIN — EPHEDRINE SULFATE 10 MG: 5 INJECTION INTRAVENOUS at 09:03

## 2024-03-08 RX ADMIN — SODIUM CHLORIDE, POTASSIUM CHLORIDE, SODIUM LACTATE AND CALCIUM CHLORIDE: 600; 310; 30; 20 INJECTION, SOLUTION INTRAVENOUS at 06:28

## 2024-03-08 RX ADMIN — HYDROMORPHONE HYDROCHLORIDE 0.2 MG: 2 INJECTION INTRAMUSCULAR; INTRAVENOUS; SUBCUTANEOUS at 08:50

## 2024-03-08 RX ADMIN — EPHEDRINE SULFATE 15 MG: 5 INJECTION INTRAVENOUS at 09:11

## 2024-03-08 RX ADMIN — SODIUM CHLORIDE, PRESERVATIVE FREE 10 ML: 5 INJECTION INTRAVENOUS at 22:13

## 2024-03-08 RX ADMIN — FENTANYL CITRATE 50 MCG: 50 INJECTION, SOLUTION INTRAMUSCULAR; INTRAVENOUS at 08:21

## 2024-03-08 RX ADMIN — EPHEDRINE SULFATE 10 MG: 5 INJECTION INTRAVENOUS at 08:38

## 2024-03-08 RX ADMIN — ROCURONIUM BROMIDE 40 MG: 10 INJECTION, SOLUTION INTRAVENOUS at 08:03

## 2024-03-08 ASSESSMENT — PAIN - FUNCTIONAL ASSESSMENT
PAIN_FUNCTIONAL_ASSESSMENT: NONE - DENIES PAIN
PAIN_FUNCTIONAL_ASSESSMENT: 0-10
PAIN_FUNCTIONAL_ASSESSMENT: NONE - DENIES PAIN
PAIN_FUNCTIONAL_ASSESSMENT: NONE - DENIES PAIN

## 2024-03-08 ASSESSMENT — PAIN SCALES - GENERAL: PAINLEVEL_OUTOF10: 0

## 2024-03-08 NOTE — H&P
CC: Chest wall skin lesion    HPI: 72 yo with large fungating chest skin mass. Presents for excision and recon    Past Medical History:   Diagnosis Date    CAD (coronary artery disease) 2022    STEMI, stent to LAD    CHF (congestive heart failure) (HCC)     2022 echo Ef 41%    Dysthymia     cymbalta    Hx of echocardiogram 03/01/2024    EF 71%    Hyperlipidemia     Hypertension     Ischemic cardiomyopathy 2022    Marijuana use     patient's wife denies    MPN (myeloproliferative neoplasm) (HCC)     MRSA infection     hx of    Smokes with greater than 40 pack year history        Past Surgical History:   Procedure Laterality Date    CARDIAC PROCEDURE N/A 11/12/2022    Coronary angiography performed by Ashkan Alford MD at [facility] CARDIAC CATH LAB    CARDIAC PROCEDURE N/A 11/12/2022    Percutaneous coronary intervention performed by Ashkan Alford MD at [facility] CARDIAC CATH LAB       Vitals:    03/08/24 0613   BP: (!) 164/82   Pulse: 61   Resp: 18   Temp: 97.9 °F (36.6 °C)   SpO2: 99%     A&O x3  RRR  Resp clear  Chest and right leg marked    A/P:  Will proceed with excision, possible flap closure possible skin graft from right leg.

## 2024-03-08 NOTE — ANESTHESIA PROCEDURE NOTES
Airway  Date/Time: 3/8/2024 8:08 AM  Urgency: elective    Airway not difficult    General Information and Staff    Patient location during procedure: OR  Resident/CRNA: Terwilliger, Chelsea, APRN - CRNA  Performed: resident/CRNA   Performed by: Terwilliger, Chelsea, APRN - CRNA  Authorized by: Chaparro Alvarado MD      Indications and Patient Condition  Indications for airway management: anesthesia  Spontaneous Ventilation: absent  Sedation level: deep  Preoxygenated: yes  Patient position: sniffing  MILS not maintained throughout  Mask difficulty assessment: vent by bag mask + OA or adjuvant +/- NMBA    Final Airway Details  Final airway type: endotracheal airway      Successful airway: ETT  Cuffed: yes   Successful intubation technique: direct laryngoscopy  Facilitating devices/methods: intubating stylet  Endotracheal tube insertion site: oral  Blade: Ridley  Blade size: #2  ETT size (mm): 8.0  Placement verified by: chest auscultation and capnometry   Measured from: lips  ETT to lips (cm): 23  Number of attempts at approach: 1  Ventilation between attempts: bag mask    Additional Comments  Atraumatic insertion, lips and teeth as preeval. Placed by JEREMY Oliveros

## 2024-03-08 NOTE — ANESTHESIA PROCEDURE NOTES
Airway  Date/Time: 3/8/2024 9:44 AM  Urgency: elective    Airway not difficult    General Information and Staff    Patient location during procedure: OR  Resident/CRNA: Terwilliger, Chelsea, APRN - CRNA  Performed: resident/CRNA   Performed by: Terwilliger, Chelsea, APRN - CRNA  Authorized by: Chaparro Alvarado MD      Indications and Patient Condition  Indications for airway management: anesthesia  Spontaneous Ventilation: absent  Sedation level: deep  Preoxygenated: yes  Patient position: sniffing  Mask difficulty assessment: vent by bag mask    Final Airway Details  Final airway type: supraglottic airway      Successful airway: oropharyngeal  Size 5     Number of attempts at approach: 1  Ventilation between attempts: supraglottic airway  Number of other approaches attempted: 0    Additional Comments  Atraumatic insertion, lips and teeth as preeval

## 2024-03-08 NOTE — PROGRESS NOTES
END OF SHIFT NOTE:    INTAKE/OUTPUT  No intake/output data recorded.  Voiding: no  Catheter: No  Drain:   Negative Pressure Wound Therapy Chest Upper (Active)   Wound Type Surgical 03/08/24 1430   Dressing Type Black Foam 03/08/24 1430   Cycle Continuous 03/08/24 1430   Target Pressure (mmHg) 125 03/08/24 1430   Dressing Status Clean, dry & intact 03/08/24 1430   Drainage Amount Scant 03/08/24 1430   Output (ml) 0 ml 03/08/24 1430               Flatus: Patient does have flatus present.    Stool: 0 occurrences.    Characteristics:                Emesis: 0  occurrences.    Characteristics:        VITAL SIGNS  Patient Vitals for the past 12 hrs:   Temp Pulse Resp BP SpO2   03/08/24 1438 97.5 °F (36.4 °C) 64 16 (!) 140/73 98 %   03/08/24 1255 -- 67 16 124/65 98 %   03/08/24 1240 -- 65 16 131/63 98 %   03/08/24 1225 -- 67 16 (!) 146/73 100 %   03/08/24 1210 -- 64 16 (!) 146/67 97 %   03/08/24 1155 -- 67 16 (!) 141/66 97 %   03/08/24 1140 -- 76 16 113/69 97 %   03/08/24 1135 -- 74 16 (!) 147/74 96 %   03/08/24 1130 -- 66 16 (!) 147/72 96 %   03/08/24 1125 -- 64 14 136/65 96 %   03/08/24 1120 -- 63 14 (!) 150/74 97 %   03/08/24 1115 -- 68 14 (!) 144/71 95 %   03/08/24 1110 -- 64 14 (!) 143/72 96 %   03/08/24 1105 98 °F (36.7 °C) 65 14 (!) 146/69 95 %   03/08/24 1100 -- 63 14 (!) 144/70 96 %   03/08/24 1055 -- 67 12 (!) 150/73 95 %   03/08/24 1050 -- 61 12 (!) 144/67 95 %   03/08/24 1045 -- 61 12 (!) 142/68 96 %   03/08/24 1043 -- 61 12 (!) 142/64 97 %   03/08/24 1035 -- 62 14 (!) 150/69 98 %   03/08/24 1030 -- 64 14 (!) 124/56 97 %   03/08/24 1025 -- 59 14 (!) 152/72 99 %   03/08/24 1018 -- 72 14 (!) 156/72 98 %   03/08/24 1015 -- 77 14 (!) 116/58 99 %   03/08/24 1010 -- 61 14 (!) 134/57 100 %   03/08/24 1008 98.3 °F (36.8 °C) 62 14 139/63 100 %   03/08/24 0613 97.9 °F (36.6 °C) 61 18 (!) 164/82 99 %       Pain Assessment  Pain Level: 0 (03/08/24 1430)     Patient's Stated Pain Goal: 0 - No pain    Ambulating  Yes    Shift

## 2024-03-08 NOTE — ANESTHESIA PRE PROCEDURE
Atherosclerotic heart disease of native coronary artery with unspecified angina pectoris I25.119   • Chronic systolic (congestive) heart failure I50.22   • Antiplatelet or antithrombotic long-term use Z79.02   • Basal cell carcinoma (BCC) of anterior chest C44.519       Past Medical History:        Diagnosis Date   • CAD (coronary artery disease) 2022    STEMI, stent to LAD   • CHF (congestive heart failure) (HCC)     2022 echo Ef 41%   • Dysthymia     cymbalta   • Hx of echocardiogram 03/01/2024    EF 71%   • Hyperlipidemia    • Hypertension    • Ischemic cardiomyopathy 2022   • Marijuana use     patient's wife denies   • MPN (myeloproliferative neoplasm) (HCC)    • MRSA infection     hx of   • Smokes with greater than 40 pack year history        Past Surgical History:        Procedure Laterality Date   • CARDIAC PROCEDURE N/A 11/12/2022    Coronary angiography performed by Ashkan Alford MD at [facility] CARDIAC CATH LAB   • CARDIAC PROCEDURE N/A 11/12/2022    Percutaneous coronary intervention performed by Ashkan Alford MD at [facility] CARDIAC CATH LAB       Social History:    Social History     Tobacco Use   • Smoking status: Every Day     Current packs/day: 1.00     Average packs/day: 1 pack/day for 60.0 years (60.0 ttl pk-yrs)     Types: Cigarettes   • Smokeless tobacco: Never   Substance Use Topics   • Alcohol use: Not Currently                                Ready to quit: Not Answered  Counseling given: Not Answered      Vital Signs (Current):   Vitals:    03/07/24 1056 03/08/24 0613   BP:  (!) 164/82   Pulse:  61   Resp:  18   Temp:  97.9 °F (36.6 °C)   TempSrc:  Oral   SpO2:  99%   Weight: 77.1 kg (170 lb)    Height: 1.753 m (5' 9\")                                               BP Readings from Last 3 Encounters:   03/08/24 (!) 164/82   03/01/24 130/80   02/16/24 (!) 140/65       NPO Status: Time of last liquid consumption: 0000                        Time of last solid consumption: 0000

## 2024-03-08 NOTE — PROGRESS NOTES
TRANSFER - IN REPORT:    Verbal report received from CLAUDIA Veras on Fox Marie  being received from PACU for Post op    Report consisted of patient's Situation, Background, Assessment and   Recommendations(SBAR).     Information from the following report(s) Surgery Report, Intake/Output, MAR, and Recent Results was reviewed with the receiving nurse.    Opportunity for questions and clarification was provided.      Assessment completed upon patient's arrival to unit and care assumed.

## 2024-03-08 NOTE — ANESTHESIA POSTPROCEDURE EVALUATION
Department of Anesthesiology  Postprocedure Note    Patient: Fox Marie  MRN: [id number]  YOB: 1950  Date of evaluation: 3/8/2024    Procedure Summary       Date: 03/08/24 Room / Location: [facility] MAIN OR 03 / [facility] MAIN OR    Anesthesia Start: 0754 Anesthesia Stop: 1008    Procedures:       MUSCLE FLAP RECONSTRUCTION CHEST DEFECT (Chest)      SPLIT THICKNESS SKIN GRAFT RIGHT THIGH TO CHEST (Right: Thigh) Diagnosis:       Basal cell carcinoma, trunk      (Basal cell carcinoma, trunk [C44.519])    Providers: Brigido Carrasquillo MD Responsible Provider: Chaparro Alvarado MD    Anesthesia Type: General ASA Status: 3            Anesthesia Type: General    Annabella Phase I: Annabella Score: 6    Annabella Phase II:      Anesthesia Post Evaluation    Patient location during evaluation: PACU  Patient participation: complete - patient participated  Level of consciousness: awake  Airway patency: patent  Nausea & Vomiting: no nausea and no vomiting  Cardiovascular status: blood pressure returned to baseline  Respiratory status: acceptable  Hydration status: euvolemic  Comments: --------------------            03/08/24               1043     --------------------   BP:     (!) 142/64   Pulse:      61       Resp:       12       Temp:                SpO2:      97%      --------------------    Pain management: adequate    No notable events documented.

## 2024-03-08 NOTE — PERIOP NOTE
TRANSFER - OUT REPORT:    Verbal report given to Lynnette TAYLOR on Fox Marie  being transferred to  210 for routine progression of patient care       Report consisted of patient’s Situation, Background, Assessment and   Recommendations(SBAR).     Information from the following report(s) Nurse Handoff Report, Adult Overview, Surgery Report, Intake/Output, MAR, Cardiac Rhythm Sinus, Procedure Verification, and Neuro Assessment was reviewed with the receiving nurse.    Lines:   Peripheral IV 03/08/24 Left;Posterior Forearm (Active)   Site Assessment Clean, dry & intact 03/08/24 1105   Line Status Flushed 03/08/24 1105   Phlebitis Assessment No symptoms 03/08/24 1105   Infiltration Assessment 0 03/08/24 1105   Dressing Status Clean, dry & intact 03/08/24 1105   Dressing Type Transparent 03/08/24 1105        Opportunity for questions and clarification was provided.      Patient transported with:   Tech  Personal Belongings    VTE prophylaxis orders have been written for Fox Marie.    Patient and family given floor number and nurses name.  Family updated re: pt status after security code verified.

## 2024-03-09 VITALS
OXYGEN SATURATION: 99 % | DIASTOLIC BLOOD PRESSURE: 66 MMHG | WEIGHT: 170 LBS | SYSTOLIC BLOOD PRESSURE: 110 MMHG | BODY MASS INDEX: 25.18 KG/M2 | HEART RATE: 58 BPM | HEIGHT: 69 IN | TEMPERATURE: 98.2 F | RESPIRATION RATE: 18 BRPM

## 2024-03-09 PROCEDURE — G0378 HOSPITAL OBSERVATION PER HR: HCPCS

## 2024-03-09 RX ORDER — OXYCODONE HYDROCHLORIDE AND ACETAMINOPHEN 5; 325 MG/1; MG/1
1 TABLET ORAL EVERY 6 HOURS PRN
Qty: 28 TABLET | Refills: 0 | Status: SHIPPED | OUTPATIENT
Start: 2024-03-09 | End: 2024-03-16

## 2024-03-09 ASSESSMENT — PAIN SCALES - GENERAL: PAINLEVEL_OUTOF10: 0

## 2024-03-09 NOTE — PROGRESS NOTES
If you need to see a   -  just ask the nurse to call us.    We look forward to serving your family!!        Chaplain Trevino

## 2024-03-09 NOTE — PROGRESS NOTES
Pt has wound vac in place. Black foam on right side is bubbling up and has bloody discharge out the side of the tegaderm. Called - Orders to place another tegaderm. Pt is expected to d/c home today.     This RN called Case managementLily, and if pt is being d/c home with wound vac there is paperwork that needs to be completed and the pt will most likely need to stay over the weekend.

## 2024-03-09 NOTE — PROGRESS NOTES
AVS reviewed with pt. Dressing to leg changed- xeroform left in place. Pt provided with supplies and instructions to change if soiled. Prevena wound vac in place, instruction booklet sent with pt. Iv removed earlier this morning by pt. Pt instructed to call 's office Monday morning to make follow up appointment. Recommend sponge bath for now until further instruction from MD.       1300: Pt wife updated on pt d/c- she does not get off work until 1400. I let her know that he can wait for her. She politely hung up the phone.     1340: Pt wife called the floor asking if he can get a ride home- the house is unlocked. Will communicate with .     1440: Pt d/c with ride set up with . [employer].

## 2024-03-09 NOTE — DISCHARGE INSTRUCTIONS
Keep wound vac in place and dry until return to office.  May replace dressing on the leg if it becomes soiled.

## 2024-03-09 NOTE — CARE COORDINATION
Pt is for discharge home today with [facility] .  Referral called/faxed to [facility] for follow up home care as ordered. OLVIN arranged a one-time ride for pt to return home ( [place]) through [employer]. CM told RN to inform pt this is a one-time ride for discharge due to cost. OLVIN received approval from CM manager, Samaria Fletcher RN to set up transport.  No additional CM orders received or supportive care needs expressed at this time.

## 2024-03-11 ENCOUNTER — CARE COORDINATION (OUTPATIENT)
Dept: CARE COORDINATION | Facility: CLINIC | Age: 74
End: 2024-03-11

## 2024-03-11 DIAGNOSIS — I10 PRIMARY HYPERTENSION: ICD-10-CM

## 2024-03-11 DIAGNOSIS — I25.10 CORONARY ARTERY DISEASE INVOLVING NATIVE CORONARY ARTERY OF NATIVE HEART WITHOUT ANGINA PECTORIS: ICD-10-CM

## 2024-03-11 RX ORDER — LOSARTAN POTASSIUM 50 MG/1
50 TABLET ORAL DAILY
Qty: 90 TABLET | Refills: 1 | OUTPATIENT
Start: 2024-03-11 | End: 2024-09-07

## 2024-03-11 NOTE — CARE COORDINATION
Care Transitions Outreach Attempt    Call within 2 business days of discharge: Yes   Attempted to reach patient for transitions of care follow up. Unable to reach patient.    Patient: Fox Marie Patient : 1950 MRN: [id number]    Last Discharge Facility       Date Complaint Diagnosis Description Type Department Provider    3/8/24  Post-operative pain Admission (Discharged) [id number] Medina MD          Was this an external facility discharge? No Discharge Facility Name: [facility]    Noted following upcoming appointments from discharge chart review:   [facility] follow up appointment(s):   Future Appointments   Date Time Provider Department Center   3/25/2024  2:00 PM [facility] XR ROOM 1 [facility]   2024  2:00 PM PERIPHERAL [facility]   2024  3:00 PM Tom Richard MD [facility]

## 2024-03-12 ENCOUNTER — CARE COORDINATION (OUTPATIENT)
Dept: CARE COORDINATION | Facility: CLINIC | Age: 74
End: 2024-03-12

## 2024-03-12 DIAGNOSIS — I10 PRIMARY HYPERTENSION: ICD-10-CM

## 2024-03-12 DIAGNOSIS — I25.10 CORONARY ARTERY DISEASE INVOLVING NATIVE CORONARY ARTERY OF NATIVE HEART WITHOUT ANGINA PECTORIS: ICD-10-CM

## 2024-03-12 RX ORDER — LOSARTAN POTASSIUM 50 MG/1
50 TABLET ORAL DAILY
Qty: 90 TABLET | Refills: 1 | Status: SHIPPED | OUTPATIENT
Start: 2024-03-12 | End: 2024-09-08

## 2024-03-12 NOTE — TELEPHONE ENCOUNTER
----- Message from Sheila Brooks sent at 3/12/2024 10:17 AM EDT -----  Subject: Refill Request    QUESTIONS  Name of Medication? losartan (COZAAR) 50 MG tablet  Patient-reported dosage and instructions? once a day  How many days do you have left? 0  Preferred Pharmacy? [id number]  Pharmacy phone number (if available)? [phone]  Additional Information for Provider? Patient went to  the script   for this. The pharmacy told them that this could not be refilled. Fox   and his sister, Gail Clarke are on phone. He gave permission to   speak with her about anything he says. He wanted his contact number   changed to hers. Patient needed this medication. Has been out for about a   week. Was in hospital.   ---------------------------------------------------------------------------  --------------  CALL BACK INFO  What is the best way for the office to contact you? OK to leave message on   voicemail  Preferred Call Back Phone Number? [phone]  ---------------------------------------------------------------------------  --------------  SCRIPT ANSWERS  Relationship to Patient? Self

## 2024-03-12 NOTE — CARE COORDINATION
Care Transitions Outreach Attempt    Call within 2 business days of discharge: Yes   Attempted to reach patient for transitions of care follow up. Unable to reach patient.    Patient: Fox Marie Patient : 1950 MRN: [id number]    Last Discharge Facility       Date Complaint Diagnosis Description Type Department Provider    3/8/24  Post-operative pain Admission (Discharged) [id number] Medina MD          Was this an external facility discharge? No Discharge Facility Name: [facility]    Noted following upcoming appointments from discharge chart review:   [facility] follow up appointment(s):   Future Appointments   Date Time Provider Department Center   3/25/2024  2:00 PM [facility] XR ROOM 1 [facility]   2024  2:00 PM PERIPHERAL [facility]   2024  3:00 PM Tom Richard MD [facility] AM[facility]

## 2024-03-20 ENCOUNTER — OFFICE VISIT (OUTPATIENT)
Dept: FAMILY MEDICINE CLINIC | Facility: CLINIC | Age: 74
End: 2024-03-20

## 2024-03-20 VITALS
WEIGHT: 176 LBS | SYSTOLIC BLOOD PRESSURE: 118 MMHG | HEIGHT: 69 IN | BODY MASS INDEX: 26.07 KG/M2 | OXYGEN SATURATION: 98 % | DIASTOLIC BLOOD PRESSURE: 68 MMHG | HEART RATE: 67 BPM

## 2024-03-20 DIAGNOSIS — I25.119 ATHEROSCLEROSIS OF NATIVE CORONARY ARTERY OF NATIVE HEART WITH ANGINA PECTORIS (HCC): ICD-10-CM

## 2024-03-20 DIAGNOSIS — C44.519 BASAL CELL CARCINOMA (BCC) OF ANTERIOR CHEST: ICD-10-CM

## 2024-03-20 DIAGNOSIS — Z72.0 TOBACCO ABUSE: Chronic | ICD-10-CM

## 2024-03-20 DIAGNOSIS — N17.9 AKI (ACUTE KIDNEY INJURY) (HCC): Primary | ICD-10-CM

## 2024-03-20 DIAGNOSIS — I10 PRIMARY HYPERTENSION: ICD-10-CM

## 2024-03-20 PROBLEM — J96.01 ACUTE RESPIRATORY FAILURE WITH HYPOXIA (HCC): Status: RESOLVED | Noted: 2022-12-28 | Resolved: 2024-03-20

## 2024-03-20 PROBLEM — I50.22 CHRONIC SYSTOLIC (CONGESTIVE) HEART FAILURE (HCC): Status: RESOLVED | Noted: 2023-11-13 | Resolved: 2024-03-20

## 2024-03-20 LAB
ANION GAP SERPL CALC-SCNC: 6 MMOL/L (ref 2–11)
BUN SERPL-MCNC: 19 MG/DL (ref 8–23)
CALCIUM SERPL-MCNC: 9.1 MG/DL (ref 8.3–10.4)
CHLORIDE SERPL-SCNC: 108 MMOL/L (ref 103–113)
CO2 SERPL-SCNC: 24 MMOL/L (ref 21–32)
CREAT SERPL-MCNC: 1.6 MG/DL (ref 0.8–1.5)
GLUCOSE SERPL-MCNC: 183 MG/DL (ref 65–100)
POTASSIUM SERPL-SCNC: 4.1 MMOL/L (ref 3.5–5.1)
SODIUM SERPL-SCNC: 138 MMOL/L (ref 136–146)

## 2024-03-20 ASSESSMENT — ENCOUNTER SYMPTOMS: RESPIRATORY NEGATIVE: 1

## 2024-03-20 NOTE — PROGRESS NOTES
PROGRESS NOTE    SUBJECTIVE:   Fox Marie is a 73 y.o. male seen for a follow up visit regarding   Chief Complaint   Patient presents with    Follow-Up from Hospital        HPI:  Here for follow-up after being in the hospital for excision of a very large basal cell cancer on the chest.  Since discharge she has been receiving home health care 3 days a week for dressing changes.  He reports that the wound care folks are very happy with the progress that he is made, healing faster than anticipated.  He feels well today, voices no complaints.  Hospital records and labs are reviewed.  Kidney function labs noted, consistent with KEVIN         Reviewed and updated this visit by provider:           Review of Systems   Respiratory: Negative.     Cardiovascular: Negative.           OBJECTIVE:  Vitals:    03/20/24 1409   BP: 118/68   Site: Left Upper Arm   Position: Sitting   Cuff Size: Medium Adult   Pulse: 67   SpO2: 98%   Weight: 79.8 kg (176 lb)   Height: 1.753 m (5' 9\")        Physical Exam   General: Alert and oriented x3, well-appearing  Neck: No adenopathy, thyromegaly or thyroid nodules  Pulmonary: Normal effort, good airflow, no rales or rhonchi  CVS: Regular rate and rhythm, normal S1, S2, no S3 or S4, no murmurs; no carotid bruits, 2+ pedal pulses  Skin: Wounds are dressed today, dressings are clean and dry    Medical problems and test results were reviewed with the patient today.     Recent Results (from the past 672 hour(s))   Echo (TTE) complete (PRN contrast/bubble/strain/3D)    Collection Time: 03/01/24  1:55 PM   Result Value Ref Range    IVSd 1.4 (A) 0.6 - 1.0 cm    LVIDd 4.9 4.2 - 5.9 cm    LVIDs 3.6 cm    LVPWd 1.6 (A) 0.6 - 1.0 cm    EF BP 71 55 - 100 %    LV Ejection Fraction A2C 68 %    LV Ejection Fraction A4C 71 %    LV EDV A2C 84 mL    LV EDV A4C 60 mL    LV EDV BP 74 67 - 155 mL    LV ESV A2C 27 mL    LV ESV A4C 18 mL    LV ESV BP 22 22 - 58 mL    LVOT Peak Gradient 5 mmHg    LVOT Peak

## 2024-03-22 NOTE — OP NOTE
Operative Note      Patient: Fox Marie  YOB: 1950  MRN: [id number]    Date of Procedure: 3/8/2024    Pre-Op Diagnosis Codes:     * Basal cell carcinoma, trunk [C44.519]    Post-Op Diagnosis: Same       Procedure:  Excision of chest lesion with margins 20 x 20 cm  Complex closure of chest lesion 20 x 20 cm  Split thickness skin graft from right thigh to chest      Surgeon(s):  Brigido Carrasquillo MD    Assistant:   Surgical Assistant: Suzy Tomas    Anesthesia: General    Estimated Blood Loss (mL): 20 ml    Complications: Minimal    Specimens:   ID Type Source Tests Collected by Time Destination   A : chest mass Tissue Chest SURGICAL PATHOLOGY Brigido Carrasquillo MD 3/8/2024 0855    B : chest margins superior Tissue Chest SURGICAL PATHOLOGY Brigido Carrasquillo MD 3/8/2024 0905    C : chest margins left Tissue Chest SURGICAL PATHOLOGY Brigido Carrasquillo MD 3/8/2024 0905    D : chest margins deep Tissue Chest SURGICAL PATHOLOGY Brigido Carrasquillo MD 3/8/2024 0907    E : chest margins right Tissue Chest SURGICAL PATHOLOGY Brigido Carrasquillo MD 3/8/2024 0907    F : chest margins inferior Tissue Chest SURGICAL PATHOLOGY Brigido Carrasquillo MD 3/8/2024 0912        Implants:  * No implants in log *      Drains:   Negative Pressure Wound Therapy Chest Upper (Active)   Wound Type Surgical 03/09/24 0730   Dressing Type Black Foam 03/09/24 0730   Cycle Continuous 03/09/24 0730   Target Pressure (mmHg) 125 03/09/24 0730   Dressing Status Intact;New drainage noted 03/09/24 0730   Drainage Amount Scant 03/09/24 0730   Drainage Description Sanguinous 03/09/24 1224   Output (ml) 300 ml 03/09/24 1224         Detailed Description of Procedure:     Prior to the procedure the patient was met in holding. Once again a discussion of the risks, benefits and alternatives was conducted including but not limited to bleeding, infection, failure of the surgery, need for further procedures, disappointing or unacceptable

## 2024-03-25 ENCOUNTER — HOSPITAL ENCOUNTER (OUTPATIENT)
Dept: GENERAL RADIOLOGY | Age: 74
Discharge: HOME | End: 2024-03-28
Attending: INTERNAL MEDICINE
Payer: MEDICARE

## 2024-03-25 DIAGNOSIS — D47.2 MGUS (MONOCLONAL GAMMOPATHY OF UNKNOWN SIGNIFICANCE): ICD-10-CM

## 2024-03-25 PROCEDURE — 77075 RADEX OSSEOUS SURVEY COMPL: CPT

## 2024-04-02 DIAGNOSIS — R73.9 HYPERGLYCEMIA: Primary | ICD-10-CM

## 2024-04-05 ENCOUNTER — TELEPHONE (OUTPATIENT)
Dept: ONCOLOGY | Age: 74
End: 2024-04-05

## 2024-04-05 ENCOUNTER — TELEPHONE (OUTPATIENT)
Dept: FAMILY MEDICINE CLINIC | Facility: CLINIC | Age: 74
End: 2024-04-05

## 2024-04-05 NOTE — TELEPHONE ENCOUNTER
Physician provider: Tom Richard MD  Reason for today's call: Results  Last office visit:02/16/2024    Patient notified that their information will be routed to the [facility] clinical triage team for review. Patient is advised that they will receive a phone call from the triage department. If symptoms worsen before receiving a call back, the patient has been advised to proceed to the nearest ED.     Pts sister called stating that the pt had a bone scan completed on 03/25/2024 and was wondering if someone could give her a call with the results. She says the pts phone is broke and he cannot receive and ingoing or outgoing calls, but states he can still receive texts. She states that the pts spouse is no longer in the picture so the sister is helping the pt now.

## 2024-04-05 NOTE — TELEPHONE ENCOUNTER
Sister requesting call to review results of bone survey that was done on 3/25/24. Message to dr. Almonte team.  Patient with upcoming appointment on 4/18/24.

## 2024-04-05 NOTE — TELEPHONE ENCOUNTER
Looks as though Oncologist Dr. Richard ordered imaging.    Contacted patients sister and advised to contacted Oncology regarding results. Voiced understanding and thanks.

## 2024-04-08 NOTE — TELEPHONE ENCOUNTER
Call back to Gail to notify that Dr. Richard will review all results at his upcoming appointment next week. She appreciated the call back. She also mentions that he will sign an JESSICA form, adding her name to the list, when they come for their appointment

## 2024-04-18 ENCOUNTER — HOSPITAL ENCOUNTER (OUTPATIENT)
Dept: LAB | Age: 74
Discharge: HOME | End: 2024-04-18
Payer: MEDICARE

## 2024-04-18 ENCOUNTER — OFFICE VISIT (OUTPATIENT)
Dept: ONCOLOGY | Age: 74
End: 2024-04-18
Payer: MEDICARE

## 2024-04-18 VITALS
DIASTOLIC BLOOD PRESSURE: 58 MMHG | OXYGEN SATURATION: 99 % | RESPIRATION RATE: 16 BRPM | TEMPERATURE: 98.2 F | SYSTOLIC BLOOD PRESSURE: 115 MMHG | WEIGHT: 179.8 LBS | HEIGHT: 69 IN | BODY MASS INDEX: 26.63 KG/M2 | HEART RATE: 53 BPM

## 2024-04-18 DIAGNOSIS — D75.1 POLYCYTHEMIA: ICD-10-CM

## 2024-04-18 DIAGNOSIS — E55.9 VITAMIN D DEFICIENCY: ICD-10-CM

## 2024-04-18 DIAGNOSIS — D72.829 LEUKOCYTOSIS, UNSPECIFIED TYPE: ICD-10-CM

## 2024-04-18 DIAGNOSIS — D47.2 MGUS (MONOCLONAL GAMMOPATHY OF UNKNOWN SIGNIFICANCE): Primary | ICD-10-CM

## 2024-04-18 DIAGNOSIS — F17.210 CIGARETTE SMOKER: ICD-10-CM

## 2024-04-18 DIAGNOSIS — D47.1 MPN (MYELOPROLIFERATIVE NEOPLASM) (HCC): ICD-10-CM

## 2024-04-18 DIAGNOSIS — D47.2 MGUS (MONOCLONAL GAMMOPATHY OF UNKNOWN SIGNIFICANCE): ICD-10-CM

## 2024-04-18 DIAGNOSIS — D75.1 POLYCYTHEMIA: Primary | ICD-10-CM

## 2024-04-18 DIAGNOSIS — E78.49 OTHER HYPERLIPIDEMIA: ICD-10-CM

## 2024-04-18 PROBLEM — I50.22 CHRONIC SYSTOLIC (CONGESTIVE) HEART FAILURE (HCC): Status: ACTIVE | Noted: 2024-04-18

## 2024-04-18 LAB
25(OH)D3 SERPL-MCNC: 74.7 NG/ML (ref 30–100)
ALBUMIN SERPL-MCNC: 3.9 G/DL (ref 3.2–4.6)
ALBUMIN/GLOB SERPL: 1.1 (ref 0.4–1.6)
ALP SERPL-CCNC: 146 U/L (ref 50–136)
ALT SERPL-CCNC: 36 U/L (ref 12–65)
ANION GAP SERPL CALC-SCNC: 3 MMOL/L (ref 2–11)
AST SERPL-CCNC: 20 U/L (ref 15–37)
BASOPHILS # BLD: 0.1 K/UL (ref 0–0.2)
BASOPHILS NFR BLD: 1 % (ref 0–2)
BILIRUB SERPL-MCNC: 0.3 MG/DL (ref 0.2–1.1)
BUN SERPL-MCNC: 28 MG/DL (ref 8–23)
CALCIUM SERPL-MCNC: 9.4 MG/DL (ref 8.3–10.4)
CHLORIDE SERPL-SCNC: 111 MMOL/L (ref 103–113)
CO2 SERPL-SCNC: 24 MMOL/L (ref 21–32)
CREAT SERPL-MCNC: 1.4 MG/DL (ref 0.8–1.5)
DIFFERENTIAL METHOD BLD: ABNORMAL
EOSINOPHIL # BLD: 0.3 K/UL (ref 0–0.8)
EOSINOPHIL NFR BLD: 2 % (ref 0.5–7.8)
ERYTHROCYTE [DISTWIDTH] IN BLOOD BY AUTOMATED COUNT: 14.1 % (ref 11.9–14.6)
FERRITIN SERPL-MCNC: 8 NG/ML (ref 8–388)
GLOBULIN SER CALC-MCNC: 3.6 G/DL (ref 2.8–4.5)
GLUCOSE SERPL-MCNC: 112 MG/DL (ref 65–100)
HCT VFR BLD AUTO: 36.2 % (ref 41.1–50.3)
HGB BLD-MCNC: 11.7 G/DL (ref 13.6–17.2)
IMM GRANULOCYTES # BLD AUTO: 0 K/UL (ref 0–0.5)
IMM GRANULOCYTES NFR BLD AUTO: 0 % (ref 0–5)
LYMPHOCYTES # BLD: 2.3 K/UL (ref 0.5–4.6)
LYMPHOCYTES NFR BLD: 21 % (ref 13–44)
MCH RBC QN AUTO: 27.8 PG (ref 26.1–32.9)
MCHC RBC AUTO-ENTMCNC: 32.3 G/DL (ref 31.4–35)
MCV RBC AUTO: 86 FL (ref 82–102)
MONOCYTES # BLD: 1.3 K/UL (ref 0.1–1.3)
MONOCYTES NFR BLD: 11 % (ref 4–12)
NEUTS SEG # BLD: 7.1 K/UL (ref 1.7–8.2)
NEUTS SEG NFR BLD: 65 % (ref 43–78)
NRBC # BLD: 0 K/UL (ref 0–0.2)
PLATELET # BLD AUTO: 408 K/UL (ref 150–450)
PMV BLD AUTO: 9.2 FL (ref 9.4–12.3)
POTASSIUM SERPL-SCNC: 3.6 MMOL/L (ref 3.5–5.1)
PROT SERPL-MCNC: 7.5 G/DL (ref 6.3–8.2)
RBC # BLD AUTO: 4.21 M/UL (ref 4.23–5.6)
SODIUM SERPL-SCNC: 138 MMOL/L (ref 136–146)
WBC # BLD AUTO: 11.1 K/UL (ref 4.3–11.1)

## 2024-04-18 PROCEDURE — 85025 COMPLETE CBC W/AUTO DIFF WBC: CPT

## 2024-04-18 PROCEDURE — 1123F ACP DISCUSS/DSCN MKR DOCD: CPT | Performed by: INTERNAL MEDICINE

## 2024-04-18 PROCEDURE — 82784 ASSAY IGA/IGD/IGG/IGM EACH: CPT

## 2024-04-18 PROCEDURE — 82728 ASSAY OF FERRITIN: CPT

## 2024-04-18 PROCEDURE — 83521 IG LIGHT CHAINS FREE EACH: CPT

## 2024-04-18 PROCEDURE — 3074F SYST BP LT 130 MM HG: CPT | Performed by: INTERNAL MEDICINE

## 2024-04-18 PROCEDURE — 82306 VITAMIN D 25 HYDROXY: CPT

## 2024-04-18 PROCEDURE — G8427 DOCREV CUR MEDS BY ELIG CLIN: HCPCS | Performed by: INTERNAL MEDICINE

## 2024-04-18 PROCEDURE — 3078F DIAST BP <80 MM HG: CPT | Performed by: INTERNAL MEDICINE

## 2024-04-18 PROCEDURE — 36415 COLL VENOUS BLD VENIPUNCTURE: CPT

## 2024-04-18 PROCEDURE — 3017F COLORECTAL CA SCREEN DOC REV: CPT | Performed by: INTERNAL MEDICINE

## 2024-04-18 PROCEDURE — 82232 ASSAY OF BETA-2 PROTEIN: CPT

## 2024-04-18 PROCEDURE — 99215 OFFICE O/P EST HI 40 MIN: CPT | Performed by: INTERNAL MEDICINE

## 2024-04-18 PROCEDURE — 86334 IMMUNOFIX E-PHORESIS SERUM: CPT

## 2024-04-18 PROCEDURE — 84165 PROTEIN E-PHORESIS SERUM: CPT

## 2024-04-18 PROCEDURE — G8417 CALC BMI ABV UP PARAM F/U: HCPCS | Performed by: INTERNAL MEDICINE

## 2024-04-18 PROCEDURE — 4004F PT TOBACCO SCREEN RCVD TLK: CPT | Performed by: INTERNAL MEDICINE

## 2024-04-18 PROCEDURE — 80053 COMPREHEN METABOLIC PANEL: CPT

## 2024-04-18 NOTE — PROGRESS NOTES
[place] [id number]  Phone: [phone]           4/18/2024  Fox Marie  1950  [id number]        Fox Marie is a 73 year old  man who has returned to my clinic by Dr. Byron Villa for evaluation of Leukocytosis, his Myeloid Disorders Profile and FISH for bcr/abl were unremarkable; he is a cigarette smoker. In 2/2024 he was found to have MGUS, IgA Lambda ( high-risk group ).        ALLERGIES:    Codeine.        FAMILY HISTORY:     No hematologic disorders.        SOCIAL HISTORY:     He is  and lives with his wife. He is a retired . He smokes 10 cigarettes every day.        PAST MEDICAL HISTORY:    Hypertension, Coronary Artery Disease, Hyperlipidemia, Myocardial Infarction, s/p PTCA, Congestive Heart Failure, COPD, Pulmonary Arterial Hypertension, MGUS, Iron Deficiency Anemia and Vitamin D deficiency.        ROS:  The patient complained of fatigue and exertional dyspnea; all other systems negative.        PHYSICAL EXAM:   The patient was alert, awake and oriented, no acute distress was noted. Oral examination revealed an edentulous mouth, no mucosal lesions were seen. Lymph node examination did not reveal any adenopathy. Neck examination revealed a supple neck, no thyromegaly or masses were noted. Chest examination revealed slightly prolonged expiration bilaterally. Heart examination revealed S-1 and S-2 with a 2/6 systolic murmur. Abdominal examination revealed a non-tender abdomen, bowel sounds were positive, no organomegaly could be appreciated. Examination of the extremities did not reveal any tenderness or erythema. Examination of the skin did not reveal any lesions.        KPS:   80.        LABORATORY INVESTIGATIONS:  CBC showed a WBC count of 11.1, ANC was 7.1, Hemoglobin was 11.7 and Platelets were 408; his Ferritin level was only 8. Medical problems and test results were reviewed with the patient today.        ASSESSMENT:

## 2024-04-18 NOTE — PATIENT INSTRUCTIONS
Patient Information from Today's Visit        Treatment Summary has been discussed and given to patient:N/A  Follow Up:   We will bring you back in July for a follow up with  and lab work prior.    Please refer to After Visit Summary or markedup for upcoming appointment information. If you have any questions regarding your upcoming schedule please reach out to your care team through markedup or call [phone].          -------------------------------------------------------------------------------------------------------------------  Please call our office at [phone] if you have any  of the following symptoms:   Fever of 100.5 or greater  Chills  Shortness of breath  Swelling or pain in one leg    After office hours an answering service is available and will contact a provider for emergencies or if you are experiencing any of the above symptoms.    Patient has My Chart.  My Chart log in information explained on the after visit summary printout at the check-out desk.    Nery Goodrich MA

## 2024-04-19 LAB
B2 MICROGLOB SERPL-MCNC: 2.7 MG/L (ref 0.6–2.4)
KAPPA LC FREE SER-MCNC: 40.8 MG/L (ref 3.3–19.4)
KAPPA LC FREE/LAMBDA FREE SER: 0.53 (ref 0.26–1.65)
LAMBDA LC FREE SERPL-MCNC: 76.5 MG/L (ref 5.7–26.3)

## 2024-04-24 LAB
ALBUMIN SERPL ELPH-MCNC: 3.5 G/DL (ref 2.9–4.4)
ALBUMIN/GLOB SERPL: 1.1 (ref 0.7–1.7)
ALPHA1 GLOB SERPL ELPH-MCNC: 0.3 G/DL (ref 0–0.4)
ALPHA2 GLOB SERPL ELPH-MCNC: 0.8 G/DL (ref 0.4–1)
B-GLOBULIN SERPL ELPH-MCNC: 1.5 G/DL (ref 0.7–1.3)
GAMMA GLOB SERPL ELPH-MCNC: 0.9 G/DL (ref 0.4–1.8)
GLOBULIN SER-MCNC: 3.4 G/DL (ref 2.2–3.9)
IGA SERPL-MCNC: 669 MG/DL (ref 61–437)
IGG SERPL-MCNC: 1053 MG/DL (ref 603–1613)
IGM SERPL-MCNC: 49 MG/DL (ref 15–143)
INTERPRETATION SERPL IEP-IMP: ABNORMAL
M PROTEIN SERPL ELPH-MCNC: 0.4 G/DL
PROT SERPL-MCNC: 6.9 G/DL (ref 6–8.5)

## 2024-05-09 ENCOUNTER — NURSE ONLY (OUTPATIENT)
Dept: FAMILY MEDICINE CLINIC | Facility: CLINIC | Age: 74
End: 2024-05-09

## 2024-05-09 DIAGNOSIS — R73.9 HYPERGLYCEMIA: ICD-10-CM

## 2024-05-09 DIAGNOSIS — I10 PRIMARY HYPERTENSION: ICD-10-CM

## 2024-05-09 DIAGNOSIS — E78.5 HYPERLIPIDEMIA, UNSPECIFIED HYPERLIPIDEMIA TYPE: ICD-10-CM

## 2024-05-09 DIAGNOSIS — Z11.59 ENCOUNTER FOR HEPATITIS C SCREENING TEST FOR LOW RISK PATIENT: ICD-10-CM

## 2024-05-09 LAB
ALBUMIN SERPL-MCNC: 3.7 G/DL (ref 3.2–4.6)
ALBUMIN/GLOB SERPL: 1.1 (ref 1–1.9)
ALP SERPL-CCNC: 146 U/L (ref 40–129)
ALT SERPL-CCNC: 45 U/L (ref 12–65)
AST SERPL-CCNC: 33 U/L (ref 15–37)
BILIRUB DIRECT SERPL-MCNC: <0.2 MG/DL (ref 0–0.4)
BILIRUB SERPL-MCNC: 0.2 MG/DL (ref 0–1.2)
CHOLEST SERPL-MCNC: 117 MG/DL (ref 0–200)
EST. AVERAGE GLUCOSE BLD GHB EST-MCNC: 141 MG/DL
GLOBULIN SER CALC-MCNC: 3.3 G/DL (ref 2.3–3.5)
GLUCOSE SERPL-MCNC: 120 MG/DL (ref 70–99)
HBA1C MFR BLD: 6.6 % (ref 0–5.6)
HCV AB SER QL: NONREACTIVE
HDLC SERPL-MCNC: 36 MG/DL (ref 40–60)
HDLC SERPL: 3.3 (ref 0–5)
LDLC SERPL CALC-MCNC: 60 MG/DL (ref 0–100)
PROT SERPL-MCNC: 7 G/DL (ref 6.3–8.2)
TRIGL SERPL-MCNC: 108 MG/DL (ref 0–150)
VLDLC SERPL CALC-MCNC: 22 MG/DL (ref 6–23)

## 2024-05-21 DIAGNOSIS — I25.10 CORONARY ARTERY DISEASE INVOLVING NATIVE CORONARY ARTERY OF NATIVE HEART WITHOUT ANGINA PECTORIS: ICD-10-CM

## 2024-05-21 DIAGNOSIS — I25.5 ISCHEMIC CARDIOMYOPATHY: ICD-10-CM

## 2024-05-21 DIAGNOSIS — F34.1 DYSTHYMIA: ICD-10-CM

## 2024-05-21 NOTE — TELEPHONE ENCOUNTER
Patient requesting refill on furosemide (LASIX) 40 MG tablet , DULoxetine (CYMBALTA) 30 MG extended release capsule , metoprolol succinate (TOPROL XL) 50 MG extended release tablet to [facility]

## 2024-05-25 RX ORDER — METOPROLOL SUCCINATE 50 MG/1
50 TABLET, EXTENDED RELEASE ORAL DAILY
Qty: 90 TABLET | Refills: 3 | Status: SHIPPED | OUTPATIENT
Start: 2024-05-25 | End: 2025-05-25

## 2024-05-25 RX ORDER — DULOXETIN HYDROCHLORIDE 30 MG/1
30 CAPSULE, DELAYED RELEASE ORAL DAILY
Qty: 90 CAPSULE | Refills: 3 | Status: SHIPPED | OUTPATIENT
Start: 2024-05-25 | End: 2025-05-25

## 2024-05-25 RX ORDER — FUROSEMIDE 40 MG/1
40 TABLET ORAL DAILY
Qty: 90 TABLET | Refills: 3 | Status: SHIPPED | OUTPATIENT
Start: 2024-05-25 | End: 2025-05-25

## 2024-06-04 DIAGNOSIS — E11.22 TYPE 2 DIABETES MELLITUS WITH STAGE 3 CHRONIC KIDNEY DISEASE, UNSPECIFIED WHETHER LONG TERM INSULIN USE, UNSPECIFIED WHETHER STAGE 3A OR 3B CKD (HCC): Primary | ICD-10-CM

## 2024-06-04 DIAGNOSIS — N18.30 TYPE 2 DIABETES MELLITUS WITH STAGE 3 CHRONIC KIDNEY DISEASE, UNSPECIFIED WHETHER LONG TERM INSULIN USE, UNSPECIFIED WHETHER STAGE 3A OR 3B CKD (HCC): Primary | ICD-10-CM

## 2024-06-05 RX ORDER — DAPAGLIFLOZIN 5 MG/1
5 TABLET, FILM COATED ORAL EVERY MORNING
Qty: 90 TABLET | Refills: 3 | Status: SHIPPED | OUTPATIENT
Start: 2024-06-05 | End: 2025-06-05

## 2024-06-11 ENCOUNTER — TELEPHONE (OUTPATIENT)
Dept: ONCOLOGY | Age: 74
End: 2024-06-11

## 2024-06-11 NOTE — TELEPHONE ENCOUNTER
Patients sister, Gail, contacted and advised provider switched Farxiga to Januvia due to cost. Sister voiced understanding and thanks.

## 2024-06-11 NOTE — TELEPHONE ENCOUNTER
Physician provider: Dr. Richard  Reason for today's call (Please detail here patients chief complaint): RS appt  Last office visit:N/A  Preferred pharmacy (If refill request): N/A  Calls to office within the last 48 hours?:No    Patient notified that their information will be routed to the [facility] clinical triage team for review. Patient is advised that they will receive a phone call from the triage department. If symptom related and symptoms worsen before receiving a call back, the patient has been advised to proceed to the nearest ED.      Pt's sister called to RS his appt on 7/18.  She would like a call back.

## 2024-06-18 ENCOUNTER — TELEPHONE (OUTPATIENT)
Dept: ONCOLOGY | Age: 74
End: 2024-06-18

## 2024-07-10 DIAGNOSIS — I25.10 CORONARY ARTERY DISEASE INVOLVING NATIVE CORONARY ARTERY OF NATIVE HEART WITHOUT ANGINA PECTORIS: ICD-10-CM

## 2024-07-10 DIAGNOSIS — E78.5 HYPERLIPIDEMIA, UNSPECIFIED HYPERLIPIDEMIA TYPE: ICD-10-CM

## 2024-07-10 DIAGNOSIS — F34.1 DYSTHYMIA: ICD-10-CM

## 2024-07-10 DIAGNOSIS — I10 PRIMARY HYPERTENSION: ICD-10-CM

## 2024-07-10 NOTE — TELEPHONE ENCOUNTER
Patient contacted and scheduled appointment for 8/7/24. Advised short supply would be sent to local pharmacy now and after his appointment we could get the others sent over to Mail Order pharmacy. Voiced understanding.

## 2024-07-10 NOTE — TELEPHONE ENCOUNTER
Patient called and stated he needed a refill on amLODIPine (NORVASC) 5 MG tablet , atorvastatin (LIPITOR) 80 MG tablet clopidogrel (PLAVIX) 75 MG tablet to [facility] wants to do a 30 day supply and then would like all future prescriptions and refills to go through [facility] pharmacy

## 2024-07-11 DIAGNOSIS — I25.10 CORONARY ARTERY DISEASE INVOLVING NATIVE CORONARY ARTERY OF NATIVE HEART WITHOUT ANGINA PECTORIS: ICD-10-CM

## 2024-07-11 DIAGNOSIS — I10 PRIMARY HYPERTENSION: ICD-10-CM

## 2024-07-11 DIAGNOSIS — E78.5 HYPERLIPIDEMIA, UNSPECIFIED HYPERLIPIDEMIA TYPE: ICD-10-CM

## 2024-07-11 RX ORDER — AMLODIPINE BESYLATE 5 MG/1
5 TABLET ORAL DAILY
Qty: 30 TABLET | Refills: 0 | Status: SHIPPED | OUTPATIENT
Start: 2024-07-11 | End: 2024-08-10

## 2024-07-11 RX ORDER — CLOPIDOGREL BISULFATE 75 MG/1
75 TABLET ORAL DAILY
Qty: 90 TABLET | OUTPATIENT
Start: 2024-07-11 | End: 2024-08-10

## 2024-07-11 RX ORDER — AMLODIPINE BESYLATE 5 MG/1
5 TABLET ORAL DAILY
Qty: 90 TABLET | OUTPATIENT
Start: 2024-07-11 | End: 2024-08-10

## 2024-07-11 RX ORDER — DULOXETIN HYDROCHLORIDE 30 MG/1
30 CAPSULE, DELAYED RELEASE ORAL DAILY
Qty: 30 CAPSULE | Refills: 0 | Status: SHIPPED | OUTPATIENT
Start: 2024-07-11 | End: 2024-08-10

## 2024-07-11 RX ORDER — CLOPIDOGREL BISULFATE 75 MG/1
75 TABLET ORAL DAILY
Qty: 30 TABLET | Refills: 0 | Status: SHIPPED | OUTPATIENT
Start: 2024-07-11 | End: 2024-08-10

## 2024-07-11 RX ORDER — ATORVASTATIN CALCIUM 80 MG/1
80 TABLET, FILM COATED ORAL NIGHTLY
Qty: 90 TABLET | OUTPATIENT
Start: 2024-07-11

## 2024-07-11 RX ORDER — ATORVASTATIN CALCIUM 80 MG/1
80 TABLET, FILM COATED ORAL NIGHTLY
Qty: 30 TABLET | Refills: 0 | Status: SHIPPED | OUTPATIENT
Start: 2024-07-11 | End: 2024-08-10

## 2024-07-16 DIAGNOSIS — D47.2 MGUS (MONOCLONAL GAMMOPATHY OF UNKNOWN SIGNIFICANCE): ICD-10-CM

## 2024-07-16 DIAGNOSIS — D72.829 LEUKOCYTOSIS, UNSPECIFIED TYPE: ICD-10-CM

## 2024-07-16 DIAGNOSIS — D75.1 POLYCYTHEMIA: Primary | ICD-10-CM

## 2024-07-16 DIAGNOSIS — E55.9 VITAMIN D DEFICIENCY, UNSPECIFIED: ICD-10-CM

## 2024-07-16 DIAGNOSIS — D50.8 OTHER IRON DEFICIENCY ANEMIA: ICD-10-CM

## 2024-07-17 ENCOUNTER — OFFICE VISIT (OUTPATIENT)
Dept: ONCOLOGY | Age: 74
End: 2024-07-17
Payer: MEDICARE

## 2024-07-17 ENCOUNTER — HOSPITAL ENCOUNTER (OUTPATIENT)
Dept: LAB | Age: 74
Discharge: HOME | End: 2024-07-20
Payer: MEDICARE

## 2024-07-17 VITALS
OXYGEN SATURATION: 97 % | SYSTOLIC BLOOD PRESSURE: 100 MMHG | HEIGHT: 69 IN | RESPIRATION RATE: 16 BRPM | WEIGHT: 186.6 LBS | DIASTOLIC BLOOD PRESSURE: 55 MMHG | HEART RATE: 70 BPM | BODY MASS INDEX: 27.64 KG/M2 | TEMPERATURE: 98.4 F

## 2024-07-17 DIAGNOSIS — D72.829 LEUKOCYTOSIS, UNSPECIFIED TYPE: ICD-10-CM

## 2024-07-17 DIAGNOSIS — D75.1 POLYCYTHEMIA: ICD-10-CM

## 2024-07-17 DIAGNOSIS — D50.8 OTHER IRON DEFICIENCY ANEMIA: ICD-10-CM

## 2024-07-17 DIAGNOSIS — E55.9 VITAMIN D DEFICIENCY: ICD-10-CM

## 2024-07-17 DIAGNOSIS — D47.2 MGUS (MONOCLONAL GAMMOPATHY OF UNKNOWN SIGNIFICANCE): ICD-10-CM

## 2024-07-17 DIAGNOSIS — E78.49 OTHER HYPERLIPIDEMIA: ICD-10-CM

## 2024-07-17 DIAGNOSIS — F17.210 CIGARETTE SMOKER: ICD-10-CM

## 2024-07-17 DIAGNOSIS — E55.9 VITAMIN D DEFICIENCY, UNSPECIFIED: ICD-10-CM

## 2024-07-17 DIAGNOSIS — D47.2 MGUS (MONOCLONAL GAMMOPATHY OF UNKNOWN SIGNIFICANCE): Primary | ICD-10-CM

## 2024-07-17 PROBLEM — E11.9 TYPE 2 DIABETES MELLITUS (HCC): Status: ACTIVE | Noted: 2024-07-17

## 2024-07-17 LAB
25(OH)D3 SERPL-MCNC: 35.8 NG/ML (ref 30–100)
ALBUMIN SERPL-MCNC: 3.8 G/DL (ref 3.2–4.6)
ALBUMIN/GLOB SERPL: 1 (ref 1–1.9)
ALP SERPL-CCNC: 161 U/L (ref 40–129)
ALT SERPL-CCNC: 17 U/L (ref 12–65)
ANION GAP SERPL CALC-SCNC: 15 MMOL/L (ref 9–18)
AST SERPL-CCNC: 17 U/L (ref 15–37)
BASOPHILS # BLD: 0.1 K/UL (ref 0–0.2)
BASOPHILS NFR BLD: 1 % (ref 0–2)
BILIRUB SERPL-MCNC: 0.3 MG/DL (ref 0–1.2)
BUN SERPL-MCNC: 21 MG/DL (ref 8–23)
CALCIUM SERPL-MCNC: 9.8 MG/DL (ref 8.8–10.2)
CHLORIDE SERPL-SCNC: 106 MMOL/L (ref 98–107)
CO2 SERPL-SCNC: 20 MMOL/L (ref 20–28)
CREAT SERPL-MCNC: 1.71 MG/DL (ref 0.8–1.3)
DIFFERENTIAL METHOD BLD: ABNORMAL
EOSINOPHIL # BLD: 0.2 K/UL (ref 0–0.8)
EOSINOPHIL NFR BLD: 1 % (ref 0.5–7.8)
ERYTHROCYTE [DISTWIDTH] IN BLOOD BY AUTOMATED COUNT: 19 % (ref 11.9–14.6)
FERRITIN SERPL-MCNC: 14 NG/ML (ref 8–388)
GLOBULIN SER CALC-MCNC: 3.8 G/DL (ref 2.3–3.5)
GLUCOSE SERPL-MCNC: 105 MG/DL (ref 70–99)
HCT VFR BLD AUTO: 43.8 % (ref 41.1–50.3)
HGB BLD-MCNC: 14.4 G/DL (ref 13.6–17.2)
IMM GRANULOCYTES # BLD AUTO: 0.2 K/UL (ref 0–0.5)
IMM GRANULOCYTES NFR BLD AUTO: 1 % (ref 0–5)
KAPPA LC FREE SER-MCNC: 63 MG/L (ref 2.4–20.7)
KAPPA LC FREE/LAMBDA FREE SER: 0.4 (ref 0.2–0.8)
LAMBDA LC FREE SERPL-MCNC: 168 MG/L (ref 4.2–27.7)
LYMPHOCYTES # BLD: 2.7 K/UL (ref 0.5–4.6)
LYMPHOCYTES NFR BLD: 18 % (ref 13–44)
MCH RBC QN AUTO: 27.3 PG (ref 26.1–32.9)
MCHC RBC AUTO-ENTMCNC: 32.9 G/DL (ref 31.4–35)
MCV RBC AUTO: 83.1 FL (ref 82–102)
MONOCYTES # BLD: 1.8 K/UL (ref 0.1–1.3)
MONOCYTES NFR BLD: 12 % (ref 4–12)
NEUTS SEG # BLD: 10 K/UL (ref 1.7–8.2)
NEUTS SEG NFR BLD: 67 % (ref 43–78)
NRBC # BLD: 0 K/UL (ref 0–0.2)
PLATELET # BLD AUTO: 487 K/UL (ref 150–450)
PMV BLD AUTO: 9.6 FL (ref 9.4–12.3)
POTASSIUM SERPL-SCNC: 3.9 MMOL/L (ref 3.5–5.1)
PROT SERPL-MCNC: 7.6 G/DL (ref 6.3–8.2)
RBC # BLD AUTO: 5.27 M/UL (ref 4.23–5.6)
SODIUM SERPL-SCNC: 141 MMOL/L (ref 136–145)
WBC # BLD AUTO: 14.9 K/UL (ref 4.3–11.1)

## 2024-07-17 PROCEDURE — 84165 PROTEIN E-PHORESIS SERUM: CPT

## 2024-07-17 PROCEDURE — 82728 ASSAY OF FERRITIN: CPT

## 2024-07-17 PROCEDURE — 85025 COMPLETE CBC W/AUTO DIFF WBC: CPT

## 2024-07-17 PROCEDURE — 3074F SYST BP LT 130 MM HG: CPT | Performed by: INTERNAL MEDICINE

## 2024-07-17 PROCEDURE — 80053 COMPREHEN METABOLIC PANEL: CPT

## 2024-07-17 PROCEDURE — 3078F DIAST BP <80 MM HG: CPT | Performed by: INTERNAL MEDICINE

## 2024-07-17 PROCEDURE — 82232 ASSAY OF BETA-2 PROTEIN: CPT

## 2024-07-17 PROCEDURE — 82306 VITAMIN D 25 HYDROXY: CPT

## 2024-07-17 PROCEDURE — 1123F ACP DISCUSS/DSCN MKR DOCD: CPT | Performed by: INTERNAL MEDICINE

## 2024-07-17 PROCEDURE — 83521 IG LIGHT CHAINS FREE EACH: CPT

## 2024-07-17 PROCEDURE — G8417 CALC BMI ABV UP PARAM F/U: HCPCS | Performed by: INTERNAL MEDICINE

## 2024-07-17 PROCEDURE — 4004F PT TOBACCO SCREEN RCVD TLK: CPT | Performed by: INTERNAL MEDICINE

## 2024-07-17 PROCEDURE — 36415 COLL VENOUS BLD VENIPUNCTURE: CPT

## 2024-07-17 PROCEDURE — 99215 OFFICE O/P EST HI 40 MIN: CPT | Performed by: INTERNAL MEDICINE

## 2024-07-17 PROCEDURE — 82784 ASSAY IGA/IGD/IGG/IGM EACH: CPT

## 2024-07-17 PROCEDURE — G8427 DOCREV CUR MEDS BY ELIG CLIN: HCPCS | Performed by: INTERNAL MEDICINE

## 2024-07-17 PROCEDURE — 86334 IMMUNOFIX E-PHORESIS SERUM: CPT

## 2024-07-17 NOTE — PROGRESS NOTES
[place] [id number]  Phone: [phone]           7/17/2024  Fox Marie  1950  [id number]        Fox Marie is a 74 year old  man who has returned to my clinic for a follow-up visit; he was initially referred to me by Dr. Byron Villa for evaluation of Leukocytosis, his Myeloid Disorders Profile and FISH for bcr/abl were unremarkable; he is a cigarette smoker. In 2/2024 he was found to have MGUS, IgA Lambda ( high-risk group ).        ALLERGIES:    Codeine.        FAMILY HISTORY:     No hematologic disorders.        SOCIAL HISTORY:     He is  and lives with his wife. He is a retired . He smokes 10 cigarettes every day.        PAST MEDICAL HISTORY:    Hypertension, Coronary Artery Disease, Hyperlipidemia, Myocardial Infarction, s/p PTCA, Congestive Heart Failure, COPD, Pulmonary Arterial Hypertension, MGUS, Iron Deficiency Anemia and Vitamin D deficiency.        ROS:  The patient complained of fatigue and exertional dyspnea; all other systems negative.        PHYSICAL EXAM:   The patient was alert, awake and oriented, no acute distress was noted. Oral examination revealed an edentulous mouth, no mucosal lesions were seen. Lymph node examination did not reveal any adenopathy. Neck examination revealed a supple neck, no thyromegaly or masses were noted. Chest examination revealed slightly prolonged expiration bilaterally. Heart examination revealed S-1 and S-2 with a 2/6 systolic murmur. Abdominal examination revealed a non-tender abdomen, bowel sounds were positive, no organomegaly could be appreciated. Examination of the extremities did not reveal any tenderness or erythema. Examination of the skin did not reveal any lesions.        KPS:   80.        LABORATORY INVESTIGATIONS:  CBC showed a WBC count of 14.9, ANC was 10.1, Hemoglobin was 14.4 and Platelets were 487; his Ferritin level was only 14. Medical problems and test results

## 2024-07-17 NOTE — PATIENT INSTRUCTIONS
Patient Information from Today's Visit    The members of your [facility] are listed below:    Physician Provider: Tom Richard Medical Oncologist  Advanced Practice Clinician: Lizzette Cameron NP  Registered Nurse: N/A  Navigator: N/A  Medical Assistant: Nery ARGUELLES MA and Kiana REYES MA  : Siobhan ARGUELLES   Supportive Care Services: Dylon PARRISH LMSW    Diagnosis:   MGUS      Follow Up Instructions:   Lab work looks stable today  Please start taking over the counter Vitamin D  We will bring you back in November for a follow up with  and lab work prior.      Treatment Summary has been discussed and given to patient:N/A      Current Labs:   Hospital Outpatient Visit on 07/17/2024   Component Date Value Ref Range Status    KAPPA FREE LIGHT CHAIN 07/17/2024 63.0 (H)  2.4 - 20.7 mg/L Final    LAMBDA FREE LIGHT CHAIN 07/17/2024 168.0 (H)  4.2 - 27.7 mg/L Final    Kappa/Lambda Fluid C Ratio 07/17/2024 0.4  0.2 - 0.8   Final    Ferritin 07/17/2024 14  8 - 388 NG/ML Final    Sodium 07/17/2024 141  136 - 145 mmol/L Final    Potassium 07/17/2024 3.9  3.5 - 5.1 mmol/L Final    Chloride 07/17/2024 106  98 - 107 mmol/L Final    CO2 07/17/2024 20  20 - 28 mmol/L Final    Anion Gap 07/17/2024 15  9 - 18 mmol/L Final    Glucose 07/17/2024 105 (H)  70 - 99 mg/dL Final    Comment: <70 mg/dL Consistent with, but not fully diagnostic of hypoglycemia.  100 - 125 mg/dL Impaired fasting glucose/consistent with pre-diabetes mellitus.  > 126 mg/dl Fasting glucose consistent with overt diabetes mellitus      BUN 07/17/2024 21  8 - 23 MG/DL Final    Creatinine 07/17/2024 1.71 (H)  0.80 - 1.30 MG/DL Final    Est, Glom Filt Rate 07/17/2024 41 (L)  >60 ml/min/1.73m2 Final    Comment:    Pediatric calculator link: https://www.kidney.org/professionals/kdoqi/gfr_calculatorped     These results are not intended for use in patients <18 years of age.     eGFR results are calculated without a race factor using  the 2021 CKD-EPI

## 2024-07-19 LAB — B2 MICROGLOB SERPL-MCNC: 3.1 MG/L (ref 0.6–2.4)

## 2024-07-21 LAB
ALBUMIN SERPL ELPH-MCNC: 3.5 G/DL (ref 2.9–4.4)
ALBUMIN/GLOB SERPL: 1 (ref 0.7–1.7)
ALPHA1 GLOB SERPL ELPH-MCNC: 0.3 G/DL (ref 0–0.4)
ALPHA2 GLOB SERPL ELPH-MCNC: 0.8 G/DL (ref 0.4–1)
B-GLOBULIN SERPL ELPH-MCNC: 1.7 G/DL (ref 0.7–1.3)
GAMMA GLOB SERPL ELPH-MCNC: 1.1 G/DL (ref 0.4–1.8)
GLOBULIN SER-MCNC: 3.8 G/DL (ref 2.2–3.9)
IGA SERPL-MCNC: 668 MG/DL (ref 61–437)
IGG SERPL-MCNC: 1068 MG/DL (ref 603–1613)
IGM SERPL-MCNC: 42 MG/DL (ref 15–143)
INTERPRETATION SERPL IEP-IMP: ABNORMAL
M PROTEIN SERPL ELPH-MCNC: 0.7 G/DL
PROT SERPL-MCNC: 7.3 G/DL (ref 6–8.5)

## 2024-07-30 NOTE — PLAN OF CARE
Problem: Chronic Conditions and Co-morbidities  Goal: Patient's chronic conditions and co-morbidity symptoms are monitored and maintained or improved  3/9/2024 0043 by Parisa Figueroa RN  Outcome: Progressing  3/8/2024 1511 by Lynnette Oneill RN  Outcome: Progressing     Problem: Pain  Goal: Verbalizes/displays adequate comfort level or baseline comfort level  3/9/2024 0043 by Parisa Figueroa RN  Outcome: Progressing  3/8/2024 1511 by Lynnette Oneill RN  Outcome: Progressing     Problem: Discharge Planning  Goal: Discharge to home or other facility with appropriate resources  3/9/2024 0043 by Parisa Figueroa RN  Outcome: Progressing  3/8/2024 1511 by Lynnette Oneill RN  Outcome: Progressing     Problem: ABCDS Injury Assessment  Goal: Absence of physical injury  3/9/2024 0043 by Parisa Figueroa RN  Outcome: Progressing  3/8/2024 1511 by Lynnette Oneill RN  Outcome: Progressing     Problem: Safety - Adult  Goal: Free from fall injury  Outcome: Progressing     
  Problem: Chronic Conditions and Co-morbidities  Goal: Patient's chronic conditions and co-morbidity symptoms are monitored and maintained or improved  3/9/2024 0736 by Lynnette Oneill RN  Outcome: Progressing  3/9/2024 0043 by Parisa Figueroa RN  Outcome: Progressing     Problem: Pain  Goal: Verbalizes/displays adequate comfort level or baseline comfort level  3/9/2024 0736 by Lynnette Oneill, RN  Outcome: Progressing  3/9/2024 0043 by Parisa Figueroa RN  Outcome: Progressing     Problem: Discharge Planning  Goal: Discharge to home or other facility with appropriate resources  3/9/2024 0736 by Lynnette Oneill RN  Outcome: Progressing  3/9/2024 0043 by Parisa Figueroa RN  Outcome: Progressing     Problem: ABCDS Injury Assessment  Goal: Absence of physical injury  3/9/2024 0736 by Lynnette Oneill, RN  Outcome: Progressing  3/9/2024 0043 by Parisa Figueroa RN  Outcome: Progressing     Problem: Safety - Adult  Goal: Free from fall injury  3/9/2024 0736 by Lynnette Oneill, RN  Outcome: Progressing  3/9/2024 0043 by Parisa Figueroa RN  Outcome: Progressing     
  Problem: Chronic Conditions and Co-morbidities  Goal: Patient's chronic conditions and co-morbidity symptoms are monitored and maintained or improved  Outcome: Progressing     Problem: Pain  Goal: Verbalizes/displays adequate comfort level or baseline comfort level  Outcome: Progressing     Problem: Discharge Planning  Goal: Discharge to home or other facility with appropriate resources  Outcome: Progressing     Problem: ABCDS Injury Assessment  Goal: Absence of physical injury  Outcome: Progressing     
Initiate Treatment: Triamcinolone bid x 2 weeks
Render In Strict Bullet Format?: No
Detail Level: Zone

## 2024-08-07 ENCOUNTER — OFFICE VISIT (OUTPATIENT)
Dept: FAMILY MEDICINE CLINIC | Facility: CLINIC | Age: 74
End: 2024-08-07
Payer: MEDICARE

## 2024-08-07 VITALS
WEIGHT: 187.8 LBS | HEIGHT: 69 IN | HEART RATE: 70 BPM | SYSTOLIC BLOOD PRESSURE: 116 MMHG | BODY MASS INDEX: 27.81 KG/M2 | OXYGEN SATURATION: 95 % | DIASTOLIC BLOOD PRESSURE: 78 MMHG

## 2024-08-07 DIAGNOSIS — E11.22 CKD STAGE 3 DUE TO TYPE 2 DIABETES MELLITUS (HCC): ICD-10-CM

## 2024-08-07 DIAGNOSIS — N18.31 TYPE 2 DIABETES MELLITUS WITH STAGE 3A CHRONIC KIDNEY DISEASE, WITHOUT LONG-TERM CURRENT USE OF INSULIN (HCC): Primary | ICD-10-CM

## 2024-08-07 DIAGNOSIS — E78.5 HYPERLIPIDEMIA, UNSPECIFIED HYPERLIPIDEMIA TYPE: ICD-10-CM

## 2024-08-07 DIAGNOSIS — E11.22 TYPE 2 DIABETES MELLITUS WITH STAGE 3A CHRONIC KIDNEY DISEASE, WITHOUT LONG-TERM CURRENT USE OF INSULIN (HCC): Primary | ICD-10-CM

## 2024-08-07 DIAGNOSIS — F34.1 DYSTHYMIA: ICD-10-CM

## 2024-08-07 DIAGNOSIS — N18.30 CKD STAGE 3 DUE TO TYPE 2 DIABETES MELLITUS (HCC): ICD-10-CM

## 2024-08-07 DIAGNOSIS — I25.10 CORONARY ARTERY DISEASE INVOLVING NATIVE CORONARY ARTERY OF NATIVE HEART WITHOUT ANGINA PECTORIS: ICD-10-CM

## 2024-08-07 DIAGNOSIS — I10 PRIMARY HYPERTENSION: ICD-10-CM

## 2024-08-07 DIAGNOSIS — I25.5 ISCHEMIC CARDIOMYOPATHY: ICD-10-CM

## 2024-08-07 PROBLEM — I50.22 CHRONIC SYSTOLIC (CONGESTIVE) HEART FAILURE (HCC): Status: RESOLVED | Noted: 2024-04-18 | Resolved: 2024-08-07

## 2024-08-07 PROCEDURE — 99214 OFFICE O/P EST MOD 30 MIN: CPT | Performed by: FAMILY MEDICINE

## 2024-08-07 PROCEDURE — 1123F ACP DISCUSS/DSCN MKR DOCD: CPT | Performed by: FAMILY MEDICINE

## 2024-08-07 PROCEDURE — G0439 PPPS, SUBSEQ VISIT: HCPCS | Performed by: FAMILY MEDICINE

## 2024-08-07 PROCEDURE — 3074F SYST BP LT 130 MM HG: CPT | Performed by: FAMILY MEDICINE

## 2024-08-07 PROCEDURE — G8417 CALC BMI ABV UP PARAM F/U: HCPCS | Performed by: FAMILY MEDICINE

## 2024-08-07 PROCEDURE — G8427 DOCREV CUR MEDS BY ELIG CLIN: HCPCS | Performed by: FAMILY MEDICINE

## 2024-08-07 PROCEDURE — 3078F DIAST BP <80 MM HG: CPT | Performed by: FAMILY MEDICINE

## 2024-08-07 PROCEDURE — 4004F PT TOBACCO SCREEN RCVD TLK: CPT | Performed by: FAMILY MEDICINE

## 2024-08-07 PROCEDURE — 3044F HG A1C LEVEL LT 7.0%: CPT | Performed by: FAMILY MEDICINE

## 2024-08-07 RX ORDER — ATORVASTATIN CALCIUM 80 MG/1
80 TABLET, FILM COATED ORAL NIGHTLY
Qty: 90 TABLET | Refills: 3 | Status: SHIPPED | OUTPATIENT
Start: 2024-08-07 | End: 2025-08-07

## 2024-08-07 RX ORDER — CLOPIDOGREL BISULFATE 75 MG/1
75 TABLET ORAL DAILY
Qty: 90 TABLET | Refills: 3 | Status: SHIPPED | OUTPATIENT
Start: 2024-08-07 | End: 2025-08-07

## 2024-08-07 RX ORDER — NITROGLYCERIN 0.4 MG/1
0.4 TABLET SUBLINGUAL EVERY 5 MIN PRN
Qty: 25 TABLET | Refills: 3 | Status: SHIPPED | OUTPATIENT
Start: 2024-08-07

## 2024-08-07 RX ORDER — METOPROLOL SUCCINATE 50 MG/1
50 TABLET, EXTENDED RELEASE ORAL DAILY
Qty: 90 TABLET | Refills: 3 | Status: SHIPPED | OUTPATIENT
Start: 2024-08-07 | End: 2025-08-07

## 2024-08-07 RX ORDER — AMLODIPINE BESYLATE 5 MG/1
5 TABLET ORAL DAILY
Qty: 90 TABLET | Refills: 3 | Status: SHIPPED | OUTPATIENT
Start: 2024-08-07 | End: 2025-08-07

## 2024-08-07 RX ORDER — FUROSEMIDE 40 MG/1
40 TABLET ORAL DAILY
Qty: 90 TABLET | Refills: 3 | Status: SHIPPED | OUTPATIENT
Start: 2024-08-07 | End: 2025-08-07

## 2024-08-07 RX ORDER — LOSARTAN POTASSIUM 50 MG/1
50 TABLET ORAL DAILY
Qty: 90 TABLET | Refills: 3 | Status: SHIPPED | OUTPATIENT
Start: 2024-08-07 | End: 2025-08-07

## 2024-08-07 RX ORDER — DULOXETIN HYDROCHLORIDE 30 MG/1
30 CAPSULE, DELAYED RELEASE ORAL DAILY
Qty: 90 CAPSULE | Refills: 3 | Status: SHIPPED | OUTPATIENT
Start: 2024-08-07 | End: 2025-08-07

## 2024-08-07 ASSESSMENT — PATIENT HEALTH QUESTIONNAIRE - PHQ9
6. FEELING BAD ABOUT YOURSELF - OR THAT YOU ARE A FAILURE OR HAVE LET YOURSELF OR YOUR FAMILY DOWN: NOT AT ALL
8. MOVING OR SPEAKING SO SLOWLY THAT OTHER PEOPLE COULD HAVE NOTICED. OR THE OPPOSITE, BEING SO FIGETY OR RESTLESS THAT YOU HAVE BEEN MOVING AROUND A LOT MORE THAN USUAL: NOT AT ALL
SUM OF ALL RESPONSES TO PHQ QUESTIONS 1-9: 0
9. THOUGHTS THAT YOU WOULD BE BETTER OFF DEAD, OR OF HURTING YOURSELF: NOT AT ALL
SUM OF ALL RESPONSES TO PHQ QUESTIONS 1-9: 0
10. IF YOU CHECKED OFF ANY PROBLEMS, HOW DIFFICULT HAVE THESE PROBLEMS MADE IT FOR YOU TO DO YOUR WORK, TAKE CARE OF THINGS AT HOME, OR GET ALONG WITH OTHER PEOPLE: NOT DIFFICULT AT ALL
5. POOR APPETITE OR OVEREATING: NOT AT ALL
3. TROUBLE FALLING OR STAYING ASLEEP: NOT AT ALL
SUM OF ALL RESPONSES TO PHQ9 QUESTIONS 1 & 2: 0
1. LITTLE INTEREST OR PLEASURE IN DOING THINGS: NOT AT ALL
2. FEELING DOWN, DEPRESSED OR HOPELESS: NOT AT ALL
SUM OF ALL RESPONSES TO PHQ QUESTIONS 1-9: 0
SUM OF ALL RESPONSES TO PHQ QUESTIONS 1-9: 0
7. TROUBLE CONCENTRATING ON THINGS, SUCH AS READING THE NEWSPAPER OR WATCHING TELEVISION: NOT AT ALL
4. FEELING TIRED OR HAVING LITTLE ENERGY: NOT AT ALL

## 2024-08-07 ASSESSMENT — LIFESTYLE VARIABLES
HOW OFTEN DO YOU HAVE A DRINK CONTAINING ALCOHOL: NEVER
HOW MANY STANDARD DRINKS CONTAINING ALCOHOL DO YOU HAVE ON A TYPICAL DAY: PATIENT DOES NOT DRINK

## 2024-08-07 ASSESSMENT — ENCOUNTER SYMPTOMS: RESPIRATORY NEGATIVE: 1

## 2024-08-07 NOTE — PROGRESS NOTES
911. Yes Byron Villa MD   empagliflozin (JARDIANCE) 10 MG tablet Take 1 tablet by mouth daily Yes Byron Villa MD   Cyanocobalamin (VITAMIN B-12 CR PO) Take by mouth daily Yes Provider, MD Khadar   vitamin D (ERGOCALCIFEROL) 1.25 MG (98137 UT) CAPS capsule Take 1 capsule by mouth daily for 14 days Yes Tom Richard MD   albuterol sulfate HFA (PROVENTIL;VENTOLIN;PROAIR) 108 (90 Base) MCG/ACT inhaler Inhale 2 puffs into the lungs every 6 hours as needed for Wheezing or Shortness of Breath Yes Byron Villa MD   aspirin 81 MG chewable tablet Take 1 tablet by mouth daily Yes Melissa Pappas, PA   dapagliflozin (FARXIGA) 5 MG tablet Take 1 tablet by mouth every morning  Patient not taking: Reported on 6/11/2024  Byron Villa MD       [facility] (Including outside providers/suppliers regularly involved in providing care):   Patient Care Team:  Byron Villa MD as PCP - General (Family Medicine)  Byron Villa MD as PCP - Empaneled Provider      Reviewed and updated this visit:  Tobacco  Allergies  Meds  Med Hx  Surg Hx  Soc Hx  Fam Hx

## 2024-08-07 NOTE — PATIENT INSTRUCTIONS
about stop-smoking programs and medicines. These can increase your chances of quitting for good. Quitting is one of the most important things you can do to protect your heart. It is never too late to quit. Try to avoid secondhand smoke too.     Stay at a weight that's healthy for you. Talk to your doctor if you need help losing weight.     Try to get 7 to 9 hours of sleep each night.     Limit alcohol to 2 drinks a day for men and 1 drink a day for women. Too much alcohol can cause health problems.     Manage other health problems such as diabetes, high blood pressure, and high cholesterol. If you think you may have a problem with alcohol or drug use, talk to your doctor.   Medicines    Take your medicines exactly as prescribed. Call your doctor if you think you are having a problem with your medicine.     If your doctor recommends aspirin, take the amount directed each day. Make sure you take aspirin and not another kind of pain reliever, such as acetaminophen (Tylenol).   When should you call for help?   Call 911 if you have symptoms of a heart attack. These may include:    Chest pain or pressure, or a strange feeling in the chest.     Sweating.     Shortness of breath.     Pain, pressure, or a strange feeling in the back, neck, jaw, or upper belly or in one or both shoulders or arms.     Lightheadedness or sudden weakness.     A fast or irregular heartbeat.   After you call 911, the  may tell you to chew 1 adult-strength or 2 to 4 low-dose aspirin. Wait for an ambulance. Do not try to drive yourself.  Watch closely for changes in your health, and be sure to contact your doctor if you have any problems.  Where can you learn more?  Go to https://www.[employer].net/patientEd and enter F075 to learn more about \"A Healthy Heart: Care Instructions.\"  Current as of: June 24, 2023  Content Version: 14.1  © 5790-7730 [employer].   Care instructions adapted under license by [employer]. If you have

## 2024-10-29 ENCOUNTER — TELEPHONE (OUTPATIENT)
Dept: ONCOLOGY | Age: 74
End: 2024-10-29

## 2024-11-22 DIAGNOSIS — E78.49 OTHER HYPERLIPIDEMIA: ICD-10-CM

## 2024-11-22 DIAGNOSIS — D72.829 LEUKOCYTOSIS, UNSPECIFIED TYPE: ICD-10-CM

## 2024-11-22 DIAGNOSIS — D47.2 MGUS (MONOCLONAL GAMMOPATHY OF UNKNOWN SIGNIFICANCE): ICD-10-CM

## 2024-11-22 DIAGNOSIS — D75.1 POLYCYTHEMIA: ICD-10-CM

## 2024-11-22 DIAGNOSIS — E55.9 VITAMIN D DEFICIENCY: Primary | ICD-10-CM

## 2024-11-22 DIAGNOSIS — D50.8 OTHER IRON DEFICIENCY ANEMIAS: ICD-10-CM

## 2024-11-26 ENCOUNTER — OFFICE VISIT (OUTPATIENT)
Dept: FAMILY MEDICINE CLINIC | Facility: CLINIC | Age: 74
End: 2024-11-26
Payer: MEDICARE

## 2024-11-26 VITALS
DIASTOLIC BLOOD PRESSURE: 84 MMHG | BODY MASS INDEX: 28.56 KG/M2 | HEART RATE: 65 BPM | HEIGHT: 69 IN | WEIGHT: 192.8 LBS | SYSTOLIC BLOOD PRESSURE: 128 MMHG | OXYGEN SATURATION: 97 %

## 2024-11-26 DIAGNOSIS — I25.10 CORONARY ARTERY DISEASE INVOLVING NATIVE CORONARY ARTERY OF NATIVE HEART WITHOUT ANGINA PECTORIS: ICD-10-CM

## 2024-11-26 DIAGNOSIS — I10 PRIMARY HYPERTENSION: Primary | ICD-10-CM

## 2024-11-26 DIAGNOSIS — N18.31 TYPE 2 DIABETES MELLITUS WITH STAGE 3A CHRONIC KIDNEY DISEASE, WITHOUT LONG-TERM CURRENT USE OF INSULIN (HCC): ICD-10-CM

## 2024-11-26 DIAGNOSIS — E11.22 TYPE 2 DIABETES MELLITUS WITH STAGE 3A CHRONIC KIDNEY DISEASE, WITHOUT LONG-TERM CURRENT USE OF INSULIN (HCC): ICD-10-CM

## 2024-11-26 PROBLEM — I50.31 ACUTE HEART FAILURE WITH PRESERVED EJECTION FRACTION (HCC): Status: RESOLVED | Noted: 2022-12-28 | Resolved: 2024-11-26

## 2024-11-26 LAB
ALBUMIN SERPL-MCNC: 3.3 G/DL (ref 3.2–4.6)
ALBUMIN/GLOB SERPL: 1 (ref 1–1.9)
ALP SERPL-CCNC: 143 U/L (ref 40–129)
ALT SERPL-CCNC: 33 U/L (ref 8–55)
ANION GAP SERPL CALC-SCNC: 12 MMOL/L (ref 7–16)
AST SERPL-CCNC: 24 U/L (ref 15–37)
BASOPHILS # BLD: 0.1 K/UL (ref 0–0.2)
BASOPHILS NFR BLD: 1 % (ref 0–2)
BILIRUB SERPL-MCNC: <0.2 MG/DL (ref 0–1.2)
BUN SERPL-MCNC: 14 MG/DL (ref 8–23)
CALCIUM SERPL-MCNC: 9.1 MG/DL (ref 8.8–10.2)
CHLORIDE SERPL-SCNC: 107 MMOL/L (ref 98–107)
CO2 SERPL-SCNC: 24 MMOL/L (ref 20–29)
CREAT SERPL-MCNC: 1.15 MG/DL (ref 0.8–1.3)
DIFFERENTIAL METHOD BLD: NORMAL
EOSINOPHIL # BLD: 0.3 K/UL (ref 0–0.8)
EOSINOPHIL NFR BLD: 3 % (ref 0.5–7.8)
ERYTHROCYTE [DISTWIDTH] IN BLOOD BY AUTOMATED COUNT: 14.6 % (ref 11.9–14.6)
EST. AVERAGE GLUCOSE BLD GHB EST-MCNC: 155 MG/DL
GLOBULIN SER CALC-MCNC: 3.3 G/DL (ref 2.3–3.5)
GLUCOSE SERPL-MCNC: 95 MG/DL (ref 70–99)
HBA1C MFR BLD: 7 % (ref 0–5.6)
HCT VFR BLD AUTO: 42.5 % (ref 41.1–50.3)
HGB BLD-MCNC: 13.8 G/DL (ref 13.6–17.2)
IMM GRANULOCYTES # BLD AUTO: 0.1 K/UL (ref 0–0.5)
IMM GRANULOCYTES NFR BLD AUTO: 1 % (ref 0–5)
LYMPHOCYTES # BLD: 2.3 K/UL (ref 0.5–4.6)
LYMPHOCYTES NFR BLD: 22 % (ref 13–44)
MCH RBC QN AUTO: 28.8 PG (ref 26.1–32.9)
MCHC RBC AUTO-ENTMCNC: 32.5 G/DL (ref 31.4–35)
MCV RBC AUTO: 88.5 FL (ref 82–102)
MONOCYTES # BLD: 1.2 K/UL (ref 0.1–1.3)
MONOCYTES NFR BLD: 11 % (ref 4–12)
NEUTS SEG # BLD: 6.5 K/UL (ref 1.7–8.2)
NEUTS SEG NFR BLD: 62 % (ref 43–78)
NRBC # BLD: 0 K/UL (ref 0–0.2)
PLATELET # BLD AUTO: 349 K/UL (ref 150–450)
PMV BLD AUTO: 10.1 FL (ref 9.4–12.3)
POTASSIUM SERPL-SCNC: 4.2 MMOL/L (ref 3.5–5.1)
PROT SERPL-MCNC: 6.6 G/DL (ref 6.3–8.2)
RBC # BLD AUTO: 4.8 M/UL (ref 4.23–5.6)
SODIUM SERPL-SCNC: 143 MMOL/L (ref 136–145)
WBC # BLD AUTO: 10.5 K/UL (ref 4.3–11.1)

## 2024-11-26 PROCEDURE — G8417 CALC BMI ABV UP PARAM F/U: HCPCS | Performed by: FAMILY MEDICINE

## 2024-11-26 PROCEDURE — 3044F HG A1C LEVEL LT 7.0%: CPT | Performed by: FAMILY MEDICINE

## 2024-11-26 PROCEDURE — 1160F RVW MEDS BY RX/DR IN RCRD: CPT | Performed by: FAMILY MEDICINE

## 2024-11-26 PROCEDURE — 4004F PT TOBACCO SCREEN RCVD TLK: CPT | Performed by: FAMILY MEDICINE

## 2024-11-26 PROCEDURE — 3074F SYST BP LT 130 MM HG: CPT | Performed by: FAMILY MEDICINE

## 2024-11-26 PROCEDURE — 99214 OFFICE O/P EST MOD 30 MIN: CPT | Performed by: FAMILY MEDICINE

## 2024-11-26 PROCEDURE — 1123F ACP DISCUSS/DSCN MKR DOCD: CPT | Performed by: FAMILY MEDICINE

## 2024-11-26 PROCEDURE — 3079F DIAST BP 80-89 MM HG: CPT | Performed by: FAMILY MEDICINE

## 2024-11-26 PROCEDURE — 1159F MED LIST DOCD IN RCRD: CPT | Performed by: FAMILY MEDICINE

## 2024-11-26 PROCEDURE — G8427 DOCREV CUR MEDS BY ELIG CLIN: HCPCS | Performed by: FAMILY MEDICINE

## 2024-11-26 PROCEDURE — G8484 FLU IMMUNIZE NO ADMIN: HCPCS | Performed by: FAMILY MEDICINE

## 2024-11-26 ASSESSMENT — ENCOUNTER SYMPTOMS: RESPIRATORY NEGATIVE: 1

## 2024-11-26 NOTE — PROGRESS NOTES
PROGRESS NOTE    SUBJECTIVE:   Fox Marie is a 74 y.o. male seen for a follow up visit regarding   Chief Complaint   Patient presents with    Diabetes     Follow up        HPI:  Here for follow-up of chronic problems, overall doing well, he is very active, has been doing a lot of deer hunting, walking through the woods, climbing deer stands, no chest pain or shortness of breath reported.         Reviewed and updated this visit by provider:  Tobacco  Allergies  Meds  Problems  Med Hx  Surg Hx  Fam Hx           Review of Systems   Respiratory: Negative.     Cardiovascular: Negative.           OBJECTIVE:  Vitals:    11/26/24 1427   BP: 128/84   Site: Left Upper Arm   Cuff Size: Medium Adult   Pulse: 65   SpO2: 97%   Weight: 87.5 kg (192 lb 12.8 oz)   Height: 1.753 m (5' 9.02\")        Physical Exam   General: Alert and oriented x3, well-appearing  Neck: No adenopathy, thyromegaly or thyroid nodules  Pulmonary: Normal effort, good airflow, no rales or rhonchi  CVS: Regular rate and rhythm, normal S1, S2, no S3 or S4, no murmurs; no carotid bruits, 2+ pedal pulses      Medical problems and test results were reviewed with the patient today.     No results found for this or any previous visit (from the past 672 hour(s)).    No results found for any visits on 11/26/24.     ASSESSMENT and PLAN    1. Primary hypertension, at goal  -     Comprehensive Metabolic Panel; Future  2. Type 2 diabetes mellitus with stage 3a chronic kidney disease, without long-term current use of insulin (HCC), well-controlled  -     CBC with Auto Differential; Future  -     Comprehensive Metabolic Panel; Future  -     Hemoglobin A1C; Future  3. Coronary artery disease involving native coronary artery of native heart without angina pectoris, remains very active without angina or anginal equivalent symptoms  -     Comprehensive Metabolic Panel; Future         No follow-ups on file.       Byron Villa MD

## 2025-02-26 ENCOUNTER — OFFICE VISIT (OUTPATIENT)
Dept: FAMILY MEDICINE CLINIC | Facility: CLINIC | Age: 75
End: 2025-02-26
Payer: MEDICARE

## 2025-02-26 VITALS
DIASTOLIC BLOOD PRESSURE: 80 MMHG | RESPIRATION RATE: 16 BRPM | BODY MASS INDEX: 28.08 KG/M2 | SYSTOLIC BLOOD PRESSURE: 124 MMHG | HEIGHT: 69 IN | WEIGHT: 189.6 LBS

## 2025-02-26 DIAGNOSIS — I25.10 CORONARY ARTERY DISEASE INVOLVING NATIVE CORONARY ARTERY OF NATIVE HEART WITHOUT ANGINA PECTORIS: ICD-10-CM

## 2025-02-26 DIAGNOSIS — Z12.5 SCREENING FOR PROSTATE CANCER: ICD-10-CM

## 2025-02-26 DIAGNOSIS — N18.31 TYPE 2 DIABETES MELLITUS WITH STAGE 3A CHRONIC KIDNEY DISEASE, WITHOUT LONG-TERM CURRENT USE OF INSULIN (HCC): Primary | ICD-10-CM

## 2025-02-26 DIAGNOSIS — N40.1 BENIGN PROSTATIC HYPERPLASIA WITH URINARY FREQUENCY: ICD-10-CM

## 2025-02-26 DIAGNOSIS — R35.0 BENIGN PROSTATIC HYPERPLASIA WITH URINARY FREQUENCY: ICD-10-CM

## 2025-02-26 DIAGNOSIS — E11.22 TYPE 2 DIABETES MELLITUS WITH STAGE 3A CHRONIC KIDNEY DISEASE, WITHOUT LONG-TERM CURRENT USE OF INSULIN (HCC): Primary | ICD-10-CM

## 2025-02-26 LAB
ANION GAP SERPL CALC-SCNC: 15 MMOL/L (ref 7–16)
BUN SERPL-MCNC: 21 MG/DL (ref 8–23)
CALCIUM SERPL-MCNC: 9.8 MG/DL (ref 8.8–10.2)
CHLORIDE SERPL-SCNC: 103 MMOL/L (ref 98–107)
CO2 SERPL-SCNC: 21 MMOL/L (ref 20–29)
CREAT SERPL-MCNC: 1.52 MG/DL (ref 0.8–1.3)
EST. AVERAGE GLUCOSE BLD GHB EST-MCNC: 167 MG/DL
GLUCOSE SERPL-MCNC: 105 MG/DL (ref 70–99)
HBA1C MFR BLD: 7.4 % (ref 0–5.6)
POTASSIUM SERPL-SCNC: 4.2 MMOL/L (ref 3.5–5.1)
PSA SERPL-MCNC: 0.7 NG/ML (ref 0–4)
SODIUM SERPL-SCNC: 140 MMOL/L (ref 136–145)

## 2025-02-26 PROCEDURE — 1126F AMNT PAIN NOTED NONE PRSNT: CPT | Performed by: FAMILY MEDICINE

## 2025-02-26 PROCEDURE — 3051F HG A1C>EQUAL 7.0%<8.0%: CPT | Performed by: FAMILY MEDICINE

## 2025-02-26 PROCEDURE — 1160F RVW MEDS BY RX/DR IN RCRD: CPT | Performed by: FAMILY MEDICINE

## 2025-02-26 PROCEDURE — 1159F MED LIST DOCD IN RCRD: CPT | Performed by: FAMILY MEDICINE

## 2025-02-26 PROCEDURE — G8427 DOCREV CUR MEDS BY ELIG CLIN: HCPCS | Performed by: FAMILY MEDICINE

## 2025-02-26 PROCEDURE — G8417 CALC BMI ABV UP PARAM F/U: HCPCS | Performed by: FAMILY MEDICINE

## 2025-02-26 PROCEDURE — 4004F PT TOBACCO SCREEN RCVD TLK: CPT | Performed by: FAMILY MEDICINE

## 2025-02-26 PROCEDURE — 3079F DIAST BP 80-89 MM HG: CPT | Performed by: FAMILY MEDICINE

## 2025-02-26 PROCEDURE — 99214 OFFICE O/P EST MOD 30 MIN: CPT | Performed by: FAMILY MEDICINE

## 2025-02-26 PROCEDURE — 3074F SYST BP LT 130 MM HG: CPT | Performed by: FAMILY MEDICINE

## 2025-02-26 PROCEDURE — 1123F ACP DISCUSS/DSCN MKR DOCD: CPT | Performed by: FAMILY MEDICINE

## 2025-02-26 RX ORDER — TAMSULOSIN HYDROCHLORIDE 0.4 MG/1
0.4 CAPSULE ORAL DAILY
Qty: 90 CAPSULE | Refills: 1 | Status: SHIPPED | OUTPATIENT
Start: 2025-02-26

## 2025-02-26 SDOH — ECONOMIC STABILITY: FOOD INSECURITY: WITHIN THE PAST 12 MONTHS, THE FOOD YOU BOUGHT JUST DIDN'T LAST AND YOU DIDN'T HAVE MONEY TO GET MORE.: NEVER TRUE

## 2025-02-26 SDOH — ECONOMIC STABILITY: FOOD INSECURITY: WITHIN THE PAST 12 MONTHS, YOU WORRIED THAT YOUR FOOD WOULD RUN OUT BEFORE YOU GOT MONEY TO BUY MORE.: NEVER TRUE

## 2025-02-26 ASSESSMENT — PATIENT HEALTH QUESTIONNAIRE - PHQ9
SUM OF ALL RESPONSES TO PHQ QUESTIONS 1-9: 7
4. FEELING TIRED OR HAVING LITTLE ENERGY: NEARLY EVERY DAY
SUM OF ALL RESPONSES TO PHQ9 QUESTIONS 1 & 2: 0
SUM OF ALL RESPONSES TO PHQ QUESTIONS 1-9: 7
1. LITTLE INTEREST OR PLEASURE IN DOING THINGS: NOT AT ALL
2. FEELING DOWN, DEPRESSED OR HOPELESS: NOT AT ALL
3. TROUBLE FALLING OR STAYING ASLEEP: NEARLY EVERY DAY
10. IF YOU CHECKED OFF ANY PROBLEMS, HOW DIFFICULT HAVE THESE PROBLEMS MADE IT FOR YOU TO DO YOUR WORK, TAKE CARE OF THINGS AT HOME, OR GET ALONG WITH OTHER PEOPLE: NOT DIFFICULT AT ALL
9. THOUGHTS THAT YOU WOULD BE BETTER OFF DEAD, OR OF HURTING YOURSELF: NOT AT ALL
8. MOVING OR SPEAKING SO SLOWLY THAT OTHER PEOPLE COULD HAVE NOTICED. OR THE OPPOSITE, BEING SO FIGETY OR RESTLESS THAT YOU HAVE BEEN MOVING AROUND A LOT MORE THAN USUAL: NOT AT ALL
5. POOR APPETITE OR OVEREATING: NOT AT ALL
SUM OF ALL RESPONSES TO PHQ QUESTIONS 1-9: 7
SUM OF ALL RESPONSES TO PHQ QUESTIONS 1-9: 7
6. FEELING BAD ABOUT YOURSELF - OR THAT YOU ARE A FAILURE OR HAVE LET YOURSELF OR YOUR FAMILY DOWN: NOT AT ALL
7. TROUBLE CONCENTRATING ON THINGS, SUCH AS READING THE NEWSPAPER OR WATCHING TELEVISION: SEVERAL DAYS

## 2025-02-26 NOTE — PROGRESS NOTES
PROGRESS NOTE    SUBJECTIVE:   Fox Marie is a 74 y.o. male seen for a follow up visit regarding   Chief Complaint   Patient presents with    Follow-up Chronic Condition     States that he has been sleeping a lot recently. States that he feels fine, just sleepy.       GAPS: Lung CT, AWV        HPI:  Here today for follow-up of chronic problems.  He overall doing well he reports that he is sleepy a lot during the day.  He also reports nocturia, several times a night.         Reviewed and updated this visit by provider:  Tobacco  Allergies  Meds  Problems  Med Hx  Surg Hx  Fam Hx           Review of Systems   Respiratory: Negative.     Cardiovascular: Negative.           OBJECTIVE:  Vitals:    02/26/25 1415   BP: 124/80   Resp: 16   Weight: 86 kg (189 lb 9.6 oz)   Height: 1.753 m (5' 9.02\")        Physical Exam   General: Alert and oriented x3, well-appearing  Neck: No adenopathy, thyromegaly or thyroid nodules  Pulmonary: Normal effort, good airflow, no rales or rhonchi  CVS: Regular rate and rhythm, normal S1, S2, no S3 or S4, no murmurs; no carotid bruits, 2+ pedal pulses      Medical problems and test results were reviewed with the patient today.     Recent Results (from the past 672 hour(s))   Hemoglobin A1C    Collection Time: 02/26/25  2:59 PM   Result Value Ref Range    Hemoglobin A1C 7.4 (H) 0 - 5.6 %    Estimated Avg Glucose 167 mg/dL   Basic Metabolic Panel    Collection Time: 02/26/25  2:59 PM   Result Value Ref Range    Sodium 140 136 - 145 mmol/L    Potassium 4.2 3.5 - 5.1 mmol/L    Chloride 103 98 - 107 mmol/L    CO2 21 20 - 29 mmol/L    Anion Gap 15 7 - 16 mmol/L    Glucose 105 (H) 70 - 99 mg/dL    BUN 21 8 - 23 MG/DL    Creatinine 1.52 (H) 0.80 - 1.30 MG/DL    Est, Glom Filt Rate 47 (L) >60 ml/min/1.73m2    Calcium 9.8 8.8 - 10.2 MG/DL   PSA, Diagnostic    Collection Time: 02/26/25  2:59 PM   Result Value Ref Range    PSA 0.7 0.0 - 4.0 ng/mL       Results for orders placed or

## 2025-02-27 PROBLEM — I21.02 ST ELEVATION MYOCARDIAL INFARCTION INVOLVING LEFT ANTERIOR DESCENDING (LAD) CORONARY ARTERY (HCC): Chronic | Status: RESOLVED | Noted: 2022-11-13 | Resolved: 2025-02-27

## 2025-02-27 ASSESSMENT — ENCOUNTER SYMPTOMS: RESPIRATORY NEGATIVE: 1

## 2025-05-28 ENCOUNTER — OFFICE VISIT (OUTPATIENT)
Dept: FAMILY MEDICINE CLINIC | Facility: CLINIC | Age: 75
End: 2025-05-28
Payer: MEDICARE

## 2025-05-28 VITALS
RESPIRATION RATE: 16 BRPM | HEIGHT: 69 IN | WEIGHT: 182.8 LBS | SYSTOLIC BLOOD PRESSURE: 122 MMHG | BODY MASS INDEX: 27.08 KG/M2 | DIASTOLIC BLOOD PRESSURE: 78 MMHG

## 2025-05-28 DIAGNOSIS — N18.30 CKD STAGE 3 DUE TO TYPE 2 DIABETES MELLITUS (HCC): ICD-10-CM

## 2025-05-28 DIAGNOSIS — Z87.891 PERSONAL HISTORY OF TOBACCO USE: ICD-10-CM

## 2025-05-28 DIAGNOSIS — F34.1 DYSTHYMIA: ICD-10-CM

## 2025-05-28 DIAGNOSIS — I25.5 ISCHEMIC CARDIOMYOPATHY: ICD-10-CM

## 2025-05-28 DIAGNOSIS — E11.22 CKD STAGE 3 DUE TO TYPE 2 DIABETES MELLITUS (HCC): ICD-10-CM

## 2025-05-28 DIAGNOSIS — N18.31 TYPE 2 DIABETES MELLITUS WITH STAGE 3A CHRONIC KIDNEY DISEASE, WITHOUT LONG-TERM CURRENT USE OF INSULIN (HCC): ICD-10-CM

## 2025-05-28 DIAGNOSIS — Z00.00 MEDICARE ANNUAL WELLNESS VISIT, SUBSEQUENT: ICD-10-CM

## 2025-05-28 DIAGNOSIS — R35.0 BENIGN PROSTATIC HYPERPLASIA WITH URINARY FREQUENCY: ICD-10-CM

## 2025-05-28 DIAGNOSIS — Z12.5 SCREENING FOR PROSTATE CANCER: ICD-10-CM

## 2025-05-28 DIAGNOSIS — N40.1 BENIGN PROSTATIC HYPERPLASIA WITH URINARY FREQUENCY: ICD-10-CM

## 2025-05-28 DIAGNOSIS — E78.5 HYPERLIPIDEMIA, UNSPECIFIED HYPERLIPIDEMIA TYPE: ICD-10-CM

## 2025-05-28 DIAGNOSIS — E11.22 TYPE 2 DIABETES MELLITUS WITH STAGE 3A CHRONIC KIDNEY DISEASE, WITHOUT LONG-TERM CURRENT USE OF INSULIN (HCC): ICD-10-CM

## 2025-05-28 DIAGNOSIS — I10 PRIMARY HYPERTENSION: ICD-10-CM

## 2025-05-28 DIAGNOSIS — I25.10 CORONARY ARTERY DISEASE INVOLVING NATIVE CORONARY ARTERY OF NATIVE HEART WITHOUT ANGINA PECTORIS: Primary | ICD-10-CM

## 2025-05-28 PROCEDURE — 1159F MED LIST DOCD IN RCRD: CPT | Performed by: FAMILY MEDICINE

## 2025-05-28 PROCEDURE — 1123F ACP DISCUSS/DSCN MKR DOCD: CPT | Performed by: FAMILY MEDICINE

## 2025-05-28 PROCEDURE — 3078F DIAST BP <80 MM HG: CPT | Performed by: FAMILY MEDICINE

## 2025-05-28 PROCEDURE — 4004F PT TOBACCO SCREEN RCVD TLK: CPT | Performed by: FAMILY MEDICINE

## 2025-05-28 PROCEDURE — G8417 CALC BMI ABV UP PARAM F/U: HCPCS | Performed by: FAMILY MEDICINE

## 2025-05-28 PROCEDURE — G8427 DOCREV CUR MEDS BY ELIG CLIN: HCPCS | Performed by: FAMILY MEDICINE

## 2025-05-28 PROCEDURE — 3051F HG A1C>EQUAL 7.0%<8.0%: CPT | Performed by: FAMILY MEDICINE

## 2025-05-28 PROCEDURE — G0439 PPPS, SUBSEQ VISIT: HCPCS | Performed by: FAMILY MEDICINE

## 2025-05-28 PROCEDURE — 99214 OFFICE O/P EST MOD 30 MIN: CPT | Performed by: FAMILY MEDICINE

## 2025-05-28 PROCEDURE — 1160F RVW MEDS BY RX/DR IN RCRD: CPT | Performed by: FAMILY MEDICINE

## 2025-05-28 PROCEDURE — 3074F SYST BP LT 130 MM HG: CPT | Performed by: FAMILY MEDICINE

## 2025-05-28 PROCEDURE — G0296 VISIT TO DETERM LDCT ELIG: HCPCS | Performed by: FAMILY MEDICINE

## 2025-05-28 PROCEDURE — 1126F AMNT PAIN NOTED NONE PRSNT: CPT | Performed by: FAMILY MEDICINE

## 2025-05-28 RX ORDER — DULOXETIN HYDROCHLORIDE 30 MG/1
30 CAPSULE, DELAYED RELEASE ORAL DAILY
Qty: 90 CAPSULE | Refills: 3 | Status: SHIPPED | OUTPATIENT
Start: 2025-05-28 | End: 2026-05-28

## 2025-05-28 RX ORDER — TAMSULOSIN HYDROCHLORIDE 0.4 MG/1
0.4 CAPSULE ORAL DAILY
Qty: 90 CAPSULE | Refills: 1 | Status: SHIPPED | OUTPATIENT
Start: 2025-05-28

## 2025-05-28 RX ORDER — CLOPIDOGREL BISULFATE 75 MG/1
75 TABLET ORAL DAILY
Qty: 90 TABLET | Refills: 3 | Status: SHIPPED | OUTPATIENT
Start: 2025-05-28 | End: 2026-05-28

## 2025-05-28 RX ORDER — LOSARTAN POTASSIUM 50 MG/1
50 TABLET ORAL DAILY
Qty: 90 TABLET | Refills: 3 | Status: SHIPPED | OUTPATIENT
Start: 2025-05-28 | End: 2026-05-28

## 2025-05-28 RX ORDER — METOPROLOL SUCCINATE 50 MG/1
50 TABLET, EXTENDED RELEASE ORAL DAILY
Qty: 90 TABLET | Refills: 3 | Status: SHIPPED | OUTPATIENT
Start: 2025-05-28 | End: 2026-05-28

## 2025-05-28 RX ORDER — ATORVASTATIN CALCIUM 80 MG/1
80 TABLET, FILM COATED ORAL NIGHTLY
Qty: 90 TABLET | Refills: 3 | Status: SHIPPED | OUTPATIENT
Start: 2025-05-28 | End: 2026-05-28

## 2025-05-28 RX ORDER — FUROSEMIDE 40 MG/1
40 TABLET ORAL DAILY
Qty: 90 TABLET | Refills: 3 | Status: SHIPPED | OUTPATIENT
Start: 2025-05-28 | End: 2026-05-28

## 2025-05-28 RX ORDER — AMLODIPINE BESYLATE 5 MG/1
5 TABLET ORAL DAILY
Qty: 90 TABLET | Refills: 3 | Status: SHIPPED | OUTPATIENT
Start: 2025-05-28 | End: 2026-05-28

## 2025-05-28 SDOH — ECONOMIC STABILITY: FOOD INSECURITY: WITHIN THE PAST 12 MONTHS, THE FOOD YOU BOUGHT JUST DIDN'T LAST AND YOU DIDN'T HAVE MONEY TO GET MORE.: NEVER TRUE

## 2025-05-28 SDOH — ECONOMIC STABILITY: FOOD INSECURITY: WITHIN THE PAST 12 MONTHS, YOU WORRIED THAT YOUR FOOD WOULD RUN OUT BEFORE YOU GOT MONEY TO BUY MORE.: NEVER TRUE

## 2025-05-28 ASSESSMENT — PATIENT HEALTH QUESTIONNAIRE - PHQ9
SUM OF ALL RESPONSES TO PHQ QUESTIONS 1-9: 0
9. THOUGHTS THAT YOU WOULD BE BETTER OFF DEAD, OR OF HURTING YOURSELF: NOT AT ALL
4. FEELING TIRED OR HAVING LITTLE ENERGY: NOT AT ALL
6. FEELING BAD ABOUT YOURSELF - OR THAT YOU ARE A FAILURE OR HAVE LET YOURSELF OR YOUR FAMILY DOWN: NOT AT ALL
1. LITTLE INTEREST OR PLEASURE IN DOING THINGS: NOT AT ALL
8. MOVING OR SPEAKING SO SLOWLY THAT OTHER PEOPLE COULD HAVE NOTICED. OR THE OPPOSITE, BEING SO FIGETY OR RESTLESS THAT YOU HAVE BEEN MOVING AROUND A LOT MORE THAN USUAL: NOT AT ALL
7. TROUBLE CONCENTRATING ON THINGS, SUCH AS READING THE NEWSPAPER OR WATCHING TELEVISION: NOT AT ALL
SUM OF ALL RESPONSES TO PHQ QUESTIONS 1-9: 0
2. FEELING DOWN, DEPRESSED OR HOPELESS: NOT AT ALL
3. TROUBLE FALLING OR STAYING ASLEEP: NOT AT ALL
SUM OF ALL RESPONSES TO PHQ QUESTIONS 1-9: 0
5. POOR APPETITE OR OVEREATING: NOT AT ALL
10. IF YOU CHECKED OFF ANY PROBLEMS, HOW DIFFICULT HAVE THESE PROBLEMS MADE IT FOR YOU TO DO YOUR WORK, TAKE CARE OF THINGS AT HOME, OR GET ALONG WITH OTHER PEOPLE: NOT DIFFICULT AT ALL
SUM OF ALL RESPONSES TO PHQ QUESTIONS 1-9: 0

## 2025-05-28 ASSESSMENT — ENCOUNTER SYMPTOMS: RESPIRATORY NEGATIVE: 1

## 2025-05-28 NOTE — PROGRESS NOTES
PROGRESS NOTE    SUBJECTIVE:   Fox Marie is a 75 y.o. male seen for a follow up visit regarding   Chief Complaint   Patient presents with    Follow-up Chronic Condition           GAPS: Lung CT    Medicare AWV     Subsequent         HPI:  Here today for annual wellness visit and follow-up of chronic problems.  He reports that he is doing very well, he voices no complaints today.       Reviewed and updated this visit by provider:  Tobacco  Allergies  Meds  Problems  Med Hx  Surg Hx  Fam Hx           Review of Systems   Respiratory: Negative.     Cardiovascular: Negative.           OBJECTIVE:  Vitals:    05/28/25 1414   BP: 122/78   Resp: 16   Weight: 82.9 kg (182 lb 12.8 oz)   Height: 1.753 m (5' 9.02\")        Physical Exam   General: Alert and oriented x3, well-appearing  Neck: No adenopathy, thyromegaly or thyroid nodules  Pulmonary: Normal effort, good airflow, no rales or rhonchi  CVS: Regular rate and rhythm, normal S1, S2, no S3 or S4, no murmurs; no carotid bruits, 2+ pedal pulses      Medical problems and test results were reviewed with the patient today.     No results found for this or any previous visit (from the past 4 weeks).    No results found for any visits on 05/28/25.     ASSESSMENT and PLAN    1. Coronary artery disease involving native coronary artery of native heart without angina pectoris  -     metoprolol succinate (TOPROL XL) 50 MG extended release tablet; Take 1 tablet by mouth daily, Disp-90 tablet, R-3Normal  -     losartan (COZAAR) 50 MG tablet; Take 1 tablet by mouth daily, Disp-90 tablet, R-3Normal  -     clopidogrel (PLAVIX) 75 MG tablet; Take 1 tablet by mouth daily, Disp-90 tablet, R-3Normal  -     amLODIPine (NORVASC) 5 MG tablet; Take 1 tablet by mouth daily, Disp-90 tablet, R-3Normal  -     CBC with Auto Differential; Future  2. Primary hypertension  -     losartan (COZAAR) 50 MG tablet; Take 1 tablet by mouth daily, Disp-90 tablet, R-3Normal  -     amLODIPine

## (undated) DEVICE — GAUZE,SPONGE,4"X4",16PLY,STRL,LF,10/TRAY: Brand: MEDLINE

## (undated) DEVICE — SHEET, DRAPE, SPLIT, STERILE: Brand: MEDLINE

## (undated) DEVICE — PREVENA PLUS INCISION MANAGEMENT SYSTEM: Brand: PREVENA PLUS™

## (undated) DEVICE — PAD,ABDOMINAL,5"X9",ST,LF,25/BX: Brand: MEDLINE INDUSTRIES, INC.

## (undated) DEVICE — BASIC SINGLE BASIN-LF: Brand: MEDLINE INDUSTRIES, INC.

## (undated) DEVICE — ELECTRODE NDL 2.8IN COAT VALLEYLAB

## (undated) DEVICE — DRAPE TWL SURG 16X26IN BLU ORB04] ALLCARE INC]

## (undated) DEVICE — SUTURE ETHLN SZ 4-0 L18IN NONABSORBABLE BLK L19MM PS-2 3/8 1667H

## (undated) DEVICE — MAJOR GENERAL: Brand: MEDLINE INDUSTRIES, INC.

## (undated) DEVICE — Device

## (undated) DEVICE — NEEDLE HYPO 18GA L1.5IN PNK S STL HUB POLYPR SHLD REG BVL

## (undated) DEVICE — CATH BLLN ANGIO 3X20MM SC EUPHORA RX

## (undated) DEVICE — DRAPE,TOP,102X53,STERILE: Brand: MEDLINE

## (undated) DEVICE — SUTURE VCRL SZ 3-0 L18IN ABSRB UD PS-2 L19MM 1/2 CIR J497G

## (undated) DEVICE — SOLUTION IRRIG 1000ML 0.9% SOD CHL USP POUR PLAS BTL

## (undated) DEVICE — GLIDESHEATH SLENDER STAINLESS STEEL KIT: Brand: GLIDESHEATH SLENDER

## (undated) DEVICE — DISPOSABLE SCALPEL, POLYMER HANDLE, FIG. 11, CARBON STEEL, STERILE, DISPOSABLE, PACKAGE OF 10 PIECES: Brand: AESCULAP

## (undated) DEVICE — 3M™ TEGADERM™ TRANSPARENT FILM DRESSING FRAME STYLE, 1629, 8 IN X 12 IN (20 CM X 30 CM), 10/CT 8CT/CASE: Brand: 3M™ TEGADERM™

## (undated) DEVICE — BAND COMPR L24CM REG CLR PLAS HEMSTAT EXT HK AND LOOP RETEN

## (undated) DEVICE — STOCKINETTE ORTH W9XL36IN COT 2 PLY HLLW FOR HANDLING LMB

## (undated) DEVICE — BLADE CLIPPER GEN PURP NS

## (undated) DEVICE — SURGICAL PROCEDURE PACK BASIC ST FRANCIS

## (undated) DEVICE — SUTURE PERMAHAND SZ 2-0 L18IN NONABSORBABLE BLK L26MM PS 1588H

## (undated) DEVICE — 1.5 TO 1 DERMACARRIER: Brand: MESHGRAFTTM  II TISSUE EXPANSION SYSTEM

## (undated) DEVICE — 3M™ IOBAN™ 2 ANTIMICROBIAL INCISE DRAPE 6650EZ: Brand: IOBAN™ 2

## (undated) DEVICE — APPLICATOR MEDICATED 26 CC SOLUTION HI LT ORNG CHLORAPREP

## (undated) DEVICE — NEEDLE HYPO 25GA L1.5IN BLU POLYPR HUB S STL REG BVL STR

## (undated) DEVICE — CATHETER DIAG AD 5FR L110CM 145DEG COR GRY HYDRPHLC NYL

## (undated) DEVICE — CANISTER NEG PRSS 500ML WND THER W/ TBNG NO PRSS RANG W/

## (undated) DEVICE — NEPTUNE E-SEP SMOKE EVACUATION PENCIL, COATED, 70MM BLADE, PUSH BUTTON SWITCH: Brand: NEPTUNE E-SEP

## (undated) DEVICE — Device: Brand: PROWATER

## (undated) DEVICE — SYRINGE MED 10ML LUERLOCK TIP W/O SFTY DISP

## (undated) DEVICE — CATHETER GUID AD 6FR L100CM COR PERIPH GRN NYL PTFE XB L 3

## (undated) DEVICE — SYRINGE MED 30ML STD CLR PLAS LUERLOCK TIP N CTRL DISP

## (undated) DEVICE — CATHETER DIAG AD 6FR L100CM 0.038IN COR POLYUR JUDKINS R 5

## (undated) DEVICE — DECANTER FLD 9IN ST BG FOR ASEP TRNSF OF FLD

## (undated) DEVICE — PREMIUM WET SKIN PREP TRAY: Brand: MEDLINE INDUSTRIES, INC.

## (undated) DEVICE — SUTURE STRATAFIX SZ 3-0 L14CM NONABSORBABLE UD L19MM FS-2 SXMP2B406

## (undated) DEVICE — DRAPE,U/SHT,SPLIT,FILM,60X84,STERILE: Brand: MEDLINE

## (undated) DEVICE — SUTURE ABSRB X-1 REV CUT 1/2 CIR 22MM UD BRAID 27IN SZ 3-0 J458H

## (undated) DEVICE — MICRODISSECTION NEEDLE STRAIGHT SLEEVE: Brand: COLORADO

## (undated) DEVICE — DERMATOME BLADES: Brand: DERMATOME

## (undated) DEVICE — ELECTRODE PT RET AD L9FT HI MOIST COND ADH HYDRGEL CORDED

## (undated) DEVICE — LIQUIBAND RAPID ADHESIVE 36/CS 0.8ML: Brand: MEDLINE

## (undated) DEVICE — NEEDLE FLTR 18GA L1.5IN MEM THK5UM BLNT DISP

## (undated) DEVICE — DISPOSABLE SCALPEL, POLYMER HANDLE, FIG. 15, CARBON STEEL, STERILE, DISPOSABLE, PACKAGE OF 10 PIECES: Brand: AESCULAP

## (undated) DEVICE — SUTURE PDS II SZ 0 L36IN ABSRB VLT L48MM CTX 1/2 CIR Z370T

## (undated) DEVICE — COPILOT BLEEDBACK CONTROL VALVE: Brand: COPILOT

## (undated) DEVICE — INTENDED FOR TISSUE SEPARATION, AND OTHER PROCEDURES THAT REQUIRE A SHARP SURGICAL BLADE TO PUNCTURE OR CUT.: Brand: BARD-PARKER ® STAINLESS STEEL BLADES

## (undated) DEVICE — GUIDEWIRE 035IN 210CM PTFE COAT FIX COR J TIP 15MM FIRM BODY

## (undated) DEVICE — SUREFIT, DUAL DISPERSIVE ELECTRODE, CONTACT QUALITY MONITOR: Brand: SUREFIT

## (undated) DEVICE — GLOVE SURG SZ 7 CRM LTX FREE POLYISOPRENE POLYMER BEAD ANTI

## (undated) DEVICE — KIT DRSG SM W12.5XH3.2XL18CM VAC SH DRP PD W/ CONN DISP RUL

## (undated) DEVICE — DRESSING,GAUZE,XEROFORM,CURAD,5"X9",ST: Brand: CURAD

## (undated) DEVICE — NEEDLE SPNL L3.5IN PNK HUB S STL REG WALL FIT STYL W/ QNCKE